# Patient Record
Sex: FEMALE | Race: BLACK OR AFRICAN AMERICAN | Employment: FULL TIME | URBAN - METROPOLITAN AREA
[De-identification: names, ages, dates, MRNs, and addresses within clinical notes are randomized per-mention and may not be internally consistent; named-entity substitution may affect disease eponyms.]

---

## 2019-02-06 ENCOUNTER — HOSPITAL ENCOUNTER (OUTPATIENT)
Dept: SLEEP CENTER | Age: 59
Discharge: HOME | End: 2019-02-06
Payer: COMMERCIAL

## 2019-02-06 VITALS
SYSTOLIC BLOOD PRESSURE: 130 MMHG | BODY MASS INDEX: 34.56 KG/M2 | DIASTOLIC BLOOD PRESSURE: 80 MMHG | HEART RATE: 68 BPM | WEIGHT: 228 LBS | HEIGHT: 68 IN | OXYGEN SATURATION: 95 %

## 2019-02-06 DIAGNOSIS — G47.9 RESTLESS SLEEPER: ICD-10-CM

## 2019-02-06 DIAGNOSIS — G47.19 EXCESSIVE DAYTIME SLEEPINESS: Primary | ICD-10-CM

## 2019-02-06 DIAGNOSIS — R40.0 EXCESSIVE SLEEPINESS WHILE DRIVING: ICD-10-CM

## 2019-02-06 DIAGNOSIS — E66.9 CLASS 1 OBESITY WITH BODY MASS INDEX (BMI) OF 34.0 TO 34.9 IN ADULT, UNSPECIFIED OBESITY TYPE, UNSPECIFIED WHETHER SERIOUS COMORBIDITY PRESENT: ICD-10-CM

## 2019-02-06 DIAGNOSIS — R06.83 SNORES: ICD-10-CM

## 2019-02-06 PROCEDURE — 95810 POLYSOM 6/> YRS 4/> PARAM: CPT

## 2019-02-06 ASSESSMENT — SLEEP AND FATIGUE QUESTIONNAIRES
HOW LIKELY ARE YOU TO NOD OFF OR FALL ASLEEP WHILE SITTING INACTIVE IN A PUBLIC PLACE: 2
HOW LIKELY ARE YOU TO NOD OFF OR FALL ASLEEP WHEN YOU ARE A PASSENGER IN A CAR FOR AN HOUR WITHOUT A BREAK: 3
HOW LIKELY ARE YOU TO NOD OFF OR FALL ASLEEP WHILE SITTING AND TALKING TO SOMEONE: 2
HOW LIKELY ARE YOU TO NOD OFF OR FALL ASLEEP WHILE SITTING AND READING: 3
ESS TOTAL SCORE: 19
HOW LIKELY ARE YOU TO NOD OFF OR FALL ASLEEP WHILE WATCHING TV: 3
HOW LIKELY ARE YOU TO NOD OFF OR FALL ASLEEP WHILE LYING DOWN TO REST IN THE AFTERNOON WHEN CIRCUMSTANCES PERMIT: 3
HOW LIKELY ARE YOU TO NOD OFF OR FALL ASLEEP WHILE SITTING QUIETLY AFTER LUNCH WITHOUT ALCOHOL: 2
HOW LIKELY ARE YOU TO NOD OFF OR FALL ASLEEP IN A CAR, WHILE STOPPED FOR A FEW MINUTES IN TRAFFIC: 1

## 2019-12-07 ENCOUNTER — HOSPITAL ENCOUNTER (EMERGENCY)
Age: 59
Discharge: HOME | End: 2019-12-07
Attending: EMERGENCY MEDICINE

## 2019-12-07 ENCOUNTER — APPOINTMENT (OUTPATIENT)
Dept: GENERAL RADIOLOGY | Age: 59
End: 2019-12-07

## 2019-12-07 VITALS
BODY MASS INDEX: 33.95 KG/M2 | OXYGEN SATURATION: 98 % | HEART RATE: 80 BPM | RESPIRATION RATE: 18 BRPM | SYSTOLIC BLOOD PRESSURE: 141 MMHG | TEMPERATURE: 98.2 F | WEIGHT: 224 LBS | HEIGHT: 68 IN | DIASTOLIC BLOOD PRESSURE: 93 MMHG

## 2019-12-07 DIAGNOSIS — M25.552 LEFT HIP PAIN: Primary | ICD-10-CM

## 2019-12-07 PROCEDURE — 72070 X-RAY EXAM THORAC SPINE 2VWS: CPT

## 2019-12-07 PROCEDURE — 72100 X-RAY EXAM L-S SPINE 2/3 VWS: CPT

## 2019-12-07 PROCEDURE — 6360000002 HC RX W HCPCS: Performed by: STUDENT IN AN ORGANIZED HEALTH CARE EDUCATION/TRAINING PROGRAM

## 2019-12-07 PROCEDURE — 96372 THER/PROPH/DIAG INJ SC/IM: CPT

## 2019-12-07 PROCEDURE — 99283 EMERGENCY DEPT VISIT LOW MDM: CPT

## 2019-12-07 PROCEDURE — 73502 X-RAY EXAM HIP UNI 2-3 VIEWS: CPT

## 2019-12-07 RX ORDER — NAPROXEN 250 MG/1
250 TABLET ORAL 2 TIMES DAILY WITH MEALS
Qty: 20 TABLET | Refills: 0 | Status: SHIPPED | OUTPATIENT
Start: 2019-12-07 | End: 2021-08-18

## 2019-12-07 RX ORDER — KETOROLAC TROMETHAMINE 30 MG/ML
30 INJECTION, SOLUTION INTRAMUSCULAR; INTRAVENOUS ONCE
Status: COMPLETED | OUTPATIENT
Start: 2019-12-07 | End: 2019-12-07

## 2019-12-07 RX ORDER — ORPHENADRINE CITRATE 100 MG/1
100 TABLET, EXTENDED RELEASE ORAL 2 TIMES DAILY
Qty: 20 TABLET | Refills: 0 | Status: SHIPPED | OUTPATIENT
Start: 2019-12-07 | End: 2019-12-17

## 2019-12-07 RX ORDER — ORPHENADRINE CITRATE 30 MG/ML
60 INJECTION INTRAMUSCULAR; INTRAVENOUS ONCE
Status: COMPLETED | OUTPATIENT
Start: 2019-12-07 | End: 2019-12-07

## 2019-12-07 RX ADMIN — ORPHENADRINE CITRATE 60 MG: 30 INJECTION INTRAMUSCULAR; INTRAVENOUS at 19:33

## 2019-12-07 RX ADMIN — KETOROLAC TROMETHAMINE 30 MG: 30 INJECTION, SOLUTION INTRAMUSCULAR; INTRAVENOUS at 19:33

## 2019-12-07 ASSESSMENT — ENCOUNTER SYMPTOMS
SORE THROAT: 0
VOMITING: 0
BACK PAIN: 0
SHORTNESS OF BREATH: 0
RHINORRHEA: 0
COUGH: 0
ABDOMINAL PAIN: 0
NAUSEA: 0
DIARRHEA: 0

## 2019-12-07 ASSESSMENT — PAIN SCALES - GENERAL: PAINLEVEL_OUTOF10: 9

## 2019-12-07 ASSESSMENT — PAIN DESCRIPTION - PROGRESSION: CLINICAL_PROGRESSION: GRADUALLY WORSENING

## 2019-12-07 ASSESSMENT — PAIN DESCRIPTION - DESCRIPTORS: DESCRIPTORS: NUMBNESS

## 2019-12-07 ASSESSMENT — PAIN DESCRIPTION - PAIN TYPE: TYPE: ACUTE PAIN

## 2019-12-07 ASSESSMENT — PAIN DESCRIPTION - ORIENTATION: ORIENTATION: LEFT

## 2019-12-07 ASSESSMENT — PAIN DESCRIPTION - LOCATION: LOCATION: HIP

## 2019-12-07 ASSESSMENT — PAIN DESCRIPTION - FREQUENCY: FREQUENCY: CONTINUOUS

## 2019-12-07 ASSESSMENT — PAIN DESCRIPTION - ONSET: ONSET: ON-GOING

## 2020-02-25 ENCOUNTER — HOSPITAL ENCOUNTER (OUTPATIENT)
Dept: GENERAL RADIOLOGY | Age: 60
Discharge: HOME | End: 2020-02-27
Payer: COMMERCIAL

## 2020-02-25 ENCOUNTER — HOSPITAL ENCOUNTER (OUTPATIENT)
Age: 60
Discharge: HOME | End: 2020-02-25
Payer: COMMERCIAL

## 2020-02-25 LAB
ALBUMIN SERPL-MCNC: 3.8 G/DL (ref 3.5–5.2)
ALP BLD-CCNC: 102 U/L (ref 35–104)
ALT SERPL-CCNC: 10 U/L (ref 0–32)
ANION GAP SERPL CALCULATED.3IONS-SCNC: 14 MMOL/L (ref 7–16)
AST SERPL-CCNC: 13 U/L (ref 0–31)
BASOPHILS ABSOLUTE: 0.02 E9/L (ref 0–0.2)
BASOPHILS RELATIVE PERCENT: 0.4 % (ref 0–2)
BILIRUB SERPL-MCNC: 0.4 MG/DL (ref 0–1.2)
BUN BLDV-MCNC: 12 MG/DL (ref 6–20)
CALCIUM SERPL-MCNC: 9.4 MG/DL (ref 8.6–10.2)
CHLORIDE BLD-SCNC: 105 MMOL/L (ref 98–107)
CHOLESTEROL, TOTAL: 119 MG/DL (ref 0–199)
CO2: 23 MMOL/L (ref 22–29)
CREAT SERPL-MCNC: 0.7 MG/DL (ref 0.5–1)
EOSINOPHILS ABSOLUTE: 0.35 E9/L (ref 0.05–0.5)
EOSINOPHILS RELATIVE PERCENT: 7 % (ref 0–6)
GFR AFRICAN AMERICAN: >60
GFR NON-AFRICAN AMERICAN: >60 ML/MIN/1.73
GLUCOSE BLD-MCNC: 92 MG/DL (ref 74–99)
HCT VFR BLD CALC: 36.2 % (ref 34–48)
HDLC SERPL-MCNC: 71 MG/DL
HEMOGLOBIN: 11.1 G/DL (ref 11.5–15.5)
IMMATURE GRANULOCYTES #: 0.01 E9/L
IMMATURE GRANULOCYTES %: 0.2 % (ref 0–5)
LDL CHOLESTEROL CALCULATED: 38 MG/DL (ref 0–99)
LYMPHOCYTES ABSOLUTE: 1.12 E9/L (ref 1.5–4)
LYMPHOCYTES RELATIVE PERCENT: 22.4 % (ref 20–42)
MCH RBC QN AUTO: 27.7 PG (ref 26–35)
MCHC RBC AUTO-ENTMCNC: 30.7 % (ref 32–34.5)
MCV RBC AUTO: 90.3 FL (ref 80–99.9)
MONOCYTES ABSOLUTE: 0.37 E9/L (ref 0.1–0.95)
MONOCYTES RELATIVE PERCENT: 7.4 % (ref 2–12)
NEUTROPHILS ABSOLUTE: 3.12 E9/L (ref 1.8–7.3)
NEUTROPHILS RELATIVE PERCENT: 62.6 % (ref 43–80)
PDW BLD-RTO: 14 FL (ref 11.5–15)
PLATELET # BLD: 191 E9/L (ref 130–450)
PMV BLD AUTO: 10.8 FL (ref 7–12)
POTASSIUM SERPL-SCNC: 3.8 MMOL/L (ref 3.5–5)
RBC # BLD: 4.01 E12/L (ref 3.5–5.5)
SODIUM BLD-SCNC: 142 MMOL/L (ref 132–146)
TOTAL PROTEIN: 7.9 G/DL (ref 6.4–8.3)
TRIGL SERPL-MCNC: 50 MG/DL (ref 0–149)
TSH SERPL DL<=0.05 MIU/L-ACNC: 1.09 UIU/ML (ref 0.27–4.2)
VITAMIN D 25-HYDROXY: 10 NG/ML (ref 30–100)
VLDLC SERPL CALC-MCNC: 10 MG/DL
WBC # BLD: 5 E9/L (ref 4.5–11.5)

## 2020-02-25 PROCEDURE — 84443 ASSAY THYROID STIM HORMONE: CPT

## 2020-02-25 PROCEDURE — 80061 LIPID PANEL: CPT

## 2020-02-25 PROCEDURE — 36415 COLL VENOUS BLD VENIPUNCTURE: CPT

## 2020-02-25 PROCEDURE — 77063 BREAST TOMOSYNTHESIS BI: CPT

## 2020-02-25 PROCEDURE — 85025 COMPLETE CBC W/AUTO DIFF WBC: CPT

## 2020-02-25 PROCEDURE — 80053 COMPREHEN METABOLIC PANEL: CPT

## 2020-02-25 PROCEDURE — 82306 VITAMIN D 25 HYDROXY: CPT

## 2020-09-15 ENCOUNTER — HOSPITAL ENCOUNTER (OUTPATIENT)
Age: 60
Discharge: HOME | End: 2020-09-15
Payer: COMMERCIAL

## 2020-09-15 LAB
EKG ATRIAL RATE: 61 BPM
EKG P AXIS: 3 DEGREES
EKG P-R INTERVAL: 214 MS
EKG Q-T INTERVAL: 456 MS
EKG QRS DURATION: 88 MS
EKG QTC CALCULATION (BAZETT): 459 MS
EKG R AXIS: -16 DEGREES
EKG T AXIS: 44 DEGREES
EKG VENTRICULAR RATE: 61 BPM

## 2020-09-15 PROCEDURE — 93010 ELECTROCARDIOGRAM REPORT: CPT | Performed by: INTERNAL MEDICINE

## 2020-09-15 PROCEDURE — 93005 ELECTROCARDIOGRAM TRACING: CPT | Performed by: FAMILY MEDICINE

## 2021-01-25 ENCOUNTER — HOSPITAL ENCOUNTER (EMERGENCY)
Age: 61
Discharge: HOME | End: 2021-01-25
Payer: COMMERCIAL

## 2021-01-25 VITALS
BODY MASS INDEX: 33.19 KG/M2 | OXYGEN SATURATION: 98 % | HEART RATE: 72 BPM | WEIGHT: 219 LBS | DIASTOLIC BLOOD PRESSURE: 68 MMHG | SYSTOLIC BLOOD PRESSURE: 114 MMHG | HEIGHT: 68 IN | TEMPERATURE: 98.1 F | RESPIRATION RATE: 16 BRPM

## 2021-01-25 DIAGNOSIS — U07.1 COVID-19: Primary | ICD-10-CM

## 2021-01-25 LAB — SARS-COV-2, NAAT: DETECTED

## 2021-01-25 PROCEDURE — U0002 COVID-19 LAB TEST NON-CDC: HCPCS

## 2021-01-25 PROCEDURE — 99283 EMERGENCY DEPT VISIT LOW MDM: CPT

## 2021-01-25 PROCEDURE — U0003 INFECTIOUS AGENT DETECTION BY NUCLEIC ACID (DNA OR RNA); SEVERE ACUTE RESPIRATORY SYNDROME CORONAVIRUS 2 (SARS-COV-2) (CORONAVIRUS DISEASE [COVID-19]), AMPLIFIED PROBE TECHNIQUE, MAKING USE OF HIGH THROUGHPUT TECHNOLOGIES AS DESCRIBED BY CMS-2020-01-R: HCPCS

## 2021-01-25 RX ORDER — FLUTICASONE PROPIONATE 50 MCG
1 SPRAY, SUSPENSION (ML) NASAL DAILY
Qty: 1 BOTTLE | Refills: 0 | Status: SHIPPED | OUTPATIENT
Start: 2021-01-25 | End: 2022-05-09

## 2021-01-25 RX ORDER — GUAIFENESIN/DEXTROMETHORPHAN 100-10MG/5
10 SYRUP ORAL 3 TIMES DAILY PRN
Qty: 120 ML | Refills: 0 | Status: SHIPPED | OUTPATIENT
Start: 2021-01-25 | End: 2021-02-04

## 2021-01-25 NOTE — ED NOTES
Lab called at [phone] to f/u on patient's rapid COVID swab d/t length of pending results. Virology states they will have supervisor check into it as it appears swab was placed in area for send out. Awaiting response/result.      Jihan Rashid LPN  33/26/67 [id number]

## 2021-01-25 NOTE — ED PROVIDER NOTES
[address]  Department of Emergency Medicine   ED  Encounter Note  Admit Date/RoomTime: 2021 10:24 AM  ED Room: [facility]    NAME: Annabel Jorgensen  : 1960  MRN: [id number]     Chief Complaint:  Concern For COVID-19 (son was visiting and states she wants checked for covid. aaox4. )    History of Present Illness       Annabel Jorgensen is a 62 y.o. old female who presents to the emergency department by private vehicle, for nasal congestion, slight cough which began several day(s) prior to arrival.  Since onset the symptoms have been stable and mild-moderate in severity. The symptoms are associated with son reportedly has covid-19. There has been NO fevers, chills, chest pain, shortness of breath, hemoptysis, leg edema. ROS   Pertinent positives and negatives are stated within HPI, all other systems reviewed and are negative. Past Medical History:  has a past medical history of Arthritis and Hypertension. Surgical History:  has a past surgical history that includes Dilation and curettage of uterus; Tonsillectomy; Breast enhancement surgery; and hernia repair. Social History:  reports that she has never smoked. She has never used smokeless tobacco. She reports current alcohol use. She reports that she does not use drugs. Family History: family history is not on file. Allergies: Penicillins and Penicillins    Physical Exam   Oxygen Saturation Interpretation: Normal.        ED Triage Vitals [21 0954]   BP Temp Temp Source Pulse Resp SpO2 Height Weight   (!) 153/70 98.2 °F (36.8 °C) Temporal 56 18 96 % 5' 8\" (1.727 m) 219 lb (99.3 kg)         · Constitutional:  Alert, development consistent with age. · Ears:  External Ears: Bilateral normal.               TM's & External Canals: normal appearance. · Nose:   There is no discharge, swelling or lesions noted. · Sinuses: no Bilateral maxillary sinus tenderness.                     no Bilateral frontal sinus tenderness. · Mouth:  normal tongue and buccal mucosa. · Throat: no erythema or exudates noted. Teeth and gums normal..  Airway Patent. · Neck:  Supple. There is no  preauricular, anterior cervical and posterior cervical node tenderness. · Respiratory:   Breath sounds: Bilateral normal.  Lung sounds: normal.   · CV:  Regular rate and rhythm, normal heart sounds, without pathological murmurs, ectopy, gallops, or rubs. · GI:  Abdomen Soft, nontender, good bowel sounds. No firm or pulsatile mass. · Integument:  Normal turgor. Warm, dry, without visible rash. · Neurological:  Oriented. Motor functions intact. Lab / Imaging Results   (All laboratory and radiology results have been personally reviewed by myself)  Labs:  Results for orders placed or performed during the hospital encounter of 01/25/21   Covid-19 Ambulatory   Result Value Ref Range    Source NP swab    COVID-19   Result Value Ref Range    SARS-CoV-2, NAAT DETECTED (A) Not Detected       Imaging: All Radiology results interpreted by Radiologist unless otherwise noted. No orders to display       ED Course / Medical Decision Making   Medications - No data to display     Medical Decision Making:    Based on low suspicion for pneumonia as per history/physical findings, imaging was not done.  Based on moderate suspicion for COVID-19, testing was obtained and confirms the above findings.  Upper respiratory infection is likely to  be viral in etiology. Antibiotics are not indicated at this time based on clinical presentation and physical findings. She is not hypoxic. Patient is well appearing, non toxic and appropriate for outpatient management. Plan of Care/Counseling:  Myself reviewed today's visit with the patient in addition to providing specific details for the plan of care and counseling regarding the diagnosis and prognosis. Questions are answered at this time and are agreeable with the plan. Assessment     1. COVID-19      Plan   Discharge home. Patient condition is good    New Medications     Discharge Medication List as of 1/25/2021  2:35 PM      START taking these medications    Details   guaiFENesin-dextromethorphan (ROBITUSSIN DM) 100-10 MG/5ML syrup Take 10 mLs by mouth 3 times daily as needed for Cough, Disp-120 mL, R-0Print      fluticasone (FLONASE) 50 MCG/ACT nasal spray 1 spray by Each Nostril route daily, Disp-1 Bottle, R-0Print           Electronically signed by MEHDI Jansen CNP   DD: 1/25/21  **This report was transcribed using voice recognition software. Every effort was made to ensure accuracy; however, inadvertent computerized transcription errors may be present.   END OF ED PROVIDER NOTE        MEHDI Salgado CNP  01/25/21 6156

## 2021-01-25 NOTE — ED NOTES
Lab returned my call at [phone] stating that the patient's COVID test was placed in the send out area because the tube had saline in it. Lab was informed that the original swab that was given was a dry swab. Lab states they didn't know what to tell me but the tube they had contains saline. Patient informed of the mix up and was given the option to wait on the results as a send out test or to be re-swabbed for a rapid test again. Patient chose to be re-swabbed at this time.      Bren Barbour LPN  67/82/62 1640

## 2021-01-26 ENCOUNTER — CARE COORDINATION (OUTPATIENT)
Dept: CARE COORDINATION | Age: 61
End: 2021-01-26

## 2021-01-26 LAB
SARS-COV-2: DETECTED
SOURCE: ABNORMAL

## 2021-01-26 NOTE — CARE COORDINATION
Date/Time:  1/26/2021 1:41 PM  Attempted to reach patient by telephone. Call within 2 business days of discharge: Yes Left HIPPA compliant message requesting a return call. Will attempt to reach patient again.

## 2021-01-27 ENCOUNTER — CARE COORDINATION (OUTPATIENT)
Dept: CARE COORDINATION | Age: 61
End: 2021-01-27

## 2021-03-15 ENCOUNTER — HOSPITAL ENCOUNTER (OUTPATIENT)
Dept: GENERAL RADIOLOGY | Age: 61
Discharge: HOME | End: 2021-03-17
Payer: COMMERCIAL

## 2021-03-15 DIAGNOSIS — Z12.31 ENCOUNTER FOR SCREENING MAMMOGRAM FOR MALIGNANT NEOPLASM OF BREAST: ICD-10-CM

## 2021-03-15 PROCEDURE — 77063 BREAST TOMOSYNTHESIS BI: CPT

## 2021-08-18 ENCOUNTER — HOSPITAL ENCOUNTER (EMERGENCY)
Age: 61
Discharge: HOME | End: 2021-08-18
Payer: COMMERCIAL

## 2021-08-18 VITALS
SYSTOLIC BLOOD PRESSURE: 183 MMHG | HEART RATE: 66 BPM | HEIGHT: 68 IN | BODY MASS INDEX: 33.19 KG/M2 | DIASTOLIC BLOOD PRESSURE: 120 MMHG | RESPIRATION RATE: 16 BRPM | OXYGEN SATURATION: 97 % | WEIGHT: 219 LBS | TEMPERATURE: 96.9 F

## 2021-08-18 DIAGNOSIS — K04.7 DENTAL ABSCESS: Primary | ICD-10-CM

## 2021-08-18 PROCEDURE — 99283 EMERGENCY DEPT VISIT LOW MDM: CPT

## 2021-08-18 PROCEDURE — 6370000000 HC RX 637 (ALT 250 FOR IP): Performed by: NURSE PRACTITIONER

## 2021-08-18 RX ORDER — NAPROXEN 500 MG/1
500 TABLET ORAL ONCE
Status: COMPLETED | OUTPATIENT
Start: 2021-08-18 | End: 2021-08-18

## 2021-08-18 RX ORDER — ACETAMINOPHEN 500 MG
1000 TABLET ORAL ONCE
Status: COMPLETED | OUTPATIENT
Start: 2021-08-18 | End: 2021-08-18

## 2021-08-18 RX ORDER — CHLORHEXIDINE GLUCONATE 0.12 MG/ML
15 RINSE ORAL 2 TIMES DAILY
Qty: 420 ML | Refills: 0 | Status: SHIPPED | OUTPATIENT
Start: 2021-08-18 | End: 2021-09-01

## 2021-08-18 RX ORDER — ACETAMINOPHEN 500 MG
1000 TABLET ORAL EVERY 6 HOURS PRN
Qty: 120 TABLET | Refills: 0 | Status: SHIPPED | OUTPATIENT
Start: 2021-08-18 | End: 2022-05-09

## 2021-08-18 RX ORDER — CLINDAMYCIN HYDROCHLORIDE 300 MG/1
300 CAPSULE ORAL 3 TIMES DAILY
Qty: 21 CAPSULE | Refills: 0 | Status: SHIPPED | OUTPATIENT
Start: 2021-08-18 | End: 2021-08-25

## 2021-08-18 RX ORDER — NAPROXEN 500 MG/1
500 TABLET ORAL 2 TIMES DAILY WITH MEALS
Qty: 20 TABLET | Refills: 0 | Status: SHIPPED | OUTPATIENT
Start: 2021-08-18 | End: 2022-05-09

## 2021-08-18 RX ORDER — CLINDAMYCIN HYDROCHLORIDE 150 MG/1
300 CAPSULE ORAL ONCE
Status: COMPLETED | OUTPATIENT
Start: 2021-08-18 | End: 2021-08-18

## 2021-08-18 RX ADMIN — ACETAMINOPHEN 1000 MG: 500 TABLET, FILM COATED ORAL at 14:59

## 2021-08-18 RX ADMIN — NAPROXEN 500 MG: 500 TABLET ORAL at 14:59

## 2021-08-18 RX ADMIN — CLINDAMYCIN HYDROCHLORIDE 300 MG: 150 CAPSULE ORAL at 14:59

## 2021-08-18 ASSESSMENT — PAIN SCALES - GENERAL
PAINLEVEL_OUTOF10: 5
PAINLEVEL_OUTOF10: 4

## 2021-08-18 ASSESSMENT — PAIN DESCRIPTION - PAIN TYPE: TYPE: ACUTE PAIN

## 2021-08-18 ASSESSMENT — PAIN DESCRIPTION - ORIENTATION: ORIENTATION: LEFT

## 2021-08-18 ASSESSMENT — PAIN DESCRIPTION - LOCATION: LOCATION: MOUTH

## 2021-08-18 NOTE — ED PROVIDER NOTES
[facility]  Department of Emergency Medicine   ED  Encounter Note  Admit Date/RoomTime: 2021  2:43 PM  ED Room: [place] [address]/    NAME: Margot Marie  : 1960  MRN: [id number]     Chief Complaint:  Facial Swelling (left side of face. Pt has bad tooth on top.)    History of Present Illness        Margot Marie is a 62 y.o. old female who presents to the emergency department by private vehicle, for non-traumatic left upper tooth pain, which occured a few day(s) prior to arrival.  Since onset the symptoms have been stable and mild-moderate in severity. Worsened by  hot liquids, cold liquids and chewing and improved by nothing. Associated Signs & Symptoms:  facial swelling left. She denies any fevers or chills or difficulty breathing or swallowing. ROS   Pertinent positives and negatives are stated within HPI, all other systems reviewed and are negative. Past Medical History:  has a past medical history of Arthritis and Hypertension. Surgical History:  has a past surgical history that includes Dilation and curettage of uterus; Tonsillectomy; Breast enhancement surgery; and hernia repair. Social History:  reports that she has never smoked. She has never used smokeless tobacco. She reports current alcohol use. She reports that she does not use drugs. Family History: family history is not on file. Allergies: Penicillins and Penicillins    Physical Exam   Oxygen Saturation Interpretation: Normal.        ED Triage Vitals   BP Temp Temp src Pulse Resp SpO2 Height Weight   21 1440 21 1432 -- 21 1440 21 1440 21 1440 21 1440 21 1440   (!) 183/120 96.9 °F (36.1 °C)  66 16 97 % 5' 8\" (1.727 m) 219 lb (99.3 kg)         · Constitutional:  Alert, development consistent with age. · HEENT:  NC/NT. Airway patent. · Neck:  Supple. Normal ROM.   · Lips:  upper and lower normal.  · Mouth:  normal tongue and buccal mucosa. · Dental: Tenderness to palpation to left upper posterior molar with surrounding induration and fluctuance tooth broken with caries. Trismus: No.         Drooling: No.           Airway stridor: No.  · Facial skin: left tenderness and swelling. · Respiratory:  Clear to auscultation and breath sounds equal.    · CV: Regular rate and rhythm, normal heart sounds, without pathological murmurs, ectopy, gallops, or rubs. · Skin:  No rashes, erythema or lesions present, unless noted elsewhere. .  · Lymphatics: No lymphangitis or adenopathy noted. · Neurological:  Oriented. Motor functions intact. Lab / Imaging Results   (All laboratory and radiology results have been personally reviewed by myself)  Labs:  No results found for this visit on 08/18/21. Imaging: All Radiology results interpreted by Radiologist unless otherwise noted. No orders to display     ED Course / Medical Decision Making     Medications   clindamycin (CLEOCIN) capsule 300 mg (300 mg Oral Given 8/18/21 1459)   naproxen (NAPROSYN) tablet 500 mg (500 mg Oral Given 8/18/21 1459)   acetaminophen (TYLENOL) tablet 1,000 mg (1,000 mg Oral Given 8/18/21 1459)        Consult(s):   Dental Resident was not consulted to see patient regarding complaint. Procedure(s):   None    MDM: Dental pain with abscess noted on exam no trismus drooling fever chills. Plan will be start antibiotics and patient to go to dental clinic tomorrow morning as a walk-in for evaluation and likely I&D. She was educated on signs and symptoms require emergent reevaluation. Plan of Care/Counseling:  MEHDI Carrero CNP reviewed today's visit with the patient in addition to providing specific details for the plan of care and counseling regarding the diagnosis and prognosis. Questions are answered at this time and are agreeable with the plan. Assessment      1. Dental abscess      Plan   Discharged home.   Patient condition is good    New Medications     Discharge Medication List as of 8/18/2021  2:55 PM      START taking these medications    Details   clindamycin (CLEOCIN) 300 MG capsule Take 1 capsule by mouth 3 times daily for 7 days, Disp-21 capsule, R-0Print      acetaminophen (TYLENOL) 500 MG tablet Take 2 tablets by mouth every 6 hours as needed for Pain Maximum dose- 8 tablets/24 hours. , Disp-120 tablet, R-0Print      chlorhexidine (PERIDEX) 0.12 % solution Take 15 mLs by mouth 2 times daily for 14 days Swish and spit, Disp-420 mL, R-0Print           Electronically signed by MEHDI Parmar CNP   DD: 8/18/21  **This report was transcribed using voice recognition software. Every effort was made to ensure accuracy; however, inadvertent computerized transcription errors may be present.   END OF ED PROVIDER NOTE      MEHDI Dobbs CNP  08/18/21 7644

## 2022-01-11 ENCOUNTER — HOSPITAL ENCOUNTER (EMERGENCY)
Age: 62
Discharge: HOME | End: 2022-01-11
Payer: COMMERCIAL

## 2022-01-11 VITALS
RESPIRATION RATE: 18 BRPM | TEMPERATURE: 98.1 F | HEART RATE: 71 BPM | DIASTOLIC BLOOD PRESSURE: 92 MMHG | OXYGEN SATURATION: 94 % | SYSTOLIC BLOOD PRESSURE: 148 MMHG

## 2022-01-11 DIAGNOSIS — K08.89 PAIN, DENTAL: Primary | ICD-10-CM

## 2022-01-11 DIAGNOSIS — K02.9 DENTAL CARIES: ICD-10-CM

## 2022-01-11 PROCEDURE — 99282 EMERGENCY DEPT VISIT SF MDM: CPT

## 2022-01-11 RX ORDER — IBUPROFEN 800 MG/1
800 TABLET ORAL EVERY 8 HOURS PRN
Qty: 21 TABLET | Refills: 0 | Status: SHIPPED | OUTPATIENT
Start: 2022-01-11 | End: 2022-05-09

## 2022-01-11 RX ORDER — CHLORHEXIDINE GLUCONATE 0.12 MG/ML
15 RINSE ORAL 2 TIMES DAILY
Qty: 420 ML | Refills: 0 | Status: SHIPPED | OUTPATIENT
Start: 2022-01-11 | End: 2022-01-25

## 2022-01-11 RX ORDER — CLINDAMYCIN HYDROCHLORIDE 300 MG/1
300 CAPSULE ORAL 3 TIMES DAILY
Qty: 30 CAPSULE | Refills: 0 | Status: SHIPPED | OUTPATIENT
Start: 2022-01-11 | End: 2022-01-21

## 2022-01-11 NOTE — Clinical Note
Ceci Loyola was seen and treated in our emergency department on 1/11/2022. She may return to work on 01/12/2022. If you have any questions or concerns, please don't hesitate to call.       ARIANNE Luis

## 2022-01-11 NOTE — ED PROVIDER NOTES
[facility]dependent [facility]    HPI: Mirela Monae 62 y.o. female with a past medical history of   Past Medical History:   Diagnosis Date    Arthritis     Hypertension     presents with a complaint of dental pain for the past several days patient no known injury. Patient reports that she has a previously scheduled appointment with her dentist in February to have several extractions on her topper of teeth but she states that she presented there today and they were unable to see her. Patient's dentist recommended that she be evaluated in the ED setting for any type of infection and get on antibiotic to prevent abscess. Patient states that she has had abscess in the past which she received antibiotic and Peridex rinse. Patient states that she currently was using Peridex rinse at home which she had from a prior visit for the past several days. Patient states she has utilized over-the-counter analgesic medication to alleviate her pain. Patient denies any red flag symptoms of fever, chills difficulty swallowing or opening her mouth, nausea, vomiting, diarrhea, chest pain or shortness of breath. Patient is currently not on any blood thinners or antibiotics. The patient states this pain has been gradual in onset, persistent, moderate in severity and worse today which is what prompted the visit. Pain has not been relieved with any OTC medications. Patient denies any unilateral facial swelling. Patient is able to handle their own secretions and drink fluids without difficulty. Patient denies any fever. The patient also denies any history of dental trauma. Denies difficulty breathing or swallowing.  The location of the pain and appears to be isolated over tooth 13.        Review of Systems:   Pertinent positives and negatives are stated within HPI, all other systems reviewed and are negative.         --------------------------------------------- PAST HISTORY ---------------------------------------------  Past Medical History: has a past medical history of Arthritis and Hypertension. Past Surgical History:  has a past surgical history that includes Dilation and curettage of uterus; Tonsillectomy; Breast enhancement surgery; and hernia repair. Social History:  reports that she has never smoked. She has never used smokeless tobacco. She reports current alcohol use. She reports that she does not use drugs. Family History: family history is not on file. The patients home medications have been reviewed. Allergies: Penicillins and Penicillins    ------------------------- NURSING NOTES AND VITALS REVIEWED ---------------------------   The nursing notes within the ED encounter and vital signs as below have been reviewed by myself. BP (!) 148/92   Pulse 71   Temp 98.1 °F (36.7 °C) (Oral)   Resp 18   SpO2 94%   Oxygen Saturation Interpretation: Normal    The patients available past medical records and past encounters were reviewed. Physical exam:  Constitutional: The patient is comfortable, alert and oriented x3, well appearing, non toxic in NAD. Head: Atraumatic and normocephalic. Eyes: No discharge from the eyes the sclerae are normal.  ENT: The oropharynx is normal. No pharyngeal erythema, uvular edema, tonsillar exudates, asymmetry or trismus. Mouth is normal to inspection  With the exception of a pain on percussion of the tooth noted above. There is no evidence of facial asymmetry or abscess formation. Floor of the mouth is soft. No tenderness in the submental or submandibular space. No tongue elevation. Neck: The neck demonstrates normal range of motion. No meningeals signs are present. No stridor. Respiratory/chest: The chest is nontender. Breath sounds are normal. no respiratory distress is noted  Cardiovascular: Heart shows a regular rate and rhythm no murmurs no rubs no gallops.   Skin: The skin exam shows no evidence of rashes  Neuro: GCS is 15  Lymphatic: No cervical lymphadenopathy       -------------------------------------------------- RESULTS -------------------------------------------------  I have personally reviewed all laboratory and imaging results for this patient. Results are listed below. LABS:  No results found for this visit on 01/11/22. RADIOLOGY:  Interpreted by Radiologist.  No orders to display               Medical Decision Making: Exam and history c/w  dental pain without evidence of gross infection. At this time we will  the patient on following up in with her established dentist for appointment in february and provide pain relief.       Impression:   1) Dental Pain  2) Dental Caries    Disposition: Discharge  Condition: Stable               [address]  01/11/22 2100

## 2022-04-21 ENCOUNTER — HOSPITAL ENCOUNTER (OUTPATIENT)
Age: 62
Discharge: HOME | End: 2022-04-23
Payer: COMMERCIAL

## 2022-04-21 ENCOUNTER — HOSPITAL ENCOUNTER (OUTPATIENT)
Dept: GENERAL RADIOLOGY | Age: 62
Discharge: HOME | End: 2022-04-23
Payer: COMMERCIAL

## 2022-04-21 DIAGNOSIS — R06.02 SHORTNESS OF BREATH: ICD-10-CM

## 2022-04-21 PROCEDURE — 71046 X-RAY EXAM CHEST 2 VIEWS: CPT

## 2022-05-09 ENCOUNTER — HOSPITAL ENCOUNTER (OUTPATIENT)
Age: 62
Setting detail: OBSERVATION
Discharge: HOME | End: 2022-05-13
Attending: EMERGENCY MEDICINE | Admitting: INTERNAL MEDICINE
Payer: COMMERCIAL

## 2022-05-09 ENCOUNTER — APPOINTMENT (OUTPATIENT)
Dept: GENERAL RADIOLOGY | Age: 62
End: 2022-05-09
Payer: COMMERCIAL

## 2022-05-09 ENCOUNTER — APPOINTMENT (OUTPATIENT)
Dept: CT IMAGING | Age: 62
End: 2022-05-09
Payer: COMMERCIAL

## 2022-05-09 DIAGNOSIS — R55 NEAR SYNCOPE: ICD-10-CM

## 2022-05-09 DIAGNOSIS — R06.02 SHORTNESS OF BREATH: ICD-10-CM

## 2022-05-09 DIAGNOSIS — I50.9 CONGESTIVE HEART FAILURE, UNSPECIFIED HF CHRONICITY, UNSPECIFIED HEART FAILURE TYPE (HCC): ICD-10-CM

## 2022-05-09 DIAGNOSIS — R10.9 ABDOMINAL PAIN, UNSPECIFIED ABDOMINAL LOCATION: ICD-10-CM

## 2022-05-09 DIAGNOSIS — I50.9 ACUTE CONGESTIVE HEART FAILURE, UNSPECIFIED HEART FAILURE TYPE (HCC): Primary | ICD-10-CM

## 2022-05-09 PROBLEM — R06.00 ACUTE DYSPNEA: Status: ACTIVE | Noted: 2022-05-09

## 2022-05-09 PROBLEM — E87.20 LACTIC ACIDOSIS: Status: ACTIVE | Noted: 2022-05-09

## 2022-05-09 PROBLEM — N30.00 ACUTE CYSTITIS WITHOUT HEMATURIA: Status: ACTIVE | Noted: 2022-05-09

## 2022-05-09 LAB
ACETAMINOPHEN LEVEL: <5 MCG/ML (ref 10–30)
ALBUMIN SERPL-MCNC: 4.1 G/DL (ref 3.5–5.2)
ALP BLD-CCNC: 113 U/L (ref 35–104)
ALT SERPL-CCNC: 8 U/L (ref 0–32)
ANION GAP SERPL CALCULATED.3IONS-SCNC: 15 MMOL/L (ref 7–16)
AST SERPL-CCNC: 15 U/L (ref 0–31)
BACTERIA: ABNORMAL /HPF
BASOPHILS ABSOLUTE: 0.01 E9/L (ref 0–0.2)
BASOPHILS RELATIVE PERCENT: 0.4 % (ref 0–2)
BILIRUB SERPL-MCNC: 0.4 MG/DL (ref 0–1.2)
BILIRUBIN URINE: NEGATIVE
BLOOD, URINE: ABNORMAL
BUN BLDV-MCNC: 13 MG/DL (ref 6–23)
CALCIUM SERPL-MCNC: 8.8 MG/DL (ref 8.6–10.2)
CHLORIDE BLD-SCNC: 106 MMOL/L (ref 98–107)
CHOLESTEROL, TOTAL: 126 MG/DL (ref 0–199)
CLARITY: CLEAR
CO2: 22 MMOL/L (ref 22–29)
COLOR: YELLOW
CREAT SERPL-MCNC: 0.9 MG/DL (ref 0.5–1)
D DIMER: 641 NG/ML DDU
EKG ATRIAL RATE: 68 BPM
EKG P AXIS: 37 DEGREES
EKG P-R INTERVAL: 220 MS
EKG Q-T INTERVAL: 446 MS
EKG QRS DURATION: 102 MS
EKG QTC CALCULATION (BAZETT): 474 MS
EKG R AXIS: -30 DEGREES
EKG T AXIS: 126 DEGREES
EKG VENTRICULAR RATE: 68 BPM
EOSINOPHILS ABSOLUTE: 0.14 E9/L (ref 0.05–0.5)
EOSINOPHILS RELATIVE PERCENT: 5.3 % (ref 0–6)
EPITHELIAL CELLS, UA: ABNORMAL /HPF
ETHANOL: 78 MG/DL (ref 0–0.08)
GFR AFRICAN AMERICAN: >60
GFR NON-AFRICAN AMERICAN: >60 ML/MIN/1.73
GLUCOSE BLD-MCNC: 177 MG/DL (ref 74–99)
GLUCOSE URINE: NEGATIVE MG/DL
HBA1C MFR BLD: 5.6 % (ref 4–5.6)
HCT VFR BLD CALC: 37.8 % (ref 34–48)
HDLC SERPL-MCNC: 68 MG/DL
HEMOGLOBIN: 12.4 G/DL (ref 11.5–15.5)
IMMATURE GRANULOCYTES #: 0.01 E9/L
IMMATURE GRANULOCYTES %: 0.4 % (ref 0–5)
KETONES, URINE: NEGATIVE MG/DL
LACTIC ACID: 2 MMOL/L (ref 0.5–2.2)
LACTIC ACID: 2.6 MMOL/L (ref 0.5–2.2)
LDL CHOLESTEROL CALCULATED: 43 MG/DL (ref 0–99)
LEUKOCYTE ESTERASE, URINE: ABNORMAL
LIPASE: 30 U/L (ref 13–60)
LYMPHOCYTES ABSOLUTE: 0.82 E9/L (ref 1.5–4)
LYMPHOCYTES RELATIVE PERCENT: 31.3 % (ref 20–42)
MCH RBC QN AUTO: 29.2 PG (ref 26–35)
MCHC RBC AUTO-ENTMCNC: 32.8 % (ref 32–34.5)
MCV RBC AUTO: 88.9 FL (ref 80–99.9)
MONOCYTES ABSOLUTE: 0.27 E9/L (ref 0.1–0.95)
MONOCYTES RELATIVE PERCENT: 10.3 % (ref 2–12)
NEUTROPHILS ABSOLUTE: 1.37 E9/L (ref 1.8–7.3)
NEUTROPHILS RELATIVE PERCENT: 52.3 % (ref 43–80)
NITRITE, URINE: NEGATIVE
PDW BLD-RTO: 12.7 FL (ref 11.5–15)
PH UA: 5.5 (ref 5–9)
PLATELET # BLD: 163 E9/L (ref 130–450)
PMV BLD AUTO: 11.8 FL (ref 7–12)
POTASSIUM SERPL-SCNC: 3.5 MMOL/L (ref 3.5–5)
PRO-BNP: 1421 PG/ML (ref 0–125)
PROTEIN UA: NEGATIVE MG/DL
RBC # BLD: 4.25 E12/L (ref 3.5–5.5)
RBC UA: ABNORMAL /HPF (ref 0–2)
SALICYLATE, SERUM: <0.3 MG/DL (ref 0–30)
SARS-COV-2, NAAT: NOT DETECTED
SODIUM BLD-SCNC: 143 MMOL/L (ref 132–146)
SPECIFIC GRAVITY UA: >=1.03 (ref 1–1.03)
TOTAL PROTEIN: 7.1 G/DL (ref 6.4–8.3)
TRICYCLIC ANTIDEPRESSANTS SCREEN SERUM: NEGATIVE NG/ML
TRIGL SERPL-MCNC: 75 MG/DL (ref 0–149)
TROPONIN, HIGH SENSITIVITY: 10 NG/L (ref 0–9)
TROPONIN, HIGH SENSITIVITY: 15 NG/L (ref 0–9)
TROPONIN, HIGH SENSITIVITY: 9 NG/L (ref 0–9)
TROPONIN, HIGH SENSITIVITY: 9 NG/L (ref 0–9)
TSH SERPL DL<=0.05 MIU/L-ACNC: 0.53 UIU/ML (ref 0.27–4.2)
UROBILINOGEN, URINE: 0.2 E.U./DL
VLDLC SERPL CALC-MCNC: 15 MG/DL
WBC # BLD: 2.6 E9/L (ref 4.5–11.5)
WBC UA: ABNORMAL /HPF (ref 0–5)

## 2022-05-09 PROCEDURE — 2580000003 HC RX 258: Performed by: NURSE PRACTITIONER

## 2022-05-09 PROCEDURE — 6370000000 HC RX 637 (ALT 250 FOR IP): Performed by: NURSE PRACTITIONER

## 2022-05-09 PROCEDURE — 80143 DRUG ASSAY ACETAMINOPHEN: CPT

## 2022-05-09 PROCEDURE — 84443 ASSAY THYROID STIM HORMONE: CPT

## 2022-05-09 PROCEDURE — 94664 DEMO&/EVAL PT USE INHALER: CPT

## 2022-05-09 PROCEDURE — 93005 ELECTROCARDIOGRAM TRACING: CPT | Performed by: EMERGENCY MEDICINE

## 2022-05-09 PROCEDURE — 80307 DRUG TEST PRSMV CHEM ANLYZR: CPT

## 2022-05-09 PROCEDURE — 2500000003 HC RX 250 WO HCPCS: Performed by: NURSE PRACTITIONER

## 2022-05-09 PROCEDURE — 87635 SARS-COV-2 COVID-19 AMP PRB: CPT

## 2022-05-09 PROCEDURE — 6360000002 HC RX W HCPCS: Performed by: INTERNAL MEDICINE

## 2022-05-09 PROCEDURE — 83036 HEMOGLOBIN GLYCOSYLATED A1C: CPT

## 2022-05-09 PROCEDURE — 6370000000 HC RX 637 (ALT 250 FOR IP): Performed by: INTERNAL MEDICINE

## 2022-05-09 PROCEDURE — G0378 HOSPITAL OBSERVATION PER HR: HCPCS

## 2022-05-09 PROCEDURE — 6360000004 HC RX CONTRAST MEDICATION: Performed by: RADIOLOGY

## 2022-05-09 PROCEDURE — 2060000000 HC ICU INTERMEDIATE R&B

## 2022-05-09 PROCEDURE — 83605 ASSAY OF LACTIC ACID: CPT

## 2022-05-09 PROCEDURE — 83880 ASSAY OF NATRIURETIC PEPTIDE: CPT

## 2022-05-09 PROCEDURE — 80179 DRUG ASSAY SALICYLATE: CPT

## 2022-05-09 PROCEDURE — 85025 COMPLETE CBC W/AUTO DIFF WBC: CPT

## 2022-05-09 PROCEDURE — 96372 THER/PROPH/DIAG INJ SC/IM: CPT

## 2022-05-09 PROCEDURE — 74177 CT ABD & PELVIS W/CONTRAST: CPT

## 2022-05-09 PROCEDURE — 80061 LIPID PANEL: CPT

## 2022-05-09 PROCEDURE — 71045 X-RAY EXAM CHEST 1 VIEW: CPT

## 2022-05-09 PROCEDURE — 80053 COMPREHEN METABOLIC PANEL: CPT

## 2022-05-09 PROCEDURE — 84484 ASSAY OF TROPONIN QUANT: CPT

## 2022-05-09 PROCEDURE — 82077 ASSAY SPEC XCP UR&BREATH IA: CPT

## 2022-05-09 PROCEDURE — 81001 URINALYSIS AUTO W/SCOPE: CPT

## 2022-05-09 PROCEDURE — 83690 ASSAY OF LIPASE: CPT

## 2022-05-09 PROCEDURE — 93010 ELECTROCARDIOGRAM REPORT: CPT | Performed by: INTERNAL MEDICINE

## 2022-05-09 PROCEDURE — 96374 THER/PROPH/DIAG INJ IV PUSH: CPT

## 2022-05-09 PROCEDURE — 94640 AIRWAY INHALATION TREATMENT: CPT

## 2022-05-09 PROCEDURE — 71275 CT ANGIOGRAPHY CHEST: CPT

## 2022-05-09 PROCEDURE — 85378 FIBRIN DEGRADE SEMIQUANT: CPT

## 2022-05-09 PROCEDURE — 36415 COLL VENOUS BLD VENIPUNCTURE: CPT

## 2022-05-09 PROCEDURE — 99285 EMERGENCY DEPT VISIT HI MDM: CPT

## 2022-05-09 RX ORDER — ONDANSETRON 4 MG/1
4 TABLET, ORALLY DISINTEGRATING ORAL EVERY 8 HOURS PRN
Status: DISCONTINUED | OUTPATIENT
Start: 2022-05-09 | End: 2022-05-13 | Stop reason: HOSPADM

## 2022-05-09 RX ORDER — FUROSEMIDE 20 MG/1
40 TABLET ORAL DAILY
Status: DISCONTINUED | OUTPATIENT
Start: 2022-05-09 | End: 2022-05-09

## 2022-05-09 RX ORDER — ACETAMINOPHEN 650 MG/1
650 SUPPOSITORY RECTAL EVERY 6 HOURS PRN
Status: DISCONTINUED | OUTPATIENT
Start: 2022-05-09 | End: 2022-05-13 | Stop reason: HOSPADM

## 2022-05-09 RX ORDER — DOCUSATE SODIUM 100 MG/1
100 CAPSULE, LIQUID FILLED ORAL NIGHTLY
Status: DISCONTINUED | OUTPATIENT
Start: 2022-05-09 | End: 2022-05-13 | Stop reason: HOSPADM

## 2022-05-09 RX ORDER — SODIUM CHLORIDE 0.9 % (FLUSH) 0.9 %
5-40 SYRINGE (ML) INJECTION PRN
Status: DISCONTINUED | OUTPATIENT
Start: 2022-05-09 | End: 2022-05-13 | Stop reason: HOSPADM

## 2022-05-09 RX ORDER — ALBUTEROL SULFATE 0.63 MG/3ML
0.63 SOLUTION RESPIRATORY (INHALATION) EVERY 6 HOURS PRN
Status: DISCONTINUED | OUTPATIENT
Start: 2022-05-09 | End: 2022-05-13 | Stop reason: HOSPADM

## 2022-05-09 RX ORDER — LOSARTAN POTASSIUM 25 MG/1
25 TABLET ORAL DAILY
Status: DISCONTINUED | OUTPATIENT
Start: 2022-05-09 | End: 2022-05-09

## 2022-05-09 RX ORDER — METOPROLOL SUCCINATE 100 MG/1
100 TABLET, EXTENDED RELEASE ORAL DAILY
Status: ON HOLD | COMMUNITY
End: 2022-05-12 | Stop reason: HOSPADM

## 2022-05-09 RX ORDER — SODIUM CHLORIDE 9 MG/ML
INJECTION, SOLUTION INTRAVENOUS PRN
Status: DISCONTINUED | OUTPATIENT
Start: 2022-05-09 | End: 2022-05-13 | Stop reason: HOSPADM

## 2022-05-09 RX ORDER — SODIUM CHLORIDE 0.9 % (FLUSH) 0.9 %
5-40 SYRINGE (ML) INJECTION EVERY 12 HOURS SCHEDULED
Status: DISCONTINUED | OUTPATIENT
Start: 2022-05-09 | End: 2022-05-13 | Stop reason: HOSPADM

## 2022-05-09 RX ORDER — ACETAMINOPHEN 325 MG/1
650 TABLET ORAL EVERY 6 HOURS PRN
Status: DISCONTINUED | OUTPATIENT
Start: 2022-05-09 | End: 2022-05-13 | Stop reason: HOSPADM

## 2022-05-09 RX ORDER — ACETAMINOPHEN 325 MG/1
650 TABLET ORAL EVERY 4 HOURS PRN
Status: DISCONTINUED | OUTPATIENT
Start: 2022-05-09 | End: 2022-05-09 | Stop reason: SDUPTHER

## 2022-05-09 RX ORDER — POTASSIUM CHLORIDE 20 MEQ/1
40 TABLET, EXTENDED RELEASE ORAL PRN
Status: DISCONTINUED | OUTPATIENT
Start: 2022-05-09 | End: 2022-05-13 | Stop reason: HOSPADM

## 2022-05-09 RX ORDER — BUMETANIDE 0.25 MG/ML
0.5 INJECTION, SOLUTION INTRAMUSCULAR; INTRAVENOUS DAILY
Status: DISCONTINUED | OUTPATIENT
Start: 2022-05-09 | End: 2022-05-11

## 2022-05-09 RX ORDER — ONDANSETRON 2 MG/ML
4 INJECTION INTRAMUSCULAR; INTRAVENOUS EVERY 6 HOURS PRN
Status: DISCONTINUED | OUTPATIENT
Start: 2022-05-09 | End: 2022-05-13 | Stop reason: HOSPADM

## 2022-05-09 RX ORDER — MELOXICAM 15 MG/1
15 TABLET ORAL DAILY
COMMUNITY

## 2022-05-09 RX ORDER — FLUTICASONE PROPIONATE 50 MCG
1 SPRAY, SUSPENSION (ML) NASAL DAILY PRN
COMMUNITY

## 2022-05-09 RX ORDER — ENOXAPARIN SODIUM 100 MG/ML
40 INJECTION SUBCUTANEOUS DAILY
Status: DISCONTINUED | OUTPATIENT
Start: 2022-05-09 | End: 2022-05-09 | Stop reason: SDUPTHER

## 2022-05-09 RX ORDER — POTASSIUM CHLORIDE 7.45 MG/ML
10 INJECTION INTRAVENOUS PRN
Status: DISCONTINUED | OUTPATIENT
Start: 2022-05-09 | End: 2022-05-13 | Stop reason: HOSPADM

## 2022-05-09 RX ORDER — PANTOPRAZOLE SODIUM 40 MG/1
40 TABLET, DELAYED RELEASE ORAL
Status: DISCONTINUED | OUTPATIENT
Start: 2022-05-09 | End: 2022-05-13 | Stop reason: HOSPADM

## 2022-05-09 RX ORDER — CIPROFLOXACIN 500 MG/1
500 TABLET, FILM COATED ORAL DAILY
Status: COMPLETED | OUTPATIENT
Start: 2022-05-09 | End: 2022-05-11

## 2022-05-09 RX ORDER — IPRATROPIUM BROMIDE AND ALBUTEROL SULFATE 2.5; .5 MG/3ML; MG/3ML
1 SOLUTION RESPIRATORY (INHALATION)
Status: DISCONTINUED | OUTPATIENT
Start: 2022-05-09 | End: 2022-05-09

## 2022-05-09 RX ORDER — SENNA PLUS 8.6 MG/1
1 TABLET ORAL DAILY PRN
Status: DISCONTINUED | OUTPATIENT
Start: 2022-05-09 | End: 2022-05-13 | Stop reason: HOSPADM

## 2022-05-09 RX ORDER — ALBUTEROL SULFATE 2.5 MG/3ML
2.5 SOLUTION RESPIRATORY (INHALATION) 4 TIMES DAILY
Status: DISCONTINUED | OUTPATIENT
Start: 2022-05-09 | End: 2022-05-13 | Stop reason: HOSPADM

## 2022-05-09 RX ORDER — METOPROLOL SUCCINATE 100 MG/1
100 TABLET, EXTENDED RELEASE ORAL DAILY
Status: DISCONTINUED | OUTPATIENT
Start: 2022-05-09 | End: 2022-05-10

## 2022-05-09 RX ORDER — ENOXAPARIN SODIUM 100 MG/ML
40 INJECTION SUBCUTANEOUS DAILY
Status: DISCONTINUED | OUTPATIENT
Start: 2022-05-09 | End: 2022-05-13 | Stop reason: HOSPADM

## 2022-05-09 RX ORDER — ONDANSETRON 2 MG/ML
4 INJECTION INTRAMUSCULAR; INTRAVENOUS EVERY 6 HOURS PRN
Status: DISCONTINUED | OUTPATIENT
Start: 2022-05-09 | End: 2022-05-09 | Stop reason: SDUPTHER

## 2022-05-09 RX ADMIN — SODIUM CHLORIDE, PRESERVATIVE FREE 10 ML: 5 INJECTION INTRAVENOUS at 20:41

## 2022-05-09 RX ADMIN — BUMETANIDE 0.5 MG: 0.25 INJECTION INTRAMUSCULAR; INTRAVENOUS at 13:23

## 2022-05-09 RX ADMIN — ENOXAPARIN SODIUM 40 MG: 100 INJECTION SUBCUTANEOUS at 09:08

## 2022-05-09 RX ADMIN — ALBUTEROL SULFATE 2.5 MG: 2.5 SOLUTION RESPIRATORY (INHALATION) at 20:06

## 2022-05-09 RX ADMIN — DOCUSATE SODIUM 100 MG: 100 CAPSULE, LIQUID FILLED ORAL at 20:35

## 2022-05-09 RX ADMIN — PANTOPRAZOLE SODIUM 40 MG: 40 TABLET, DELAYED RELEASE ORAL at 09:04

## 2022-05-09 RX ADMIN — FUROSEMIDE 40 MG: 20 TABLET ORAL at 09:08

## 2022-05-09 RX ADMIN — ACETAMINOPHEN 650 MG: 325 TABLET ORAL at 20:35

## 2022-05-09 RX ADMIN — LOSARTAN POTASSIUM 25 MG: 25 TABLET, FILM COATED ORAL at 09:08

## 2022-05-09 RX ADMIN — METOPROLOL SUCCINATE 100 MG: 100 TABLET, FILM COATED, EXTENDED RELEASE ORAL at 20:35

## 2022-05-09 RX ADMIN — IOPAMIDOL 75 ML: 755 INJECTION, SOLUTION INTRAVENOUS at 04:33

## 2022-05-09 RX ADMIN — IPRATROPIUM BROMIDE AND ALBUTEROL SULFATE 1 AMPULE: 2.5; .5 SOLUTION RESPIRATORY (INHALATION) at 11:55

## 2022-05-09 RX ADMIN — CIPROFLOXACIN 500 MG: 500 TABLET, FILM COATED ORAL at 13:23

## 2022-05-09 ASSESSMENT — PAIN SCALES - GENERAL
PAINLEVEL_OUTOF10: 8
PAINLEVEL_OUTOF10: 5
PAINLEVEL_OUTOF10: 8

## 2022-05-09 ASSESSMENT — PAIN DESCRIPTION - LOCATION
LOCATION: ABDOMEN

## 2022-05-09 ASSESSMENT — ENCOUNTER SYMPTOMS
SHORTNESS OF BREATH: 1
COUGH: 0
CHOKING: 0
ABDOMINAL PAIN: 1
CHEST TIGHTNESS: 0
WHEEZING: 0
SINUS PAIN: 0
NAUSEA: 0
SORE THROAT: 0
VOMITING: 0
DIARRHEA: 0
EYE PAIN: 0

## 2022-05-09 ASSESSMENT — PAIN DESCRIPTION - PAIN TYPE: TYPE: ACUTE PAIN

## 2022-05-09 ASSESSMENT — PAIN DESCRIPTION - ORIENTATION
ORIENTATION: LOWER
ORIENTATION: LOWER;RIGHT;LEFT
ORIENTATION: RIGHT;LEFT;LOWER

## 2022-05-09 ASSESSMENT — PAIN - FUNCTIONAL ASSESSMENT: PAIN_FUNCTIONAL_ASSESSMENT: 0-10

## 2022-05-09 ASSESSMENT — PAIN DESCRIPTION - DESCRIPTORS: DESCRIPTORS: ACHING

## 2022-05-09 NOTE — H&P
Hospital Medicine History & Physical      PCP: Ray Cornelius MD    Date of Admission: 2022    Date of Service: . MAY 9, 2022    Chief Complaint:  *SOB AND CHEST PAIN      History Of Present Illness:     62 y.o. female presented with  SOB AND CHEST PAIN, , TIGHTNESS IN CHARACTER, LOCATED ACW, NON RADIATING, CONTINUOUS WITH NAUSEA, DIAPHORESIS AND PALPITATIONS . SHE HAS A KNOWN HISTORY OF RA, HTN, AND RECENTLY TREATED FOR UTI.  WAS ON A WEIGHT LOSS DRUG YEARS AGO THAT WAS TIED TO  HEART MURMURS. Past Medical History:          Diagnosis Date    Arthritis     Hypertension        Past Surgical History:          Procedure Laterality Date    BREAST ENHANCEMENT SURGERY      DILATION AND CURETTAGE OF UTERUS      HERNIA REPAIR      TONSILLECTOMY         Medications Prior to Admission:      Prior to Admission medications    Medication Sig Start Date End Date Taking? Authorizing Provider   metoprolol succinate (TOPROL XL) 100 MG extended release tablet Take 100 mg by mouth daily   Yes Historical Provider, MD   meloxicam (MOBIC) 15 MG tablet Take 15 mg by mouth daily   Yes Historical Provider, MD   fluticasone (FLONASE) 50 MCG/ACT nasal spray 1 spray by Nasal route daily as needed for Rhinitis   Yes Historical Provider, MD       Allergies:  Penicillins and Penicillins    Social History:      The patient currently lives * ALONE    TOBACCO:   reports that she has never smoked. She has never used smokeless tobacco.  ETOH:   reports current alcohol use. Family History:      *MOM  OF CHF, DADS HISTORY IS UNKNOWN :      REVIEW OF SYSTEMS:   Pertinent positives as noted in the HPI. All other systems reviewed and negative.     PHYSICAL EXAM:    BP (!) 167/108   Pulse 58   Temp 98 °F (36.7 °C)   Resp 20   Ht 5' 8\" (1.727 m)   Wt 210 lb (95.3 kg)   SpO2 99%   BMI 31.93 kg/m²     General appearance:  No apparent distress, appears stated age and cooperative. HEENT:  Normal cephalic, atraumatic without obvious deformity. Pupils equal, round, and reactive to light. Extra ocular muscles intact. Conjunctivae/corneas clear. Neck: Supple, with full range of motion. No jugular venous distention. Trachea midline. Respiratory:  Normal respiratory effort. Clear to auscultation, bilaterally without Rales/Wheezes/Rhonchi. Cardiovascular:  Regular rate and rhythm   Abdomen: Soft, non-tender, non-distended with normal bowel sounds. Musculoskeletal:  No clubbing, cyanosis or edema bilaterally. Full range of motion without deformity. Skin: Skin color, texture, turgor normal.  No rashes or lesions. Neurologic:  Neurovascularly intact without any focal sensory/motor deficits. Cranial nerves: II-XII intact, grossly non-focal.  Psychiatric:  Alert and oriented, thought content appropriate, normal insight        Labs:     Recent Labs     05/09/22 0238   WBC 2.6*   HGB 12.4   HCT 37.8        Recent Labs     05/09/22 0238      K 3.5      CO2 22   BUN 13   CREATININE 0.9   CALCIUM 8.8     Recent Labs     05/09/22 0238   AST 15   ALT 8   BILITOT 0.4   ALKPHOS 113*     No results for input(s): INR in the last 72 hours. No results for input(s): Eva Breeze in the last 72 hours. Urinalysis:      Lab Results   Component Value Date    NITRU Negative 05/09/2022    WBCUA 5-10 05/09/2022    BACTERIA FEW 05/09/2022    RBCUA 1-3 05/09/2022    BLOODU TRACE-INTACT 05/09/2022    SPECGRAV >=1.030 05/09/2022    GLUCOSEU Negative 05/09/2022       Radiology:     CXR: I have reviewed the CXR with the following interpretation: **    CTA PULMONARY W CONTRAST   Final Result   No evidence of pulmonary embolism or acute pulmonary abnormality. Cardiomegaly. CT ABDOMEN PELVIS W IV CONTRAST Additional Contrast? None   Final Result   Diverticulosis of the colon.       A fat containing supraumbilical hernia. XR CHEST PORTABLE   Final Result   No acute findings. ASSESSMENT:  ACUTE CHF, ECHO PENDING   HTN   RHEUMATOID ARTHRITIS   HEART MURMUR (TIED TO WEIGHT LOSS DRUG YEARS AGO)  RECENT UTI        PLAN:  CARD CONSULT  CIPRO  BUMEX  ECHO      DVT Prophylaxis: *LOVENOX  Diet: ADULT DIET; Regular; Low Sodium (2 gm)  Code Status: Full Code    PT/OT Eval Status: LOVENOX    Dispo - HOME    Electronically signed by Hao Liu DO on 5/9/2022 at 3:50 PM Sera Roca       Thank you Ray Cornelius MD for the opportunity to be involved in this patient's care.  If you have any questions or concerns please feel free to contact me at [phone]

## 2022-05-09 NOTE — ED NOTES
N2N to [facility] CECILIA Mcmahan. Patient placed in transport queue.        Marian Fountain RN  05/09/22 [id number]

## 2022-05-09 NOTE — CARE COORDINATION
JUNIOR transition of care. Pt presented to [facility] ER secondary to abdominal pain and shortness of breath. Pt admitted with heart failure. Met with pt at bedside to discuss d/c planning. Pt and her daughter live in a 2 story home with pt bed and bath on second floor with full flight of steps. Pt reports she is independent with ADLs and drives. Pt has no hx of DME/HHC/SNF/ARU. Pt PCP is  [address] [place] [address] and she uses [facility] on [address] for Rx. Pt plans to d/c to home when medically stable. No needs identified. Pt will be able to call for a ride on the day of d/c.  CM/SW will follow. Chema Bernal RN CM.
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
## 2022-05-10 LAB
ANION GAP SERPL CALCULATED.3IONS-SCNC: 15 MMOL/L (ref 7–16)
BASOPHILS ABSOLUTE: 0.01 E9/L (ref 0–0.2)
BASOPHILS RELATIVE PERCENT: 0.4 % (ref 0–2)
BUN BLDV-MCNC: 15 MG/DL (ref 6–23)
CALCIUM SERPL-MCNC: 9.1 MG/DL (ref 8.6–10.2)
CHLORIDE BLD-SCNC: 106 MMOL/L (ref 98–107)
CO2: 24 MMOL/L (ref 22–29)
CREAT SERPL-MCNC: 1.1 MG/DL (ref 0.5–1)
EOSINOPHILS ABSOLUTE: 0.26 E9/L (ref 0.05–0.5)
EOSINOPHILS RELATIVE PERCENT: 10.9 % (ref 0–6)
GFR AFRICAN AMERICAN: >60
GFR NON-AFRICAN AMERICAN: >60 ML/MIN/1.73
GLUCOSE BLD-MCNC: 176 MG/DL (ref 74–99)
HCT VFR BLD CALC: 40.9 % (ref 34–48)
HEMOGLOBIN: 13 G/DL (ref 11.5–15.5)
IMMATURE GRANULOCYTES #: 0 E9/L
IMMATURE GRANULOCYTES %: 0 % (ref 0–5)
LYMPHOCYTES ABSOLUTE: 0.86 E9/L (ref 1.5–4)
LYMPHOCYTES RELATIVE PERCENT: 36 % (ref 20–42)
MAGNESIUM: 2 MG/DL (ref 1.6–2.6)
MCH RBC QN AUTO: 29 PG (ref 26–35)
MCHC RBC AUTO-ENTMCNC: 31.8 % (ref 32–34.5)
MCV RBC AUTO: 91.1 FL (ref 80–99.9)
MONOCYTES ABSOLUTE: 0.25 E9/L (ref 0.1–0.95)
MONOCYTES RELATIVE PERCENT: 10.5 % (ref 2–12)
NEUTROPHILS ABSOLUTE: 1.01 E9/L (ref 1.8–7.3)
NEUTROPHILS RELATIVE PERCENT: 42.2 % (ref 43–80)
PDW BLD-RTO: 12.9 FL (ref 11.5–15)
PLATELET # BLD: 164 E9/L (ref 130–450)
PMV BLD AUTO: 11.8 FL (ref 7–12)
POTASSIUM SERPL-SCNC: 4.4 MMOL/L (ref 3.5–5)
RBC # BLD: 4.49 E12/L (ref 3.5–5.5)
SODIUM BLD-SCNC: 145 MMOL/L (ref 132–146)
WBC # BLD: 2.4 E9/L (ref 4.5–11.5)

## 2022-05-10 PROCEDURE — 80048 BASIC METABOLIC PNL TOTAL CA: CPT

## 2022-05-10 PROCEDURE — G0378 HOSPITAL OBSERVATION PER HR: HCPCS

## 2022-05-10 PROCEDURE — 2580000003 HC RX 258: Performed by: NURSE PRACTITIONER

## 2022-05-10 PROCEDURE — 97165 OT EVAL LOW COMPLEX 30 MIN: CPT

## 2022-05-10 PROCEDURE — 99222 1ST HOSP IP/OBS MODERATE 55: CPT | Performed by: INTERNAL MEDICINE

## 2022-05-10 PROCEDURE — 97161 PT EVAL LOW COMPLEX 20 MIN: CPT

## 2022-05-10 PROCEDURE — APPSS60 APP SPLIT SHARED TIME 46-60 MINUTES: Performed by: PHYSICIAN ASSISTANT

## 2022-05-10 PROCEDURE — 2060000000 HC ICU INTERMEDIATE R&B

## 2022-05-10 PROCEDURE — 36415 COLL VENOUS BLD VENIPUNCTURE: CPT

## 2022-05-10 PROCEDURE — 97530 THERAPEUTIC ACTIVITIES: CPT

## 2022-05-10 PROCEDURE — 6360000002 HC RX W HCPCS: Performed by: INTERNAL MEDICINE

## 2022-05-10 PROCEDURE — 96372 THER/PROPH/DIAG INJ SC/IM: CPT

## 2022-05-10 PROCEDURE — 96376 TX/PRO/DX INJ SAME DRUG ADON: CPT

## 2022-05-10 PROCEDURE — 6370000000 HC RX 637 (ALT 250 FOR IP): Performed by: INTERNAL MEDICINE

## 2022-05-10 PROCEDURE — 6370000000 HC RX 637 (ALT 250 FOR IP): Performed by: PHYSICIAN ASSISTANT

## 2022-05-10 PROCEDURE — 93005 ELECTROCARDIOGRAM TRACING: CPT | Performed by: INTERNAL MEDICINE

## 2022-05-10 PROCEDURE — 2500000003 HC RX 250 WO HCPCS: Performed by: NURSE PRACTITIONER

## 2022-05-10 PROCEDURE — 85025 COMPLETE CBC W/AUTO DIFF WBC: CPT

## 2022-05-10 PROCEDURE — 94640 AIRWAY INHALATION TREATMENT: CPT

## 2022-05-10 PROCEDURE — 6370000000 HC RX 637 (ALT 250 FOR IP): Performed by: NURSE PRACTITIONER

## 2022-05-10 PROCEDURE — 83735 ASSAY OF MAGNESIUM: CPT

## 2022-05-10 RX ORDER — METOPROLOL SUCCINATE 50 MG/1
50 TABLET, EXTENDED RELEASE ORAL DAILY
Status: DISCONTINUED | OUTPATIENT
Start: 2022-05-11 | End: 2022-05-13 | Stop reason: HOSPADM

## 2022-05-10 RX ORDER — AMLODIPINE BESYLATE 5 MG/1
5 TABLET ORAL DAILY
Status: DISCONTINUED | OUTPATIENT
Start: 2022-05-10 | End: 2022-05-11

## 2022-05-10 RX ADMIN — BUMETANIDE 0.5 MG: 0.25 INJECTION INTRAMUSCULAR; INTRAVENOUS at 07:42

## 2022-05-10 RX ADMIN — ALBUTEROL SULFATE 2.5 MG: 2.5 SOLUTION RESPIRATORY (INHALATION) at 09:45

## 2022-05-10 RX ADMIN — AMLODIPINE BESYLATE 5 MG: 5 TABLET ORAL at 12:21

## 2022-05-10 RX ADMIN — ALBUTEROL SULFATE 2.5 MG: 2.5 SOLUTION RESPIRATORY (INHALATION) at 15:54

## 2022-05-10 RX ADMIN — PANTOPRAZOLE SODIUM 40 MG: 40 TABLET, DELAYED RELEASE ORAL at 05:26

## 2022-05-10 RX ADMIN — ACETAMINOPHEN 650 MG: 325 TABLET ORAL at 21:44

## 2022-05-10 RX ADMIN — DOCUSATE SODIUM 100 MG: 100 CAPSULE, LIQUID FILLED ORAL at 21:44

## 2022-05-10 RX ADMIN — ALBUTEROL SULFATE 2.5 MG: 2.5 SOLUTION RESPIRATORY (INHALATION) at 13:32

## 2022-05-10 RX ADMIN — ACETAMINOPHEN 650 MG: 325 TABLET ORAL at 07:42

## 2022-05-10 RX ADMIN — SENNOSIDES 8.6 MG: 8.6 TABLET, COATED ORAL at 07:45

## 2022-05-10 RX ADMIN — SODIUM CHLORIDE, PRESERVATIVE FREE 10 ML: 5 INJECTION INTRAVENOUS at 07:42

## 2022-05-10 RX ADMIN — ALBUTEROL SULFATE 2.5 MG: 2.5 SOLUTION RESPIRATORY (INHALATION) at 19:19

## 2022-05-10 RX ADMIN — CIPROFLOXACIN 500 MG: 500 TABLET, FILM COATED ORAL at 07:42

## 2022-05-10 RX ADMIN — ENOXAPARIN SODIUM 40 MG: 100 INJECTION SUBCUTANEOUS at 07:42

## 2022-05-10 RX ADMIN — SODIUM CHLORIDE, PRESERVATIVE FREE 10 ML: 5 INJECTION INTRAVENOUS at 21:44

## 2022-05-10 RX ADMIN — METOPROLOL SUCCINATE 100 MG: 100 TABLET, FILM COATED, EXTENDED RELEASE ORAL at 07:42

## 2022-05-10 ASSESSMENT — PAIN DESCRIPTION - LOCATION: LOCATION: ABDOMEN

## 2022-05-10 ASSESSMENT — PAIN SCALES - GENERAL
PAINLEVEL_OUTOF10: 8
PAINLEVEL_OUTOF10: 8

## 2022-05-10 ASSESSMENT — PAIN DESCRIPTION - ORIENTATION: ORIENTATION: LOWER;RIGHT;LEFT

## 2022-05-10 NOTE — PROGRESS NOTES
Hospitalist Progress Note      PCP: Marietta Morales MD    Date of Admission: 5/9/2022        Hospital Course:  *62 y.o. female presented with  SOB AND CHEST PAIN, , TIGHTNESS IN CHARACTER, LOCATED ACW, NON RADIATING, CONTINUOUS WITH NAUSEA, DIAPHORESIS AND PALPITATIONS . SHE HAS A KNOWN HISTORY OF RA, HTN, AND RECENTLY TREATED FOR UTI.  WAS ON A WEIGHT LOSS DRUG YEARS AGO THAT WAS TIED TO  HEART MURMURS**        Subjective: ** FEELING  BETTER          Medications:  Reviewed    Infusion Medications    sodium chloride       Scheduled Medications    [START ON 5/11/2022] metoprolol succinate  50 mg Oral Daily    amLODIPine  5 mg Oral Daily    sodium chloride flush  5-40 mL IntraVENous 2 times per day    bumetanide  0.5 mg IntraVENous Daily    ciprofloxacin  500 mg Oral Daily    docusate sodium  100 mg Oral Nightly    albuterol  2.5 mg Nebulization 4x daily    pantoprazole  40 mg Oral QAM AC    enoxaparin  40 mg SubCUTAneous Daily     PRN Meds: [START ON 5/11/2022] regadenoson, sodium chloride flush, sodium chloride, ondansetron **OR** ondansetron, acetaminophen **OR** acetaminophen, perflutren lipid microspheres, potassium chloride **OR** potassium alternative oral replacement **OR** potassium chloride, senna, albuterol      Intake/Output Summary (Last 24 hours) at 5/10/2022 1823  Last data filed at 5/10/2022 0956  Gross per 24 hour   Intake 360 ml   Output --   Net 360 ml       Exam:    BP (!) 143/77   Pulse 61   Temp 96.9 °F (36.1 °C) (Temporal)   Resp 18   Ht 5' 8\" (1.727 m)   Wt 210 lb 8 oz (95.5 kg)   SpO2 98%   BMI 32.01 kg/m²       General appearance:  No apparent distress,   HEENT:  Normal cephalic, atraumatic without obvious deformity. Neck: Supple, with full range of motion. No jugular venous distention. Trachea midline. Respiratory:  Normal respiratory effort. Clear to auscultation, bilaterally without Rales/Wheezes/Rhonchi.   Cardiovascular:  Regular rate and rhythm   Abdomen: Soft, non-tender, non-distended with normal bowel sounds. Musculoskeletal:  No clubbing, cyanosis or edema bilaterally. Skin: Skin color, texture, turgor normal.  No rashes or lesions. Neurologic:  Neurovascularly intact without any focal sensory/motor deficits. Cranial nerves: II-XII intact, grossly non-focal.  Psychiatric:  Alert and oriented, thought content appropriate, normal insight             Labs:   Recent Labs     05/09/22 0238 05/10/22  0819   WBC 2.6* 2.4*   HGB 12.4 13.0   HCT 37.8 40.9    164     Recent Labs     05/09/22 0238 05/10/22  0819    145   K 3.5 4.4    106   CO2 22 24   BUN 13 15   CREATININE 0.9 1.1*   CALCIUM 8.8 9.1     Recent Labs     05/09/22 0238   AST 15   ALT 8   BILITOT 0.4   ALKPHOS 113*     No results for input(s): INR in the last 72 hours. No results for input(s): Cassia Beat in the last 72 hours. Recent Labs     05/09/22 0238   AST 15   ALT 8   BILITOT 0.4   ALKPHOS 113*     Recent Labs     05/09/22 0238 05/09/22  0514   LACTA 2.6* 2.0     No results found for: Gretchen Gottron  No results found for: AMMONIA    Assessment:  ACUTE CHF, ECHO PENDING   HTN   RHEUMATOID ARTHRITIS   HEART MURMUR (TIED TO WEIGHT LOSS DRUG YEARS AGO)  RECENT UTI           PLAN:  CARD CONSULT  CIPRO  BUMEX  ECHO   STRESS IN AM     DVT Prophylaxis: *LOVENOX  Diet: ADULT DIET; Regular;  Low Sodium (2 gm)  Code Status: Full Code     PT/OT Eval Status: LOVENOX     Dispo - HOME       Electronically signed by Feroz Bell DO on 5/10/2022 at 6:23 PM [facility]

## 2022-05-10 NOTE — PLAN OF CARE
Problem: Discharge Planning  Goal: Discharge to home or other facility with appropriate resources  Outcome: Progressing     Problem: Pain  Goal: Verbalizes/displays adequate comfort level or baseline comfort level  Outcome: Progressing     Problem: Safety - Adult  Goal: Free from fall injury  Outcome: Progressing     Problem: Cardiovascular - Adult  Goal: Maintains optimal cardiac output and hemodynamic stability  Outcome: Progressing     Problem: Metabolic/Fluid and Electrolytes - Adult  Goal: Hemodynamic stability and optimal renal function maintained  Outcome: Progressing     Problem: Chronic Conditions and Co-morbidities  Goal: Patient's chronic conditions and co-morbidity symptoms are monitored and maintained or improved  Outcome: Progressing

## 2022-05-10 NOTE — PLAN OF CARE
Problem: Discharge Planning  Goal: Discharge to home or other facility with appropriate resources  5/9/2022 2150 by Bradley Houston RN  Outcome: Progressing  5/9/2022 1855 by Tuan Roca RN  Outcome: Progressing     Problem: Pain  Goal: Verbalizes/displays adequate comfort level or baseline comfort level  5/9/2022 2150 by Bradley Houston RN  Outcome: Progressing  5/9/2022 1855 by Tuan Roca RN  Outcome: Progressing     Problem: Safety - Adult  Goal: Free from fall injury  5/9/2022 2150 by Bradley Houston RN  Outcome: Progressing  5/9/2022 1855 by Tuan Roca RN  Outcome: Progressing

## 2022-05-10 NOTE — CARE COORDINATION
Admission Note      Reason for admission/[facility] Symptom: Patient admitted to [facility] due to im feeling down lately. I have been to therapy twice. She thinks I have a eating disorder. Pt says she eats and then throws up.pt says she weighs herself evry hour. Pt says her main stressor is her weight. My job is really hard and I hate it. I work nights, so im not able to sleep. Pt says she is suicidal with thoughts of overdose. Pt denies being on any medications. I live with my aunt , my mother kicked me out. she picks men over her children. im really insignificant in this world. Pt denies past psych history. Pt denies avh, hi. Pt says she Is using xanax and adderall that isnt prescribed to her  Diagnoses: Depression NOS  UDS: Benzodiazepine, Cannabinoid, Cocaine  BAL:  Neg    SW met with treatment team to discuss patient's treatment including care planning, discharge planning, and follow-up needs. Pt has been admitted to [facility]. Treatment team has identified patient's discharge needs as medication management and outpatient therapy/counseling. Pt confirmed  the need for ongoing treatment post inpatient stay. Pt was also provided a handout of contact information for drug and alcohol treatment centers and other community support service such as [facility], AA, and [facility]. Confirmed with Pt that Discharge Plan is to return home when medically stable. Pt declined [facility] stating that she can take care of herself. Pt will call family/ friend for a ride at discharge. VIRGILIO/JUNIOR to follow for discharge needs.    Brent Hyde, L.S.W.  [phone]

## 2022-05-10 NOTE — PROGRESS NOTES
[address]        Date:5/10/2022                                                  Patient Name: Maria Guadalupe Morin    MRN: [id number]    : 1960    [facility]          Evaluating OT: Andree Ortiz OTDANDRE/AURORA; [id number]       Referring Provider: Heraclio Bar DO    Specific Provider Orders/Date: OT Eval and Treat 22       Diagnosis: Acute congestive heart failure; Acute dyspnea     Surgery: None this admission     Pertinent Medical History:  has a past medical history of Arthritis and Hypertension.      Recommended Adaptive Equipment: TBD     Precautions:  Fall Risk     Assessment of current deficits    [x] Functional mobility  [x]ADLs  [x] Strength               []Cognition    [x] Functional transfers   [x] IADLs         [x] Safety Awareness   [x]Endurance    [] Fine Coordination              [x] Balance      [] Vision/perception   []Sensation     []Gross Motor Coordination  [] ROM  [] Delirium                   [] Motor Control     OT PLAN OF CARE   OT POC based on physician orders, patient diagnosis and results of clinical assessment    Frequency/Duration 1-3 days/wk for 2 weeks PRN   Specific OT Treatment Interventions to include:   * Instruction/training on adapted ADL techniques and AE recommendations to increase functional independence within precautions       * Training on energy conservation strategies, correct breathing pattern and techniques to improve independence/tolerance for self-care routine  * Functional transfer/mobility training/DME recommendations for increased independence, safety, and fall prevention  * Patient/Family education to increase follow through with safety techniques and functional independence  * Recommendation of environmental modifications for increased safety with functional transfers/mobility and ADLs  * Therapeutic exercise to improve motor endurance, ROM, and functional strength for ADLs/functional transfers  * Therapeutic activities to facilitate/challenge dynamic balance, stand tolerance for increased safety and independence with ADLs    Home Living: Pt lives with fifaviola in 2 story home with 4 steps & 1 handrail to enter. Bed & bath located on 2nd floor with full flight & 1 handrail. 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 Bathroom setup: Walk-in shower in basement & Tub/shower 2nd floor   Equipment owned: None    Prior Level of Function: IND with ADLs , IND with IADLs; ambulated without AD  Driving: Yes  Occupation: providing childcare services    Pain Level: Pt c/o 8/10 abdominal pain; therapist provided repositioning techniques  Cognition: A&O: 4/4; Follows multi step directions   Memory:  Good   Sequencing:  Fair+   Problem solving:  Fair+   Judgement/safety:  Fair+     Functional Assessment:  AM-PAC Daily Activity Raw Score: 19/24   Initial Eval Status  Date: 5/10/22 Treatment Status  Date: STGs = LTGs  Time frame: 10-14 days   Feeding Independent      Grooming Supervision   Independent    UB Dressing Supervision   Independent    LB Dressing Stand by Assist   Independent    Bathing Stand by Assist  Independent    Toileting Stand by Assist   Independent    Bed Mobility  Supine to sit: Supervision  Sit to supine: Supervision   Supine to sit: Independent   Sit to supine: Independent    Functional Transfers Sit to stand:SBA   Stand to sit:SBA  Stand pivot: SBA  Commode: SBA  Sit to stand:Independent   Stand to sit: Independent  Stand pivot: Independent  Commode: Independent    Functional Mobility SBA  No use of AD further than household distance  Independent    Balance Sitting:     Static - Modified [place]     Dynamic - Supervision  Standing: SBA  Sitting:     Static: Independent     Dynamic: Independent  Standing: Independent   Activity Tolerance Fair+  Good   Visual/  Perceptual Glasses: Yes  Appears WFL      Safety Fair+  Good  during ADL completion     Hand Dominance Right   AROM (PROM) Strength Additional Info:  Goal:   RUE  WFL 4/5 grossly tested good  and wfl FMC/dexterity noted during ADL tasks   Improve overall RUE strength WFL for participation in functional tasks       LUE WFL 4/5 grossly tested Good  and wfl FMC/dexterity noted during ADL tasks   Improve overall LUE strength WFL for participation in functional tasks       [facility]   Sensation:  No c/o numbness or tingling BUE  Tone: WFL BUE  Edema: Unremarkable    Comment: Cleared by RN to see pt. Upon arrival patient supine in bed and agreeable to OT session with fiance in room. At end of session, patient supine in bed with call light and phone within reach, all lines and tubes intact. Overall patient demonstrated decreased independence and safety during completion of ADL/functional transfer/mobility tasks. Therapist facilitated ADL tasks, functional transfers, functional mobility, bed mobility to address Independnet & safety awareness throughout functional tasks. Pt sat EOB to don/doff socks. Pt stood at sink ~5 minutes to wash face & hands. Pt simulated toileting task on commode in bathroom. Pt completed functional mobility to<>from bathroom without use of AD. Pt with no c/o dizziness or lightheadedness throughout duration of session. Pt demo'd min guarding of abdomed d/t pain throughout ADL tasks. Pt & family educated on safety awareness & activity modifications/adaptations to promote independence throughout functional tasks. Pt on RA upon arrival - SpO2 95% & above throughout duration of session. Pt would benefit from continued skilled OT to increase safety and independence with completion of ADL/IADL tasks for functional independence and quality of life.     Rehab Potential: Good for established goals     LTG: maximize independence with ADLs to return to PLOF    Patient and/or family were instructed on functional diagnosis, prognosis/goals and OT plan of care. Demonstrated fair understanding. Eval Complexity: Low  · History: Expanded chart review of medical records and additional review of physical, cognitive, or psychosocial history related to current functional performance  · Exam: 3+ performance deficits  · Assistance/Modification: Min/mod assistance or modifications required to perform tasks. May have comorbidities that affect occupational performance. Evaluation time includes thorough review of current medical information, gathering information on past medical & social history & PLOF, completion of standardized testing, informal observation of tasks, consultation with other medical professions/disciplines, assessment of data & development of POC/goals. Time In: 1:45p  Time Out: 2:00p  Total Treatment Time: 0 minutes    Min Units   OT Eval Low 68680  x     OT Eval Medium 21224      OT Eval High 07182      OT Re-Eval [id number]       Therapeutic Ex 56503       Therapeutic Activities 30687       ADL/Self Care 43162       Orthotic Management 29106       Manual 57461     Neuro Re-Ed 61425       Non-Billable Time          Evaluation Time additionally includes thorough review of current medical information, gathering information on past medical history/social history and prior level of function, interpretation of standardized testing/informal observation of tasks, assessment of data and development of plan of care and goals.           Edward Nielson OTR/L; [id number]

## 2022-05-10 NOTE — PLAN OF CARE
Patient's chart updated to reflect:      . - HF care plan, HF education points and HF discharge instructions.  -Orders: 2 gram sodium diet, daily weights, I/O.  -PCP and cardiology follow up appointments to be scheduled within 7 days of hospital discharge.   -CHF education session will be provided to the patient prior to hospital discharge.  -Awaiting echo and cardiology consult completion  Bhupinder Crawford RN RN, BSN  Heart Failure Navigator

## 2022-05-10 NOTE — PROGRESS NOTES
Physical Therapy  Physical Therapy Initial Assessment     Name: Genet Kaplan  : 1960  MRN: [id number]      Date of Service: 5/10/2022    Evaluating PT:  Cate Sanders PT, DPT [id number]    Room #:  1713/8872-D  Diagnosis:  Shortness of breath [R06.02]  Heart failure ([address] [I50.9]  Near syncope [R55]  Abdominal pain, unspecified abdominal location [R10.9]  Acute congestive heart failure, unspecified heart failure type ([address] [I50.9]  Congestive heart failure, unspecified HF chronicity, unspecified heart failure type ([address] [I50.9]  PMHx/PSHx:  HTN  Procedure/Surgery:  None  Precautions:  Falls  Equipment Needs:  TBD  Equipment Owned:  None    SUBJECTIVE:    Pt lives with significant other in a 2 story home + basement with 4 stair(s) to enter and 1 rail(s). Bed is on 2nd floor and bath is on 2nd floor. There is a full flight of stairs with 1 rail to 2nd floor; full flight of stairs with 1 rail to basement. Pt ambulated with no AD PTA. OBJECTIVE:   Initial Evaluation  Date: 5/10/2022 Treatment Short Term/ Long Term   Goals   AM-PAC 6 Clicks 67/49     Was pt agreeable to Eval/treatment? Yes     Does pt have pain? Mild abdomen     Bed Mobility  Rolling: NT  Supine to sit: SBA (HOB elevated)  Sit to supine: SBA (HOB elevated)  Scooting: NT  Rolling: Independent  Supine to sit: Independent  Sit to supine: Independent  Scooting: Independent   Transfers Sit to stand: SBA  Stand to sit: SBA  Stand pivot: NT  Sit to stand: Independent  Stand to sit:  Independent  Stand pivot: Independent with no AD   Ambulation    15', 15', 130' with no AD SBA  150 feet with no AD Independent   Stair negotiation: ascended and descended  NT  12 step(s) with 1 rail(s) Emma   ROM BUE:  See OT note  BLE:  WFL     Strength BUE:  See OT note  BLE:  Grossly 4/5     Balance Sitting EOB:  Independent  Dynamic Standing:  SBA with no AD  Sitting EOB:  Independent  Dynamic Standing:  Independent with no AD     Pt is A & O x 4  Sensation:  No reports of numbness/tingling to extremities  Edema:  Unremarkable    Vitals:   HR 59, SpO2 95% at rest.  HR 64, SpO2 97% following activity. Patient education  Pt educated on role of PT, safety during functional mobility, use of call light for assistance. Patient response to education:   Pt verbalized understanding Pt demonstrated skill Pt requires further education in this area   Yes Yes Yes     ASSESSMENT:    Conditions Requiring Skilled Therapeutic Intervention:    [x]Decreased strength     []Decreased ROM  [x]Decreased functional mobility  [x]Decreased balance   [x]Decreased endurance   [x]Decreased posture  []Decreased sensation  []Decreased coordination   []Decreased vision  [x]Decreased safety awareness   [x]Increased pain       Comments:  Session cleared by nursing. Patient in semi-Demarco's position upon arrival; agreeable to PT evaluation with OT collaboration. Required increased time to perform bed mobility and functional transfers. Ambulated to/from bathroom, where care was transferred to OT; following rest break, performed ambulation bout of greater distance. Ambulated with decreased speed and steadiness. Denied dizziness, shortness of breath. Vitals monitored and noted. Patient left in semi-Demarco's position with call light in reach. Patient would benefit from continued skilled PT services to address functional deficits and prevent deconditioning. Treatment:  Patient practiced and was instructed in the following treatment:     Bed mobility - performed supine <> sit with HOB elevated; physical assistance provided as needed during activity   Functional transfers - performed multiple sit <> stand transfers; physical assistance provided as needed during activity   Ambulation - performed multiple ambulation bouts; physical assistance provided as needed during activity    Pt's/ family goals   1. Return home    Prognosis is good for reaching above PT goals.     Patient and or family understand(s) diagnosis, prognosis, and plan of care. Yes    PHYSICAL THERAPY PLAN OF CARE:    PT POC is established based on physician order and patient diagnosis     Referring provider/PT Order:    05/09/22 1200  PT evaluation and treat  Start:  05/09/22 1200,   End:  05/09/22 1200,   ONE TIME,   Standing Count:  1 Occurrences,   R         Kristy Kyle, APRN - CNP       Diagnosis:  Shortness of breath [R06.02]  Heart failure ([address] [I50.9]  Near syncope [R55]  Abdominal pain, unspecified abdominal location [R10.9]  Acute congestive heart failure, unspecified heart failure type ([address] [I50.9]  Congestive heart failure, unspecified HF chronicity, unspecified heart failure type ([address] [I50.9]  Specific instructions for next treatment:  Continue to facilitate safe performance of functional mobility; progress as appropriate. Current Treatment Recommendations:     [x] Strengthening to improve independence with functional mobility   [] ROM to improve independence with functional mobility   [x] Balance Training to improve static/dynamic balance and to reduce fall risk  [x] Endurance Training to improve activity tolerance during functional mobility   [x] Transfer Training to improve safety and independence with all functional transfers   [x] Gait Training to improve gait mechanics, endurance and assess need for appropriate assistive device  [x] Stair Training in preparation for safe discharge home and/or into the community   [x] Positioning to prevent skin breakdown and contractures  [x] Safety and Education Training   [x] Patient/Caregiver Education   [] HEP  [] Other     PT long term treatment goals are located in above grid    Frequency of treatments: 2-5x/week x 1-2 weeks.     Time in  1345  Time out  1405    Total Treatment Time  10 minutes     Evaluation Time includes thorough review of current medical information, gathering information on past medical history/social history and prior level of function, completion of standardized testing/informal observation of tasks, assessment of data and education on plan of care and goals.     CPT codes:  [x] Low Complexity PT evaluation 76847  [] Moderate Complexity PT evaluation 26580  [] High Complexity PT evaluation 36570  [] PT Re-evaluation 35059  [] Gait training 61710 0 minutes  [] Manual therapy 29420 0 minutes  [x] Therapeutic activities 86444 10 minutes  [] Therapeutic exercises 55316 0 minutes  [] Neuromuscular reeducation 25515 0 minutes     Jenifer Guerin, PT, DPT  [id number]

## 2022-05-10 NOTE — PROGRESS NOTES
Addendum:  Juliet Mcmahan MD    Reason for consult: CHF    Patient previously not known to [id number] [facility] Cardiology     History of Present illness:  62year old with history of HTN, Non-morbid obesity presented to [facility] ED 5/9/2022 for abdominal pain and shortness of breath. Cardiology was consulted for chest pain, shortness of breath, CHF. She is having SOBOE for few days, No PND or orthopnea. No palpitations. Intermittent chest pain, mid sternal, worse with walking especially up steps and made better with rest.  pain is like tightness, no radiation, associated with some nausea. Relieve on its own. No fever or chills. Compliant with her medications. BP runs high at home. She is also complaining of lower abdominal pain and was found to have a UTI which she is being treated for with an antibiotic. Labs showed Potassium 3.5, BUN 13, creatinine 0.9, lactic acid 2.6> 2.0, glucose 177, proBNP 1421,HScTNT 9> 10> 9> 18, alk phos 113, ethanol 78, WBC 2.6, H&H 12.4/37.8, platelet 603, D-dimer 641, COVID-19 not detected. CXR: No acute process. CTA pulmonary: Already on medically. No evidence of PE. CT abdomen with contrast: Diverticulosis. Fat-containing supra umbilical hernia. Upon assessment today 5/10/2022 patient is asleep lying supine in bed. Patient was awoken and is alert and oriented, speaking full sentences, and appears in no distress.      Review of systems:  Review of 10 systems negative except as mentioned in the HPI    PAST MEDICAL HISTORY:    Hypertension   Rheumatoid Arthritis  Non-morbid obesity      Past Surgical History:  Breast enhancement surgery  Dilation encourage of uterus  Hernia repair, tonsillectomy     HOME MEDICATIONS:  Home Medications           Prior to Admission medications    Medication Sig Start Date End Date Taking?  Authorizing Provider   metoprolol succinate (TOPROL XL) 100 MG extended release tablet Take 100 mg by mouth daily     Yes Historical Provider, MD   meloxicam (MOBIC) 15 MG tablet Take 15 mg by mouth daily     Yes Historical Provider, MD   fluticasone (FLONASE) 50 MCG/ACT nasal spray 1 spray by Nasal route daily as needed for Rhinitis     Yes Historical Provider, MD             ALLERGIES:  Penicillins and Penicillins     SOCIAL HISTORY:    Housing:  Tobacco:  EtOH: Drinks wine daily  Drugs:  Oxygen:  Ambulation:     FAMILY HISTORY:   Mother: CHF    Scheduled Meds:   [START ON 5/11/2022] metoprolol succinate  50 mg Oral Daily    amLODIPine  5 mg Oral Daily    sodium chloride flush  5-40 mL IntraVENous 2 times per day    bumetanide  0.5 mg IntraVENous Daily    ciprofloxacin  500 mg Oral Daily    docusate sodium  100 mg Oral Nightly    albuterol  2.5 mg Nebulization 4x daily    pantoprazole  40 mg Oral QAM AC    enoxaparin  40 mg SubCUTAneous Daily     Continuous Infusions:   sodium chloride       PRN Meds:.[START ON 5/11/2022] regadenoson, sodium chloride flush, sodium chloride, ondansetron **OR** ondansetron, acetaminophen **OR** acetaminophen, perflutren lipid microspheres, potassium chloride **OR** potassium alternative oral replacement **OR** potassium chloride, senna, albuterol      Labs/imaging studies: Reviewed. Our INDER exam, assessment reviewed and reflect my work. I personally saw, examined, and evaluated the patient today. I spent more than 50% of total consult time today. I personally reviewed the medications, rhythm strips, pertinent labs and test reports. I directly participated in the medical-decision making, ordering pertinent tests and medication adjustment. Physical exam:     Vitals:    05/10/22 1220   BP: 132/74   Pulse: 58   Resp: 18   Temp: 96.4 °F (35.8 °C)   SpO2: 95%       In general, this is a well developed, well nourished who appears stated age. awake, alert, cooperative, no apparent distress    HEENT: eyes -conjunctivae pink,   Neck-  no stridor, no carotid bruit.  no jugular venous distention   RESPIRATORY: Chest symmetrical and non-tender to palpation. No accessory muscles use. Lung auscultation - few rhonchi  CARDIOVASCULAR:     Heart Palpation - no palpable thrills  Heart Ausculation - Regular rate and rhythm, 7-4/2 systolic murmur, No s3 or rub. No lower extremity edema, Distal pulses palpable, no cyanosis   ABDOMEN: Soft, nontender,  Bowel sounds present. MS: n/a. : Deferred  Rectal Exam: Deferred  SKIN: warm and dry  NEURO / PSYCH: oriented to person, place        Impression/Recommendations:    Acute Heart Failure - Likely diastolic, pending her Echo. Continue diuretics-Monitor daily Wts, I/Os, BP, renal Fn    CP - No recurrence, Troponins flat; EKG reviewed-ST/T chagnes ischemia vs. LVH; Had stress test 10-15 years ago. Stress test tomorrow    Sinus bradycardia - Asymptomatic, decrease Metoprolol, Monitor HR, TSH OK    Essential HTN - Add Amlodipine and monitor BP    Alcohol use - Counseled to quit drinking     Hx of Rheumatoid Arthritis - Per Dr Az Lemus    Non-morbid obesity - Lost 20 lbs in few months, Diet, exercise as tolerates and continued weight loss discussed. Tentative discahrge tomorrow, if stress test OK    Above d/w her and her , and all questions answered. Thank you for the consult.         Kal Harris MD  5/10/2022  12:27 PM  [facility]) Cardiology

## 2022-05-10 NOTE — CONSULTS
Nutrition Education    · Educated on Heart Failure Low Sodium Nutrition Therapy  · Learners: Patient  · Readiness: Acceptance  · Method: Explanation and Handout  · Response: Verbalizes Understanding  · Contact name and number provided.     Susan Schroeder MS, RD, LD  Contact Number: [phone]

## 2022-05-10 NOTE — CONSULTS
Inpatient [facility] Cardiology Consultation      Reason for Consult: SOB and CP    Consulting Physician: Dr. Anuradha Fournier    Requesting Physician: Dr. Barb Ramirez DO    Date of Consultation: 5/10/2022    HISTORY OF PRESENT ILLNESS:   Velasquez Pemberton is a 27-year-old female who is not previously known to [facility] Cardiology. She is being seen in consultation this hospital admission by Dr. Anuradha Fournier for evaluation and recommendations regarding SOB and CP. PMHx: HTN, rheumatoid arthritis, obesity    HPI:  · The patient presented to [facility] ED 5/9/2022 for abdominal pain and shortness of breath. Cardiology was consulted for chest pain, shortness of breath, CHF. · Labs: Potassium 3.5, BUN 13, creatinine 0.9, lactic acid 2.6> 2.0, glucose 177, proBNP 1421,HScTNT 9> 10> 9> 18, alk phos 113, ethanol 78, WBC 2.6, H&H 12.4/37.8, platelet 191, D-dimer 641, COVID-19 not detected. · CXR: No acute process. CTA pulmonary: Already on medically. No evidence of PE.        CT abdomen with contrast: Diverticulosis. Fat-containing supra umbilical hernia. · Upon assessment today 5/10/2022 patient is asleep lying supine in bed. Patient was awoken and is alert and oriented, speaking full sentences, and appears in no distress. Patient reports that she has had exertional chest pain/SOB x 3 days which is made worse with walking especially up steps and made better with rest.  She is also complaining of lower abdominal pain and was found to have a UTI which she is being treated for with an antibiotic. · Vital signs today respirations 18, pulse 52, 157/81, 98% on room air      Please note: past medical records were reviewed per electronic medical record (EMR) - see detailed reports under Past Medical/ Surgical History. PAST MEDICAL HISTORY:    · Hypertension, currently on Toprol-XL  · Rheumatoid arthritis on Enbrel  · Breast enhancement surgery, D&C of uterus, hernia repair, tonsillectomy.       HOME MEDICATIONS:  Prior to Admission medications    Medication Sig Start Date End Date Taking?  Authorizing Provider   metoprolol succinate (TOPROL XL) 100 MG extended release tablet Take 100 mg by mouth daily   Yes Historical Provider, MD   meloxicam (MOBIC) 15 MG tablet Take 15 mg by mouth daily   Yes Historical Provider, MD   fluticasone (FLONASE) 50 MCG/ACT nasal spray 1 spray by Nasal route daily as needed for Rhinitis   Yes Historical Provider, MD       CURRENT MEDICATIONS:      Current Facility-Administered Medications:     sodium chloride flush 0.9 % injection 5-40 mL, 5-40 mL, IntraVENous, 2 times per day, Leopold Mendez, APRN - CNP, 10 mL at 05/10/22 0742    sodium chloride flush 0.9 % injection 5-40 mL, 5-40 mL, IntraVENous, PRN, Leslie Jeffersonless, APRN - CNP    0.9 % sodium chloride infusion, , IntraVENous, PRN, Leopold Mendez, APRN - CNP    ondansetron (ZOFRAN-ODT) disintegrating tablet 4 mg, 4 mg, Oral, Q8H PRN **OR** ondansetron (ZOFRAN) injection 4 mg, 4 mg, IntraVENous, Q6H PRN, Leopold Mendez, APRN - CNP    acetaminophen (TYLENOL) tablet 650 mg, 650 mg, Oral, Q6H PRN, 650 mg at 05/10/22 0742 **OR** acetaminophen (TYLENOL) suppository 650 mg, 650 mg, Rectal, Q6H PRN, Leopold Mendez, APRN - CNP    perflutren lipid microspheres (DEFINITY) injection 1.65 mg, 1.5 mL, IntraVENous, ONCE PRN, Leopold Mendez, APRN - CNP    potassium chloride (KLOR-CON M) extended release tablet 40 mEq, 40 mEq, Oral, PRN **OR** potassium bicarb-citric acid (EFFER-K) effervescent tablet 40 mEq, 40 mEq, Oral, PRN **OR** potassium chloride 10 mEq/100 mL IVPB (Peripheral Line), 10 mEq, IntraVENous, PRN, Leopold Mendez, APRN - CNP    senna (SENOKOT) tablet 8.6 mg, 1 tablet, Oral, Daily PRN, Leopold Mendez, APRN - CNP, 8.6 mg at 05/10/22 0745    bumetanide (BUMEX) injection 0.5 mg, 0.5 mg, IntraVENous, Daily, Leopold Rei, APRN - CNP, 0.5 mg at 05/10/22 0742    albuterol (ACCUNEB) nebulizer solution 0.63 mg, 0.63 mg, Nebulization, Q6H PRN, black/bloody, and tarry stools. · Genitourinary: Admits to dysuria, denies hematuria. · Hematologic: Denies excessive bruising or bleeding. · Endocrine: Denies excessive thirst. Denies intolerance to hot and cold  · Psychiatric: Denies anxiety and depression. PHYSICAL EXAM:   BP (!) 157/81   Pulse 52   Temp 96.8 °F (36 °C) (Temporal)   Resp 18   Ht 5' 8\" (1.727 m)   Wt 210 lb 8 oz (95.5 kg)   SpO2 98%   BMI 32.01 kg/m²   CONST:  Well developed, well nourished 61-year-old -American female who appears stated age. Awake, alert, cooperative, no apparent distress. HEENT:   Head- Normocephalic, atraumatic. Eyes- Conjunctivae pink, anicteric. Neck-  No stridor, trachea midline, no apparent jugular venous distention. CHEST: Chest symmetrical and non-tender to palpation. No accessory muscle use or intercostal retractions. RESPIRATORY: Lung sounds - clear throughout fields. No wheezing, rales or rhonchi. CARDIOVASCULAR:     No noted carotid bruit. Heart Ausculation- Regular rate and rhythm, no apparent murmur. PV: No lower extremity edema. No varicosities. Pedal pulses palpable, no clubbing or cyanosis. ABDOMEN: Soft, tender to light palpation. Bowel sounds present. MS: Good muscle strength and tone. No atrophy or abnormal movements. SKIN: Warm and dry. No statis dermatitis or ulcers. NEURO / PSYCH: Oriented to person, place and time. Speech clear and appropriate. Follows all commands. Pleasant affect. DATA:    EKG: Sinus rhythm with first degree AV block, vent rate 68, LA interval 220, QRS duration 102  Telemetry: Sinus bradycardia with occasional PACs (53)    Diagnostic:  All diagnostic testing and lab work thus far this admission reviewed in detail.     Labs:   CBC:   Recent Labs     05/09/22  0238   WBC 2.6*   HGB 12.4   HCT 37.8        BMP:   Recent Labs     05/09/22  0238      K 3.5   CO2 22   BUN 13   CREATININE 0.9   LABGLOM >60   CALCIUM 8.8     TSH:   Recent Labs     05/09/22  0905   TSH 0.532     HgA1c:   Lab Results   Component Value Date    LABA1C 5.6 05/09/2022     FASTING LIPID PANEL:  Lab Results   Component Value Date    CHOL 126 05/09/2022    HDL 68 05/09/2022    LDLCALC 43 05/09/2022    TRIG 75 05/09/2022     LIVER PROFILE:  Recent Labs     05/09/22  0238   AST 15   ALT 8   LABALBU 4.1   Results for Anushka Astudillo (MRN [id number]) as of 5/10/2022 15:08   Ref. Range 5/9/2022 05:14 5/9/2022 05:57 5/9/2022 09:05 5/9/2022 17:40 5/10/2022 08:19   Troponin, High Sensitivity Latest Ref Range: 0 - 9 ng/L 10 (H)  9 15 (H)        CXR 5/9/2022  Impression   No acute findings. Chest CTA 5/9/2022  Impression   No evidence of pulmonary embolism or acute pulmonary abnormality.       Cardiomegaly. Abdomen/Pelvis CT 5/9/2022  Impression   Diverticulosis of the colon.       A fat containing supraumbilical hernia. Assessment and plan to follow as per Dr. Yayo Spears. NOTE: This report was transcribed using voice recognition software. Every effort was made to ensure accuracy; however, inadvertent computerized transcription errors may be present. Ashwin Ponce, [address] [place] [address] Cardiology    Electronically signed by Chadd Pompa PA-C on 5/10/2022 at 7:48 AM       Addendum:    Fernando Corbett MD     Reason for consult: CHF     Patient previously not known to [address] [place] [address] Cardiology      History of Present illness:  62year old with history of HTN, Non-morbid obesity presented to [facility] ED 5/9/2022 for abdominal pain and shortness of breath.  Cardiology was consulted for chest pain, shortness of breath, CHF. She is having SOBOE for few days, No PND or orthopnea. No palpitations. Intermittent chest pain, mid sternal, worse with walking especially up steps and made better with rest.  pain is like tightness, no radiation, associated with some nausea. Relieve on its own. No fever or chills. Compliant with her medications. BP runs high at home.  She is also complaining of lower abdominal pain and was found to have a UTI which she is being treated for with an antibiotic.     Labs showed Potassium 3.5, BUN 13, creatinine 0.9, lactic acid 2.6> 2.0, glucose 177, proBNP 1421,HScTNT 9> 10> 9> 18, alk phos 113, ethanol 78, WBC 2.6, H&H 12.4/37.8, platelet 973, D-dimer 641, COVID-19 not detected. CXR: No acute process.  CTA pulmonary: Already on medically.  No evidence of PE. CT abdomen with contrast: Diverticulosis.  Fat-containing supra umbilical hernia. Upon assessment today 5/10/2022 patient is asleep lying supine in bed.  Patient was awoken and is alert and oriented, speaking full sentences, and appears in no distress.       Review of systems:  Review of 10 systems negative except as mentioned in the HPI     PAST MEDICAL HISTORY:    Hypertension   Rheumatoid Arthritis  Non-morbid obesity      Past Surgical History:  Breast enhancement surgery  Dilation encourage of uterus  Hernia repair, tonsillectomy     HOME MEDICATIONS:  Home Medications                 Prior to Admission medications    Medication Sig Start Date End Date Taking?  Authorizing Provider   metoprolol succinate (TOPROL XL) 100 MG extended release tablet Take 100 mg by mouth daily     Yes Historical Provider, MD   meloxicam (MOBIC) 15 MG tablet Take 15 mg by mouth daily     Yes Historical Provider, MD   fluticasone (FLONASE) 50 MCG/ACT nasal spray 1 spray by Nasal route daily as needed for Rhinitis     Yes Historical Provider, MD             ALLERGIES:  Penicillins and Penicillins     SOCIAL HISTORY:    Housing:  Tobacco:  EtOH: Drinks wine daily  Drugs:  Oxygen:  Ambulation:     FAMILY HISTORY:   Mother: CHF     Scheduled Meds:  Scheduled Medications    [START ON 5/11/2022] metoprolol succinate  50 mg Oral Daily    amLODIPine  5 mg Oral Daily    sodium chloride flush  5-40 mL IntraVENous 2 times per day    bumetanide  0.5 mg IntraVENous Daily    ciprofloxacin  500 mg Oral Daily    docusate sodium  100 mg Oral Nightly    albuterol  2.5 mg Nebulization 4x daily    pantoprazole  40 mg Oral QAM AC    enoxaparin  40 mg SubCUTAneous Daily         Continuous Infusions:  Infusions Meds    sodium chloride           PRN Meds:. PRN Medications   [START ON 5/11/2022] regadenoson, sodium chloride flush, sodium chloride, ondansetron **OR** ondansetron, acetaminophen **OR** acetaminophen, perflutren lipid microspheres, potassium chloride **OR** potassium alternative oral replacement **OR** potassium chloride, senna, albuterol           Labs/imaging studies: Reviewed.     Our INDER exam, assessment reviewed and reflect my work. I personally saw, examined, and evaluated the patient today. I spent more than 50% of total consult time today for her evaluation, exam, and Diagnosis/recommendations. I personally reviewed the medications, rhythm strips, pertinent labs and test reports. I directly participated in the medical-decision making, ordering pertinent tests and medication adjustment.     Physical exam:          Vitals:     05/10/22 1220   BP: 132/74   Pulse: 58   Resp: 18   Temp: 96.4 °F (35.8 °C)   SpO2: 95%         In general, this is a well developed, well nourished who appears stated age. awake, alert, cooperative, no apparent distress     HEENT: eyes -conjunctivae pink,   Neck-  no stridor, no carotid bruit. no jugular venous distention   RESPIRATORY: Chest symmetrical and non-tender to palpation. No accessory muscles use. Lung auscultation - few rhonchi  CARDIOVASCULAR:     Heart Palpation - no palpable thrills  Heart Ausculation - Regular rate and rhythm, 8-9/4 systolic murmur, No s3 or rub. No lower extremity edema, Distal pulses palpable, no cyanosis   ABDOMEN: Soft, nontender,  Bowel sounds present. MS: n/a. : Deferred  Rectal Exam: Deferred  SKIN: warm and dry  NEURO / PSYCH: oriented to person, place           Impression/Recommendations:     Acute Heart Failure - Likely diastolic, pending her Echo. Continue diuretics-Monitor daily Wts, I/Os, BP, renal Fn     CP - No recurrence, Troponins flat; EKG reviewed-ST/T chagnes ischemia vs. LVH; Had stress test 10-15 years ago.  Stress test tomorrow     Sinus bradycardia - Asymptomatic, decrease Metoprolol, Monitor HR, TSH OK     Essential HTN - Add Amlodipine and monitor BP     Alcohol use - Counseled to quit drinking      Hx of Rheumatoid Arthritis - Per Dr Jovi Johnston     Non-morbid obesity - Lost 20 lbs in few months, Diet, exercise as tolerates and continued weight loss discussed.                    Tentative discahrge tomorrow, if stress test OK     Above d/w her and her , and all questions answered.        Thank you for the consult.           Ela Ramírez MD  5/10/2022  12:27 PM  [facility]) Cardiology

## 2022-05-11 ENCOUNTER — APPOINTMENT (OUTPATIENT)
Dept: NON INVASIVE DIAGNOSTICS | Age: 62
End: 2022-05-11
Payer: COMMERCIAL

## 2022-05-11 ENCOUNTER — APPOINTMENT (OUTPATIENT)
Dept: NUCLEAR MEDICINE | Age: 62
End: 2022-05-11
Payer: COMMERCIAL

## 2022-05-11 LAB
ANION GAP SERPL CALCULATED.3IONS-SCNC: 9 MMOL/L (ref 7–16)
BUN BLDV-MCNC: 18 MG/DL (ref 6–23)
CALCIUM SERPL-MCNC: 9.4 MG/DL (ref 8.6–10.2)
CHLORIDE BLD-SCNC: 102 MMOL/L (ref 98–107)
CO2: 29 MMOL/L (ref 22–29)
CREAT SERPL-MCNC: 0.9 MG/DL (ref 0.5–1)
EKG ATRIAL RATE: 57 BPM
EKG P AXIS: 22 DEGREES
EKG P-R INTERVAL: 230 MS
EKG Q-T INTERVAL: 446 MS
EKG QRS DURATION: 90 MS
EKG QTC CALCULATION (BAZETT): 434 MS
EKG R AXIS: -26 DEGREES
EKG T AXIS: 113 DEGREES
EKG VENTRICULAR RATE: 57 BPM
GFR AFRICAN AMERICAN: >60
GFR NON-AFRICAN AMERICAN: >60 ML/MIN/1.73
GLUCOSE BLD-MCNC: 88 MG/DL (ref 74–99)
LV EF: 30 %
LV EF: 30 %
LVEF MODALITY: NORMAL
LVEF MODALITY: NORMAL
MAGNESIUM: 2.2 MG/DL (ref 1.6–2.6)
POTASSIUM SERPL-SCNC: 4 MMOL/L (ref 3.5–5)
SODIUM BLD-SCNC: 140 MMOL/L (ref 132–146)

## 2022-05-11 PROCEDURE — 6360000002 HC RX W HCPCS: Performed by: INTERNAL MEDICINE

## 2022-05-11 PROCEDURE — 6370000000 HC RX 637 (ALT 250 FOR IP): Performed by: INTERNAL MEDICINE

## 2022-05-11 PROCEDURE — 96372 THER/PROPH/DIAG INJ SC/IM: CPT

## 2022-05-11 PROCEDURE — 93308 TTE F-UP OR LMTD: CPT

## 2022-05-11 PROCEDURE — 6360000002 HC RX W HCPCS: Performed by: PHYSICIAN ASSISTANT

## 2022-05-11 PROCEDURE — 78452 HT MUSCLE IMAGE SPECT MULT: CPT | Performed by: INTERNAL MEDICINE

## 2022-05-11 PROCEDURE — 93017 CV STRESS TEST TRACING ONLY: CPT

## 2022-05-11 PROCEDURE — 83735 ASSAY OF MAGNESIUM: CPT

## 2022-05-11 PROCEDURE — 36415 COLL VENOUS BLD VENIPUNCTURE: CPT

## 2022-05-11 PROCEDURE — 2060000000 HC ICU INTERMEDIATE R&B

## 2022-05-11 PROCEDURE — 2580000003 HC RX 258: Performed by: NURSE PRACTITIONER

## 2022-05-11 PROCEDURE — A9500 TC99M SESTAMIBI: HCPCS | Performed by: RADIOLOGY

## 2022-05-11 PROCEDURE — 99233 SBSQ HOSP IP/OBS HIGH 50: CPT | Performed by: INTERNAL MEDICINE

## 2022-05-11 PROCEDURE — 93016 CV STRESS TEST SUPVJ ONLY: CPT | Performed by: INTERNAL MEDICINE

## 2022-05-11 PROCEDURE — 94640 AIRWAY INHALATION TREATMENT: CPT

## 2022-05-11 PROCEDURE — 3430000000 HC RX DIAGNOSTIC RADIOPHARMACEUTICAL: Performed by: RADIOLOGY

## 2022-05-11 PROCEDURE — 93010 ELECTROCARDIOGRAM REPORT: CPT | Performed by: INTERNAL MEDICINE

## 2022-05-11 PROCEDURE — 6370000000 HC RX 637 (ALT 250 FOR IP): Performed by: PHYSICIAN ASSISTANT

## 2022-05-11 PROCEDURE — 96376 TX/PRO/DX INJ SAME DRUG ADON: CPT

## 2022-05-11 PROCEDURE — 78452 HT MUSCLE IMAGE SPECT MULT: CPT

## 2022-05-11 PROCEDURE — 2500000003 HC RX 250 WO HCPCS: Performed by: NURSE PRACTITIONER

## 2022-05-11 PROCEDURE — 93018 CV STRESS TEST I&R ONLY: CPT | Performed by: INTERNAL MEDICINE

## 2022-05-11 PROCEDURE — G0378 HOSPITAL OBSERVATION PER HR: HCPCS

## 2022-05-11 PROCEDURE — 80048 BASIC METABOLIC PNL TOTAL CA: CPT

## 2022-05-11 PROCEDURE — 6370000000 HC RX 637 (ALT 250 FOR IP): Performed by: NURSE PRACTITIONER

## 2022-05-11 RX ORDER — FUROSEMIDE 40 MG/1
40 TABLET ORAL DAILY
Status: DISCONTINUED | OUTPATIENT
Start: 2022-05-12 | End: 2022-05-13 | Stop reason: HOSPADM

## 2022-05-11 RX ADMIN — SODIUM CHLORIDE, PRESERVATIVE FREE 10 ML: 5 INJECTION INTRAVENOUS at 12:32

## 2022-05-11 RX ADMIN — Medication 12 MILLICURIE: at 09:32

## 2022-05-11 RX ADMIN — SACUBITRIL AND VALSARTAN 1 TABLET: 24; 26 TABLET, FILM COATED ORAL at 22:06

## 2022-05-11 RX ADMIN — CIPROFLOXACIN 500 MG: 500 TABLET, FILM COATED ORAL at 12:31

## 2022-05-11 RX ADMIN — REGADENOSON 0.4 MG: 0.08 INJECTION, SOLUTION INTRAVENOUS at 10:45

## 2022-05-11 RX ADMIN — SODIUM CHLORIDE, PRESERVATIVE FREE 10 ML: 5 INJECTION INTRAVENOUS at 22:05

## 2022-05-11 RX ADMIN — ALBUTEROL SULFATE 2.5 MG: 2.5 SOLUTION RESPIRATORY (INHALATION) at 08:49

## 2022-05-11 RX ADMIN — AMLODIPINE BESYLATE 5 MG: 5 TABLET ORAL at 09:13

## 2022-05-11 RX ADMIN — ALBUTEROL SULFATE 2.5 MG: 2.5 SOLUTION RESPIRATORY (INHALATION) at 13:07

## 2022-05-11 RX ADMIN — PANTOPRAZOLE SODIUM 40 MG: 40 TABLET, DELAYED RELEASE ORAL at 06:03

## 2022-05-11 RX ADMIN — BUMETANIDE 0.5 MG: 0.25 INJECTION INTRAMUSCULAR; INTRAVENOUS at 12:32

## 2022-05-11 RX ADMIN — SACUBITRIL AND VALSARTAN 1 TABLET: 24; 26 TABLET, FILM COATED ORAL at 16:17

## 2022-05-11 RX ADMIN — ENOXAPARIN SODIUM 40 MG: 100 INJECTION SUBCUTANEOUS at 12:31

## 2022-05-11 RX ADMIN — ALBUTEROL SULFATE 2.5 MG: 2.5 SOLUTION RESPIRATORY (INHALATION) at 19:48

## 2022-05-11 RX ADMIN — ALBUTEROL SULFATE 2.5 MG: 2.5 SOLUTION RESPIRATORY (INHALATION) at 16:13

## 2022-05-11 RX ADMIN — METOPROLOL SUCCINATE 50 MG: 50 TABLET, EXTENDED RELEASE ORAL at 09:13

## 2022-05-11 ASSESSMENT — PAIN SCALES - GENERAL
PAINLEVEL_OUTOF10: 0

## 2022-05-11 NOTE — PLAN OF CARE
Problem: Discharge Planning  Goal: Discharge to home or other facility with appropriate resources  Recent Flowsheet Documentation  Taken 5/11/2022 0909 by Priya Espinosa RN  Discharge to home or other facility with appropriate resources: Identify barriers to discharge with patient and caregiver  5/10/2022 2323 by Luke Mathews RN  Outcome: Progressing     Problem: Pain  Goal: Verbalizes/displays adequate comfort level or baseline comfort level  5/11/2022 1118 by Priya Espinosa RN  Outcome: Progressing  Flowsheets (Taken 5/11/2022 0909)  Verbalizes/displays adequate comfort level or baseline comfort level: Assess pain using appropriate pain scale  5/10/2022 2323 by Luke Mathews RN  Outcome: Progressing     Problem: Safety - Adult  Goal: Free from fall injury  5/11/2022 1118 by Priya Espinosa RN  Outcome: Progressing  5/10/2022 2323 by Luke Mathews RN  Outcome: Progressing     Problem: Cardiovascular - Adult  Goal: Maintains optimal cardiac output and hemodynamic stability  Recent Flowsheet Documentation  Taken 5/11/2022 0909 by Priya Espinosa RN  Maintains optimal cardiac output and hemodynamic stability: Monitor blood pressure and heart rate  5/10/2022 2323 by Luke Mathews RN  Outcome: Progressing     Problem: Metabolic/Fluid and Electrolytes - Adult  Goal: Hemodynamic stability and optimal renal function maintained  Recent Flowsheet Documentation  Taken 5/11/2022 0909 by Priya Espinosa RN  Hemodynamic stability and optimal renal function maintained: Monitor labs and assess for signs and symptoms of volume excess or deficit  5/10/2022 2323 by Luke Mathews RN  Outcome: Progressing     Problem: Chronic Conditions and Co-morbidities  Goal: Patient's chronic conditions and co-morbidity symptoms are monitored and maintained or improved  Recent Flowsheet Documentation  Taken 5/11/2022 0909 by Wilton Dickens 34 - Patient's Chronic Conditions and Co-Morbidity Symptoms are Monitored and Maintained or Improved: Monitor and assess patient's chronic conditions and comorbid symptoms for stability, deterioration, or improvement  5/10/2022 2323 by Chalo Demarco RN  Outcome: Progressing

## 2022-05-11 NOTE — CONSULTS
Patient currently admitted with diagnosis of new onset heart failure. Patient was alert and oriented during the consultation. She was engaged and asked appropriate questions throughout the education session. She is agreeable to self monitoring, following a low sodium diet, and calling to schedule a same day appointment with the CHF clinic as needed. Scheduling with the CHF clinic, Cardiology APRN, and PCP (5/16/22 @ 11:20) Yes. Patient states she is going on vacation 6/2-6/6, appointments scheduled to accommodate dates. No future appointments. We reviewed the introduction to Heart Failure, the HF zones, signs and symptoms to report on day 1 of onset, medications, medication compliance, the importance of obtaining daily weights, following a low sodium diet, reading food labels for the sodium content, keeping physician appointments, and smoking cessation. We discussed writing / tracking daily weights on a calendar / log because a 5 pound gain in 1 week can sneak up if you are not tracking it. Contributing risk factors for Heart Failure are identified as new chronic disease management routine. I advised patient they can reduce the risk for Heart Failure exacerbations by modifying / controlling the risk factors. We discussed self-managed care which includes the following:  to take medications as prescribed, report any intolerable side effects of medications to the cardiologist / doctor, do not just stop taking the medication; follow a cardiac heart healthy / low sodium diet; weigh yourself daily, exercise regularly- per doctor recommendation and not to smoke or use an excess amount of alcohol. We discussed calling the cardiologist / doctor with a weight gain of 3 pounds in one day or a total of 5 pounds or more in one week. Also, if you should have a significant weight loss of 3# or more in one day to call the doctor, they may need to decrease or hold the diuretic dose.  On days you feels nauseated and not eating / drinking, having emesis or diarrhea,  informed to call the cardiologist  / doctor, they may need to decrease or hold diuretic to avoid dehydration. I stressed the importance of informing their cardiologist the first day of onset of any of the signs and symptoms in the \"Yellow Zone\" of the HF Zones. Patient verbalizes understanding. Greater than 30 minutes was spent educating patient. The Heart Failure Booklet given to the patient with additional patient education addressing:  · What is Heart Failure? · Things You Can Do to Live Well with HF  · How to Take Your Medications  · How to Eat Less Salt  · Shirley its Salt? · Exercising Well with Heart Failure  · Signs and symptoms of HF to report  · Weight Yourself Each Day  · Home Self Management- activity, weight tracking, taking medications as prescribed, meals /diet planning (sodium and fluid restriction), how to read food labels, keeping physician follow ups, smoking cessation, follow the Heart Failure Zones  · The Heart Failure zones  · Every Dose Every Day      Instructed  to call 911 if you have any of the following symptoms:    ·    Struggling to breathe unrelieved with rest,  ·    Having chest pain  ·    Have confusion or cant think clearly      The patient is ordered:  Diet: ADULT DIET; Regular;  Low Sodium (2 gm)   Sodium controlled diet Yes  Fluid restriction daily ordered (fluid restriction recommended if patient is hyponatremic and/or diuretic is initiated or increased) No  FR:   Daily Weights: Patient Vitals for the past 96 hrs (Last 3 readings):   Weight   05/11/22 0600 210 lb 6.4 oz (95.4 kg)   05/10/22 0525 210 lb 8 oz (95.5 kg)   05/09/22 0225 210 lb (95.3 kg)     I/O:     Intake/Output Summary (Last 24 hours) at 5/11/2022 1403  Last data filed at 5/10/2022 2319  Gross per 24 hour   Intake 120 ml   Output --   Net 120 ml            Roger Luna RN BSN, RN  Heart Failure [address] [place] [address],[address] (CHF) AHA GUIDELINES  (Must be completed for Primary Diagnosis CHF or History of CHF)    Discharge Plan:  I placed the Heart Failure Home Instructions in patient's discharge instructions. Per Heart Failure [facility], the patient should have a follow-up appointment made within 7 days of discharge. New Diagnosis Yes    ECHO Results most recent:  No results found for: LVEF, LVEFMODE                                     Social History     Tobacco Use   Smoking Status Never Smoker   Smokeless Tobacco Never Used        There is no immunization history for the selected administration types on file for this patient. Angiotensin-Converting-Enzyme (ACE) inhibitor ordered:  [] Yes  [x] No (specify contraindication): Allergy    [] Contraindicated  [] Hypotensive patient who was at immediate risk of cardiogenic shock  [] Hospitalized patient who experienced marked azotemia  [] Other Contraindications  [] Not Eligible  [] Not Tolerant  [] Patient Reason  [] System Reason  [] Other Reason    Angiotensin II receptor blockers (ARB) ordered:  [] Yes  [x] No (specify contraindication):  Allergy    [] Contraindicated  [] Hypotensive patient who was at immediate risk of cardiogenic shock  [] Hospitalized patient who experienced marked azotemia  [] Other Contraindications    ARNI - Angiotensin Receptor Neprilysin Inhibitor ordered:  [] Yes  [x] No (specify contraindication):    [] ACE inhibitor use within the prior 36 hours  [] Allergy  [] Hyperkalemia  [] Hypotension  [] Renal dysfunction defined as creatinine > 2.5 mg/dL in men or > 2.0 mg/dL in women  [] Other Contraindications  [] Not Eligible  [] Not Tolerant  [] Patient Reason  []System Reason  []Other Reason      Beta Blocker (Carvedilol, Metoprolol Succinate, or Bisoprolol) ordered:    [x]  Yes Toprol XL  [] No (specify contraindication):    [] Contraindicated  [] Asthma  [] Fluid Overload  [] Low Blood Pressure  [] Patient recently treated with an intravenous positive inotropic agent  [] Other Contraindications  [] Not Eligible  [] Not Tolerant  [] Patient Reason  [] System Reason    SGLT2 Inhibitor ordered:  [] Yes  [x] No (specify contraindication):    [] Contraindicated  [] Patient currently on dialysis  [] Ketoacidosis  [] Known hypersensitivity to the medication  [] Type I diabetes (not approved for use in patients with Type I diabetes due to increased risk of ketoacidosis)  [] Other Contraindications  [] Not Eligible  [] Not Tolerant  [] Patient Reason  [x] System Reason  [] Other Reason    Aldosterone Antagonist ordered:  [] Yes  [x] No (specify contraindication):    [] Contraindicated  [] Allergy due to aldosterone receptor antagonist  [] Hyperkalemia  [] Renal dysfunction defined as creatinine >2.5 mg/dL in men or >2.0 mg/dL in women.   [] Other contraindications  [x] Not Eligible  [] Not Tolerant  [] Patient Reason  [] System Reason  [] Other Reason

## 2022-05-11 NOTE — PROGRESS NOTES
Hospitalist Progress Note      PCP: Shannan Piper MD    Date of Admission: 5/9/2022        Hospital Course:  **61 y.o. female presented with  SOB AND CHEST PAIN, , TIGHTNESS IN CHARACTER, LOCATED ACW, NON RADIATING, CONTINUOUS WITH NAUSEA, DIAPHORESIS AND PALPITATIONS . SHE HAS A KNOWN HISTORY OF RA, HTN, AND RECENTLY TREATED FOR UTI.  WAS ON A WEIGHT LOSS DRUG YEARS AGO THAT WAS TIED TO  HEART MURMURS** HAD A STRESS, AWAITING ECHO, FOUND TO HAVE A CARDIOMYOPATHY. STARTED ON ENTRESTO, NORVASC STOPPED.    *        Subjective: * ABD PAIN          Medications:  Reviewed    Infusion Medications    sodium chloride       Scheduled Medications    sacubitril-valsartan  1 tablet Oral BID    [START ON 5/12/2022] furosemide  40 mg Oral Daily    metoprolol succinate  50 mg Oral Daily    sodium chloride flush  5-40 mL IntraVENous 2 times per day    docusate sodium  100 mg Oral Nightly    albuterol  2.5 mg Nebulization 4x daily    pantoprazole  40 mg Oral QAM AC    enoxaparin  40 mg SubCUTAneous Daily     PRN Meds: technetium sestamibi, sodium chloride flush, sodium chloride, ondansetron **OR** ondansetron, acetaminophen **OR** acetaminophen, perflutren lipid microspheres, potassium chloride **OR** potassium alternative oral replacement **OR** potassium chloride, senna, albuterol      Intake/Output Summary (Last 24 hours) at 5/11/2022 1538  Last data filed at 5/10/2022 2319  Gross per 24 hour   Intake 120 ml   Output --   Net 120 ml       Exam:    /77   Pulse 60   Temp 96.7 °F (35.9 °C) (Oral)   Resp 16   Ht 5' 8\" (1.727 m)   Wt 210 lb 6.4 oz (95.4 kg)   SpO2 97%   BMI 31.99 kg/m²       General appearance:  No apparent distress,   HEENT:  Normal cephalic, atraumatic without obvious deformity. Neck: Supple, with full range of motion. n. Trachea midline. Respiratory:  Normal respiratory effort. Clear to auscultation, bilaterally without Rales/Wheezes/Rhonchi.   Cardiovascular:  Regular rate and rhythm Abdomen: Soft, non-tender, non-distended with normal bowel sounds. Musculoskeletal:  No clubbing, cyanosis or edema bilaterally.    Skin: Skin color, texture, turgor normal.  No rashes or lesions. Neurologic:  Neurovascularly intact without any focal sensory/motor deficits. Cranial nerves: II-XII intact, grossly non-focal.  Psychiatric:  Alert and oriented, thought content appropriate, normal insight          Labs:   Recent Labs     05/09/22  0238 05/10/22  0819   WBC 2.6* 2.4*   HGB 12.4 13.0   HCT 37.8 40.9    164     Recent Labs     05/09/22  0238 05/10/22  0819 05/11/22  0732    145 140   K 3.5 4.4 4.0    106 102   CO2 22 24 29   BUN 13 15 18   CREATININE 0.9 1.1* 0.9   CALCIUM 8.8 9.1 9.4     Recent Labs     05/09/22  0238   AST 15   ALT 8   BILITOT 0.4   ALKPHOS 113*     No results for input(s): INR in the last 72 hours. No results for input(s): Winford Faulkner in the last 72 hours. Recent Labs     05/09/22  0238   AST 15   ALT 8   BILITOT 0.4   ALKPHOS 113*     Recent Labs     05/09/22  0238 05/09/22  0514   LACTA 2.6* 2.0     No results found for: Genet Jubilee  No results found for: AMMONIA    Assessment:  ACUTE on chronic diastolic heart failure    HTN   RHEUMATOID ARTHRITIS   HEART MURMUR (TIED TO WEIGHT LOSS DRUG YEARS AGO)  RECENT UTI  ICM      Plan:  *ECHO PENDING  ENTRESTO  STOP NORVASC   ? LIFE VEST    DVT Prophylaxis: *LOVENOX  Diet: ADULT DIET; Regular;  Low Sodium (2 gm)  Code Status: Full Code     PT/OT Eval Status: LOVENOX     Dispo -[facility]       Electronically signed by Anali Hahn DO on 5/11/2022 at 3:38 PM [facility]

## 2022-05-11 NOTE — PROGRESS NOTES
Lexiscan Stress Test:    Reason for study: Chest pain    Resting EKG showed Sinus rhythm, lateral  Nonspecific ST/T chagnes  Exam:  Heart - regular, Lungs- clear    Patient received 0.4mg Lexiscan per protocol    Symptoms: No chest pain, mild short of breath-resolved in recovery    EKG:  No new ischemia; Oc PVCs and PACs uring Lexiscan infusion. Maximal heart rate 93         Peak /92  mmHg       Post test complications: None      SPECT image report pending.     Electronically signed by Jason Fink MD on 5/11/2022 at 10:53 AM

## 2022-05-11 NOTE — PROGRESS NOTES
[facility]        CARDIOLOGY                 INPATIENT PROGRESS NOTE          PATIENT SEEN IN FOLLOW UP FOR: CHF, CP    Hospital Day: 3     Velasquez Pemberton is a 62year old patient not known to [facility] cardiology. SUBJECTIVE: Denies any CP, Breathing better, No orthopnea. No palpitations or dizzy. Seen in stress lab area    ROS: Review of rest of 10 systems negative except as mentioned above    OBJECTIVE: No acute distress. See Assessment     Diagnostics:       Telemetry: Reviewed        Intake/Output Summary (Last 24 hours) at 5/11/2022 1053  Last data filed at 5/10/2022 2319  Gross per 24 hour   Intake 120 ml   Output --   Net 120 ml       Labs:   CBC:   Recent Labs     05/09/22  0238 05/10/22  0819   WBC 2.6* 2.4*   HGB 12.4 13.0   HCT 37.8 40.9    164     BMP:   Recent Labs     05/10/22  0819 05/11/22  0732    140   K 4.4 4.0   CO2 24 29   BUN 15 18   CREATININE 1.1* 0.9   LABGLOM >60 >60   CALCIUM 9.1 9.4     Mag:   Recent Labs     05/10/22  0819 05/11/22  0732   MG 2.0 2.2     Phos: No results for input(s): PHOS in the last 72 hours. TSH:   Recent Labs     05/09/22  0905   TSH 0.532     HgA1c:     BNP: No results for input(s): BNP in the last 72 hours. PT/INR: No results for input(s): PROTIME, INR in the last 72 hours. APTT:No results for input(s): APTT in the last 72 hours. CARDIAC ENZYMES:No results for input(s): CKTOTAL, CKMB, CKMBINDEX, TROPONINI in the last 72 hours.   FASTING LIPID PANEL:  Lab Results   Component Value Date    CHOL 126 05/09/2022    HDL 68 05/09/2022    LDLCALC 43 05/09/2022    TRIG 75 05/09/2022     LIVER PROFILE:  Recent Labs     05/09/22  0238   AST 15   ALT 8   LABALBU 4.1       Current Inpatient Medications:   metoprolol succinate  50 mg Oral Daily    amLODIPine  5 mg Oral Daily    sodium chloride flush  5-40 mL IntraVENous 2 times per day    bumetanide  0.5 mg IntraVENous Daily    ciprofloxacin  500 mg Oral Daily    docusate sodium  100 mg Oral Nightly    albuterol  2.5 mg Nebulization 4x daily    pantoprazole  40 mg Oral QAM AC    enoxaparin  40 mg SubCUTAneous Daily       IV Infusions (if any):   sodium chloride           PHYSICAL EXAM:     CONSTITUTIONAL:   /71   Pulse 66   Temp 96.9 °F (36.1 °C) (Temporal)   Resp 16   Ht 5' 8\" (1.727 m)   Wt 210 lb 6.4 oz (95.4 kg)   SpO2 98%   BMI 31.99 kg/m²   Pulse  Av.2  Min: 62  Max: 66  Systolic (02GUP), LOS:764 , Min:132 , KOW:630    Diastolic (25QXK), WNV:45, Min:71, Max:85      In general, this is a well developed, well nourished who appears stated age. awake, alert, no apparent distress     HEENT: eyes -conjunctivae pink,   Neck-  no stridor, no carotid bruit. no jugular venous distention   RESPIRATORY: Chest symmetrical and non-tender to palpation.  No accessory muscles use.   Lung auscultation - few rhonchi  CARDIOVASCULAR:     Heart Palpation - no palpable thrills  Heart Ausculation - Regular rate and TUSXVG, 6-7/4 systolic murmur, No s3 or rub. No lower extremity edema, Distal pulses palpable, no cyanosis   ABDOMEN: Soft, nontender,  Bowel sounds present. MS: n/a.   : Deferred  Rectal Exam: Deferred  SKIN: warm and dry  NEURO / PSYCH: oriented to person, place           Impression/Recommendations:     Acute Heart Failure - Likely diastolic, pending her Echo.  Change to PO diuretics-Monitor daily Wts, I/Os, BP, renal Fn - Cr 0.9     CP - No recurrence, Troponins flat; EKG reviewed-ST/T chagnes ischemia vs. LVH; Had stress test 10-15 years ago. Stress test today     Sinus bradycardia - Asymptomatic, Metoprolol decreased; Monitor HR, TSH OK     Essential HTN - Amlodipine added, BP controlled.      Acute cystitis without hematuria    Alcohol use - Counseled to quit drinking      Hx of Rheumatoid Arthritis - Per Dr Alicia Carty     Non-morbid obesity - Lost 20 lbs in few months, Diet, exercise as tolerates and continued weight loss discussed.                   If stress test normal home later today. Above recommendations discussed with her.         Electronically signed by Maury Harris MD on 5/11/2022 at 10:53 [facility]        Addendum:    Stress test showed dilated LV with EF 30%    Diagnosis - Dilated Cardiomyopathy    Electronically signed by Maury Harris MD on 5/11/2022 at 1:42 PM

## 2022-05-11 NOTE — PROGRESS NOTES
Stress test results discussed her. Management plan (Meds, up-titration, low salt diet, Wts monitoring) for her new Cardiomyopathy discussed  Start Entresto, discontinue Norvasc. Monitor renal Fn  Benefits of Life Vest due to risk of her SCD discussed; agreeable for Life Vest upon discahrge pending her Echo findings.     CHF already education given    Above d/w Dr Sidney Pope MD  5/11/2022  2:42 PM

## 2022-05-11 NOTE — CARE COORDINATION
Care Coordination  The patient did well in therapy her [facility] was  And she walked a total of 160 feet with no ad. Her plan is to return home once she is medically stable. The patient has declined home care. Her ride home is a family/friend and she will call them once she is medically stable. Stress test done shows no new ischemia. The patient is on room air.

## 2022-05-11 NOTE — PLAN OF CARE
Problem: Discharge Planning  Goal: Discharge to home or other facility with appropriate resources  5/10/2022 2323 by Dewayne Hernandez RN  Outcome: Progressing  5/10/2022 1257 by Trang Galloway RN  Outcome: Progressing     Problem: Pain  Goal: Verbalizes/displays adequate comfort level or baseline comfort level  5/10/2022 2323 by Dewayne Hernandez RN  Outcome: Progressing  5/10/2022 1257 by Trang Galloway RN  Outcome: Progressing     Problem: Cardiovascular - Adult  Goal: Maintains optimal cardiac output and hemodynamic stability  5/10/2022 2323 by Dewayne Hernandez RN  Outcome: Progressing  5/10/2022 1257 by Trang Galloway RN  Outcome: Progressing     Problem: Metabolic/Fluid and Electrolytes - Adult  Goal: Hemodynamic stability and optimal renal function maintained  5/10/2022 2323 by Dewayne Hernandez RN  Outcome: Progressing  5/10/2022 1257 by Trang Galloway RN  Outcome: Progressing     Problem: Chronic Conditions and Co-morbidities  Goal: Patient's chronic conditions and co-morbidity symptoms are monitored and maintained or improved  5/10/2022 2323 by Dewayne Hernandez RN  Outcome: Progressing  5/10/2022 1257 by Trang Galloway RN  Outcome: Progressing

## 2022-05-12 PROBLEM — I50.9 HEART FAILURE (HCC): Status: ACTIVE | Noted: 2022-05-12

## 2022-05-12 LAB
ANION GAP SERPL CALCULATED.3IONS-SCNC: 14 MMOL/L (ref 7–16)
BUN BLDV-MCNC: 17 MG/DL (ref 6–23)
CALCIUM SERPL-MCNC: 9.6 MG/DL (ref 8.6–10.2)
CHLORIDE BLD-SCNC: 101 MMOL/L (ref 98–107)
CO2: 24 MMOL/L (ref 22–29)
CREAT SERPL-MCNC: 0.9 MG/DL (ref 0.5–1)
GFR AFRICAN AMERICAN: >60
GFR NON-AFRICAN AMERICAN: >60 ML/MIN/1.73
GLUCOSE BLD-MCNC: 157 MG/DL (ref 74–99)
MAGNESIUM: 2.1 MG/DL (ref 1.6–2.6)
POTASSIUM SERPL-SCNC: 4 MMOL/L (ref 3.5–5)
PRO-BNP: 683 PG/ML (ref 0–125)
SODIUM BLD-SCNC: 139 MMOL/L (ref 132–146)

## 2022-05-12 PROCEDURE — 2580000003 HC RX 258: Performed by: NURSE PRACTITIONER

## 2022-05-12 PROCEDURE — 83880 ASSAY OF NATRIURETIC PEPTIDE: CPT

## 2022-05-12 PROCEDURE — 6370000000 HC RX 637 (ALT 250 FOR IP): Performed by: PHYSICIAN ASSISTANT

## 2022-05-12 PROCEDURE — 94640 AIRWAY INHALATION TREATMENT: CPT

## 2022-05-12 PROCEDURE — 99214 OFFICE O/P EST MOD 30 MIN: CPT | Performed by: INTERNAL MEDICINE

## 2022-05-12 PROCEDURE — G0378 HOSPITAL OBSERVATION PER HR: HCPCS

## 2022-05-12 PROCEDURE — 6360000002 HC RX W HCPCS: Performed by: INTERNAL MEDICINE

## 2022-05-12 PROCEDURE — 83735 ASSAY OF MAGNESIUM: CPT

## 2022-05-12 PROCEDURE — 80048 BASIC METABOLIC PNL TOTAL CA: CPT

## 2022-05-12 PROCEDURE — 36415 COLL VENOUS BLD VENIPUNCTURE: CPT

## 2022-05-12 PROCEDURE — 6370000000 HC RX 637 (ALT 250 FOR IP): Performed by: INTERNAL MEDICINE

## 2022-05-12 PROCEDURE — 96372 THER/PROPH/DIAG INJ SC/IM: CPT

## 2022-05-12 RX ORDER — FUROSEMIDE 40 MG/1
40 TABLET ORAL DAILY
Qty: 30 TABLET | Refills: 1 | Status: SHIPPED | OUTPATIENT
Start: 2022-05-13 | End: 2022-06-10 | Stop reason: SDUPTHER

## 2022-05-12 RX ORDER — FUROSEMIDE 40 MG/1
40 TABLET ORAL DAILY
Qty: 30 TABLET | Refills: 1 | Status: SHIPPED | OUTPATIENT
Start: 2022-05-13 | End: 2022-05-12

## 2022-05-12 RX ORDER — METOPROLOL SUCCINATE 50 MG/1
50 TABLET, EXTENDED RELEASE ORAL DAILY
Qty: 30 TABLET | Refills: 3 | Status: SHIPPED | OUTPATIENT
Start: 2022-05-13 | End: 2022-10-07 | Stop reason: DRUGHIGH

## 2022-05-12 RX ORDER — METOPROLOL SUCCINATE 50 MG/1
50 TABLET, EXTENDED RELEASE ORAL DAILY
Qty: 30 TABLET | Refills: 3 | Status: SHIPPED | OUTPATIENT
Start: 2022-05-13 | End: 2022-05-12

## 2022-05-12 RX ORDER — POTASSIUM CHLORIDE 20 MEQ/1
20 TABLET, EXTENDED RELEASE ORAL DAILY
Qty: 30 TABLET | Refills: 0 | Status: SHIPPED | OUTPATIENT
Start: 2022-05-12 | End: 2022-06-02 | Stop reason: ALTCHOICE

## 2022-05-12 RX ORDER — POTASSIUM CHLORIDE 20 MEQ/1
20 TABLET, EXTENDED RELEASE ORAL DAILY
Qty: 30 TABLET | Refills: 0 | Status: SHIPPED | OUTPATIENT
Start: 2022-05-12 | End: 2022-05-12 | Stop reason: SDUPTHER

## 2022-05-12 RX ADMIN — METOPROLOL SUCCINATE 50 MG: 50 TABLET, EXTENDED RELEASE ORAL at 08:18

## 2022-05-12 RX ADMIN — SACUBITRIL AND VALSARTAN 1 TABLET: 24; 26 TABLET, FILM COATED ORAL at 21:12

## 2022-05-12 RX ADMIN — ALBUTEROL SULFATE 2.5 MG: 2.5 SOLUTION RESPIRATORY (INHALATION) at 09:07

## 2022-05-12 RX ADMIN — DOCUSATE SODIUM 100 MG: 100 CAPSULE, LIQUID FILLED ORAL at 21:12

## 2022-05-12 RX ADMIN — FUROSEMIDE 40 MG: 40 TABLET ORAL at 08:18

## 2022-05-12 RX ADMIN — PANTOPRAZOLE SODIUM 40 MG: 40 TABLET, DELAYED RELEASE ORAL at 06:33

## 2022-05-12 RX ADMIN — SACUBITRIL AND VALSARTAN 1 TABLET: 24; 26 TABLET, FILM COATED ORAL at 08:18

## 2022-05-12 RX ADMIN — ALBUTEROL SULFATE 2.5 MG: 2.5 SOLUTION RESPIRATORY (INHALATION) at 16:33

## 2022-05-12 RX ADMIN — ALBUTEROL SULFATE 2.5 MG: 2.5 SOLUTION RESPIRATORY (INHALATION) at 20:29

## 2022-05-12 RX ADMIN — SODIUM CHLORIDE, PRESERVATIVE FREE 10 ML: 5 INJECTION INTRAVENOUS at 08:18

## 2022-05-12 RX ADMIN — ENOXAPARIN SODIUM 40 MG: 100 INJECTION SUBCUTANEOUS at 08:18

## 2022-05-12 RX ADMIN — ALBUTEROL SULFATE 2.5 MG: 2.5 SOLUTION RESPIRATORY (INHALATION) at 13:07

## 2022-05-12 ASSESSMENT — PAIN SCALES - GENERAL
PAINLEVEL_OUTOF10: 0

## 2022-05-12 NOTE — PROGRESS NOTES
Hospitalist Progress Note      PCP: Hilda Smith MD    Date of Admission: 5/9/2022        Hospital Course:  *62 y.o. female presented with  SOB AND CHEST PAIN, , TIGHTNESS IN CHARACTER, LOCATED ACW, NON RADIATING, CONTINUOUS WITH NAUSEA, DIAPHORESIS AND PALPITATIONS . SHE HAS A KNOWN HISTORY OF RA, HTN, AND RECENTLY TREATED FOR UTI.  WAS ON A WEIGHT LOSS DRUG YEARS AGO THAT WAS TIED TO  HEART MURMURS** HAD A STRESS, AWAITING ECHO, FOUND TO HAVE A CARDIOMYOPATHY. STARTED ON ENTRESTO, NORVASC STOPPED.    * awaiting life vest**        Subjective: * no complaints          Medications:  Reviewed    Infusion Medications    sodium chloride       Scheduled Medications    sacubitril-valsartan  1 tablet Oral BID    furosemide  40 mg Oral Daily    metoprolol succinate  50 mg Oral Daily    sodium chloride flush  5-40 mL IntraVENous 2 times per day    docusate sodium  100 mg Oral Nightly    albuterol  2.5 mg Nebulization 4x daily    pantoprazole  40 mg Oral QAM AC    enoxaparin  40 mg SubCUTAneous Daily     PRN Meds: technetium sestamibi, sodium chloride flush, sodium chloride, ondansetron **OR** ondansetron, acetaminophen **OR** acetaminophen, perflutren lipid microspheres, potassium chloride **OR** potassium alternative oral replacement **OR** potassium chloride, senna, albuterol      Intake/Output Summary (Last 24 hours) at 5/12/2022 1647  Last data filed at 5/12/2022 1110  Gross per 24 hour   Intake 480 ml   Output --   Net 480 ml       Exam:    /75   Pulse 71   Temp 98.2 °F (36.8 °C) (Axillary)   Resp 16   Ht 5' 8\" (1.727 m)   Wt 203 lb (92.1 kg)   SpO2 98%   BMI 30.87 kg/m²       General appearance:  No apparent distress,   HEENT:  Normal cephalic, atraumatic without obvious deformity. Neck: Supple, with full range of motion. n. Trachea midline. Respiratory:  Normal respiratory effort.  Clear to auscultation,   Cardiovascular:  Regular rate and rhythm   Abdomen: Soft, non-tender, non-distended with normal bowel sounds. Musculoskeletal:  No clubbing, cyanosis or edema bilaterally.    Skin: Skin color, texture, turgor normal.  No rashes or lesions. Neurologic:  Neurovascularly intact without any focal sensory/motor deficits. Cranial nerves: II-XII intact, grossly non-focal.  Psychiatric:  Alert and oriented, thought content appropriate, normal insight               Labs:   Recent Labs     05/10/22  0819   WBC 2.4*   HGB 13.0   HCT 40.9        Recent Labs     05/10/22  0819 05/11/22  0732 05/12/22  0647    140 139   K 4.4 4.0 4.0    102 101   CO2 24 29 24   BUN 15 18 17   CREATININE 1.1* 0.9 0.9   CALCIUM 9.1 9.4 9.6     No results for input(s): AST, ALT, BILIDIR, BILITOT, ALKPHOS in the last 72 hours. No results for input(s): INR in the last 72 hours. No results for input(s): Eva Breeze in the last 72 hours. No results for input(s): AST, ALT, ALB, BILIDIR, BILITOT, ALKPHOS in the last 72 hours. No results for input(s): LACTA in the last 72 hours. No results found for: Cheri Cadet  No results found for: AMMONIA    Assessment:  ACUTE on chronic diastolic heart failure    HTN   RHEUMATOID ARTHRITIS   HEART MURMUR (TIED TO WEIGHT LOSS DRUG YEARS AGO)  RECENT UTI  ICM        Plan:  *ECHO PENDING  ENTRESTO  STOP NORVASC    LIFE VEST     DVT Prophylaxis: *LOVENOX  Diet: ADULT DIET; Regular;  Low Sodium (2 gm)  Code Status: Full Code     PT/OT Eval Status: LOVENOX     Dispo - [facility] signed by Charli Greene DO on 5/12/2022 at 4:47 PM [facility]

## 2022-05-12 NOTE — CARE COORDINATION
Order for [employer] noted, referral made to Rob Morrison at discharge pending insurance authorization. Discharge plan, return home, declined [facility], will call for her own ride.

## 2022-05-12 NOTE — PROGRESS NOTES
[facility]        CARDIOLOGY                 INPATIENT PROGRESS NOTE          PATIENT SEEN IN FOLLOW UP FOR: CHF, CP    Hospital Day: [address] is a 62year old patient not known to [facility] cardiology. SUBJECTIVE: Denies any CP, Breathing better, No orthopnea. No palpitations or dizzy. \"Tired\"    ROS: Review of rest of 10 systems negative except as mentioned above    OBJECTIVE: No acute distress. See Assessment     Diagnostics:       Telemetry: Reviewed  Echo 5/11/22 - EF 30%. Intake/Output Summary (Last 24 hours) at 5/12/2022 1312  Last data filed at 5/12/2022 1110  Gross per 24 hour   Intake 480 ml   Output --   Net 480 ml       Labs:   CBC:   Recent Labs     05/10/22  0819   WBC 2.4*   HGB 13.0   HCT 40.9        BMP:   Recent Labs     05/11/22  0732 05/12/22  0647    139   K 4.0 4.0   CO2 29 24   BUN 18 17   CREATININE 0.9 0.9   LABGLOM >60 >60   CALCIUM 9.4 9.6     Mag:   Recent Labs     05/11/22  0732 05/12/22  0647   MG 2.2 2.1     Phos: No results for input(s): PHOS in the last 72 hours. TSH:   No results for input(s): TSH in the last 72 hours. HgA1c:     BNP: No results for input(s): BNP in the last 72 hours. PT/INR: No results for input(s): PROTIME, INR in the last 72 hours. APTT:No results for input(s): APTT in the last 72 hours. CARDIAC ENZYMES:No results for input(s): CKTOTAL, CKMB, CKMBINDEX, TROPONINI in the last 72 hours. FASTING LIPID PANEL:  Lab Results   Component Value Date    CHOL 126 05/09/2022    HDL 68 05/09/2022    LDLCALC 43 05/09/2022    TRIG 75 05/09/2022     LIVER PROFILE:  No results for input(s): AST, ALT, LABALBU in the last 72 hours.     Current Inpatient Medications:   sacubitril-valsartan  1 tablet Oral BID    furosemide  40 mg Oral Daily    metoprolol succinate  50 mg Oral Daily    sodium chloride flush  5-40 mL IntraVENous 2 times per day    docusate sodium  100 mg Oral Nightly    albuterol  2.5 mg Nebulization 4x daily    pantoprazole  40 mg Oral QAM AC    enoxaparin  40 mg SubCUTAneous Daily       IV Infusions (if any):   sodium chloride           PHYSICAL EXAM:     CONSTITUTIONAL:   BP (!) 129/111   Pulse 61   Temp 97.4 °F (36.3 °C) (Temporal)   Resp 16   Ht 5' 8\" (1.727 m)   Wt 203 lb (92.1 kg)   SpO2 98%   BMI 30.87 kg/m²   Pulse  Av.8  Min: 54  Max: 67  Systolic (41TIQ), EPY:213 , Min:127 , TYV:037    Diastolic (68YNU), JOZ:47, Min:77, Max:111      In general, this is a well developed, well nourished who appears stated age. awake, alert, no apparent distress     HEENT: eyes -conjunctivae pink,   Neck-  no stridor, no carotid bruit. no jugular venous distention   RESPIRATORY: Chest symmetrical and non-tender to palpation.  No accessory muscles use.   Lung auscultation - few rhonchi  CARDIOVASCULAR:     Heart Palpation - no palpable thrills  Heart Ausculation - Regular rate and FKRIOV, 4-8/5 systolic murmur, No s3 or rub. No lower extremity edema, Distal pulses palpable, no cyanosis   ABDOMEN: Soft, nontender,  Bowel sounds present. MS: n/a. : Deferred  Rectal Exam: Deferred  SKIN: warm and dry  NEURO / PSYCH: oriented to person, place           Impression/Recommendations:     Acute HFrEF -  EF 30%, Change to PO diuretics-Monitor daily Wts, I/Os, BP, renal Fn; Low salt diet, CHF clinic f/u discussed     CP - No recurrence, Troponins flat; EKG reviewed-ST/T chagnes ischemia vs. LVH; Had stress test 10-15 years ago. Nonischemic Stress test 22     New LV dysfunction,  Dilated CMP  - EF 30%, Continue BB, Entresto. Add [facility] as Out-pt - Life Vest upon discharge.     Sinus bradycardia - Asymptomatic, Metoprolol decreased; Monitor HR, TSH OK     Essential HTN - Amlodipine added, BP controlled.      Acute cystitis without hematuria    Alcohol use - Counseled to quit drinking      Hx of Rheumatoid Arthritis - Per Dr Stuart Mejia     Non-morbid obesity - Lost 20 lbs in few months, Diet, exercise as tolerates and continued weight loss discussed.               Home today    F/u [facility] 1-2 weeks. Echo results and above recommendations discussed with her and her , all questions answered.         Electronically signed by Ronel Cullen MD on 5/12/2022 at 1:12 PM  [facility]) Cardiology

## 2022-05-13 VITALS
BODY MASS INDEX: 30.62 KG/M2 | HEART RATE: 69 BPM | RESPIRATION RATE: 17 BRPM | SYSTOLIC BLOOD PRESSURE: 98 MMHG | TEMPERATURE: 96.8 F | WEIGHT: 202 LBS | HEIGHT: 68 IN | OXYGEN SATURATION: 100 % | DIASTOLIC BLOOD PRESSURE: 73 MMHG

## 2022-05-13 LAB
ANION GAP SERPL CALCULATED.3IONS-SCNC: 11 MMOL/L (ref 7–16)
BUN BLDV-MCNC: 25 MG/DL (ref 6–23)
CALCIUM SERPL-MCNC: 9.8 MG/DL (ref 8.6–10.2)
CHLORIDE BLD-SCNC: 99 MMOL/L (ref 98–107)
CO2: 29 MMOL/L (ref 22–29)
CREAT SERPL-MCNC: 1.1 MG/DL (ref 0.5–1)
GFR AFRICAN AMERICAN: >60
GFR NON-AFRICAN AMERICAN: >60 ML/MIN/1.73
GLUCOSE BLD-MCNC: 147 MG/DL (ref 74–99)
MAGNESIUM: 2.3 MG/DL (ref 1.6–2.6)
POTASSIUM SERPL-SCNC: 4.3 MMOL/L (ref 3.5–5)
SODIUM BLD-SCNC: 139 MMOL/L (ref 132–146)

## 2022-05-13 PROCEDURE — 99214 OFFICE O/P EST MOD 30 MIN: CPT | Performed by: INTERNAL MEDICINE

## 2022-05-13 PROCEDURE — 83735 ASSAY OF MAGNESIUM: CPT

## 2022-05-13 PROCEDURE — 36415 COLL VENOUS BLD VENIPUNCTURE: CPT

## 2022-05-13 PROCEDURE — 2580000003 HC RX 258: Performed by: NURSE PRACTITIONER

## 2022-05-13 PROCEDURE — 6370000000 HC RX 637 (ALT 250 FOR IP): Performed by: INTERNAL MEDICINE

## 2022-05-13 PROCEDURE — G0378 HOSPITAL OBSERVATION PER HR: HCPCS

## 2022-05-13 PROCEDURE — 96372 THER/PROPH/DIAG INJ SC/IM: CPT

## 2022-05-13 PROCEDURE — 6360000002 HC RX W HCPCS: Performed by: INTERNAL MEDICINE

## 2022-05-13 PROCEDURE — 6370000000 HC RX 637 (ALT 250 FOR IP): Performed by: PHYSICIAN ASSISTANT

## 2022-05-13 PROCEDURE — 80048 BASIC METABOLIC PNL TOTAL CA: CPT

## 2022-05-13 PROCEDURE — 94640 AIRWAY INHALATION TREATMENT: CPT

## 2022-05-13 RX ADMIN — ALBUTEROL SULFATE 2.5 MG: 2.5 SOLUTION RESPIRATORY (INHALATION) at 08:40

## 2022-05-13 RX ADMIN — FUROSEMIDE 40 MG: 40 TABLET ORAL at 09:09

## 2022-05-13 RX ADMIN — PANTOPRAZOLE SODIUM 40 MG: 40 TABLET, DELAYED RELEASE ORAL at 09:11

## 2022-05-13 RX ADMIN — SODIUM CHLORIDE, PRESERVATIVE FREE 10 ML: 5 INJECTION INTRAVENOUS at 09:10

## 2022-05-13 RX ADMIN — METOPROLOL SUCCINATE 50 MG: 50 TABLET, EXTENDED RELEASE ORAL at 09:10

## 2022-05-13 RX ADMIN — ENOXAPARIN SODIUM 40 MG: 100 INJECTION SUBCUTANEOUS at 09:10

## 2022-05-13 RX ADMIN — SACUBITRIL AND VALSARTAN 1 TABLET: 24; 26 TABLET, FILM COATED ORAL at 09:10

## 2022-05-13 ASSESSMENT — PAIN SCALES - GENERAL: PAINLEVEL_OUTOF10: 0

## 2022-05-13 NOTE — PROGRESS NOTES
[facility]        CARDIOLOGY                 INPATIENT PROGRESS NOTE          PATIENT SEEN IN FOLLOW UP FOR: CHF, [address] [facility] Day: Naga Salas is a 62year old patient not known to [id number] [facility] cardiology. SUBJECTIVE: Denies any CP, Breathing better, No orthopnea. No palpitations or dizzy. \"Tired\" - Awaiting Live vest approval from insurance    ROS: Review of rest of 10 systems negative except as mentioned above    OBJECTIVE: No acute distress. See Assessment     Diagnostics:       Telemetry: Reviewed  Echo 5/11/22 - EF 30%. No intake or output data in the 24 hours ending 05/13/22 1229    Labs:   CBC:   No results for input(s): WBC, HGB, HCT, PLT in the last 72 hours. BMP:   Recent Labs     05/12/22  0647 05/13/22  0815    139   K 4.0 4.3   CO2 24 29   BUN 17 25*   CREATININE 0.9 1.1*   LABGLOM >60 >60   CALCIUM 9.6 9.8     Mag:   Recent Labs     05/12/22  0647 05/13/22  0815   MG 2.1 2.3     Phos: No results for input(s): PHOS in the last 72 hours. TSH:   No results for input(s): TSH in the last 72 hours. HgA1c:     BNP: No results for input(s): BNP in the last 72 hours. PT/INR: No results for input(s): PROTIME, INR in the last 72 hours. APTT:No results for input(s): APTT in the last 72 hours. CARDIAC ENZYMES:No results for input(s): CKTOTAL, CKMB, CKMBINDEX, TROPONINI in the last 72 hours. FASTING LIPID PANEL:  Lab Results   Component Value Date    CHOL 126 05/09/2022    HDL 68 05/09/2022    LDLCALC 43 05/09/2022    TRIG 75 05/09/2022     LIVER PROFILE:  No results for input(s): AST, ALT, LABALBU in the last 72 hours.     Current Inpatient Medications:   sacubitril-valsartan  1 tablet Oral BID    furosemide  40 mg Oral Daily    metoprolol succinate  50 mg Oral Daily    sodium chloride flush  5-40 mL IntraVENous 2 times per day    docusate sodium  100 mg Oral Nightly    albuterol  2.5 mg Nebulization 4x daily    pantoprazole  40 mg Oral QAM AC    enoxaparin  40 mg SubCUTAneous Daily       IV Infusions (if any):   sodium chloride           PHYSICAL EXAM:     CONSTITUTIONAL:   BP 98/73   Pulse 69   Temp 96.8 °F (36 °C) (Temporal)   Resp 17   Ht 5' 8\" (1.727 m)   Wt 202 lb (91.6 kg)   SpO2 100%   BMI 30.71 kg/m²   Pulse  Av.8  Min: 79  Max: 71  Systolic (98MOB), BDC:059 , Min:98 , WKY:264    Diastolic (94FTV), FNU:25, Min:73, Max:80      In general, this is a well developed, well nourished who appears stated age. awake, alert, no apparent distress     HEENT: eyes -conjunctivae pink,   Neck-  no stridor, no carotid bruit. no jugular venous distention   RESPIRATORY: Chest symmetrical. No accessory muscles use.   Lung auscultation - few rhonchi  CARDIOVASCULAR:     Heart Ausculation - Regular rate and XXSKSO, 0-4/0 systolic murmur, No s3 or rub. No lower extremity edema, Distal pulses palpable, no cyanosis   ABDOMEN: Soft,  Bowel sounds present. MS: n/a. : Deferred  Rectal Exam: Deferred  SKIN: warm and dry  NEURO / PSYCH: oriented to person, place           Impression/Recommendations:     Acute HFrEF -  EF 30%, Change to PO diuretics-Monitor daily Wts, I/Os, BP, renal Fn; Low salt diet, CHF clinic f/u discussed     CP - No recurrence, Troponins flat; EKG reviewed-ST/T chagnes ischemia vs. LVH; Nonischemic Stress test 22     New LV dysfunction,  Dilated CMP  - EF 30%, Continue BB, Entresto. Add [facility] as Out-pt - Life Vest upon discharge. Sinus bradycardia - Asymptomatic, resolved after her Metoprolol decreased; Monitor HR, TSH OK     Essential HTN - Amlodipine added, BP controlled.      Alcohol use - Drinks wine, counseled to quit drinking      Hx of Rheumatoid Arthritis - Per Dr Aneesh Carrillo     Non-morbid obesity - Lost 20 lbs in few months, Diet, exercise as tolerates and continued weight loss discussed.               Home today    F/u [facility] 1-2 weeks.     Echo results and above recommendations discussed with her, all questions answered.         Electronically signed by Fernando Corbett MD on 5/13/2022 at 12:29 PM  [facility] ([facility]) Cardiology

## 2022-05-13 NOTE — PLAN OF CARE
Problem: Discharge Planning  Goal: Discharge to home or other facility with appropriate resources  Outcome: Progressing  Flowsheets  Taken 5/13/2022 1522  Discharge to home or other facility with appropriate resources: Identify barriers to discharge with patient and caregiver  Taken 5/13/2022 0800  Discharge to home or other facility with appropriate resources:   Identify barriers to discharge with patient and caregiver   Arrange for needed discharge resources and transportation as appropriate   Identify discharge learning needs (meds, wound care, etc)     Problem: Pain  Goal: Verbalizes/displays adequate comfort level or baseline comfort level  Outcome: Progressing     Problem: Discharge Planning  Goal: Discharge to home or other facility with appropriate resources  5/13/2022 1536 by Adelina Salinas RN  Outcome: Completed  5/13/2022 1536 by Adelina Salinas RN  Outcome: Progressing  Flowsheets  Taken 5/13/2022 1522  Discharge to home or other facility with appropriate resources: Identify barriers to discharge with patient and caregiver  Taken 5/13/2022 0800  Discharge to home or other facility with appropriate resources:   Identify barriers to discharge with patient and caregiver   Arrange for needed discharge resources and transportation as appropriate   Identify discharge learning needs (meds, wound care, etc)     Problem: Pain  Goal: Verbalizes/displays adequate comfort level or baseline comfort level  5/13/2022 1536 by Adelina Salinas RN  Outcome: Completed  5/13/2022 1536 by Adelina Salinas RN  Outcome: Progressing     Problem: Safety - Adult  Goal: Free from fall injury  5/13/2022 1536 by Adelina Salinas RN  Outcome: Completed  5/13/2022 1536 by Adelina Salinas RN  Outcome: Progressing  Flowsheets (Taken 5/13/2022 1521)  Free From Fall Injury: Instruct family/caregiver on patient safety     Problem: Cardiovascular - Adult  Goal: Maintains optimal cardiac output and hemodynamic stability  5/13/2022 1536 by Adelina Salinas RN  Outcome: Completed  5/13/2022 1536 by Adelina Salinas RN  Outcome: Progressing  Flowsheets (Taken 5/13/2022 0800)  Maintains optimal cardiac output and hemodynamic stability:   Monitor blood pressure and heart rate   Assess for signs of decreased cardiac output     Problem: Metabolic/Fluid and Electrolytes - Adult  Goal: Hemodynamic stability and optimal renal function maintained  5/13/2022 1536 by Adelina Salinas RN  Outcome: Completed  5/13/2022 1536 by Adelina Salinas RN  Outcome: Progressing  Flowsheets (Taken 5/13/2022 0800)  Hemodynamic stability and optimal renal function maintained:   Monitor labs and assess for signs and symptoms of volume excess or deficit   Monitor intake, output and patient weight     Problem: Chronic Conditions and Co-morbidities  Goal: Patient's chronic conditions and co-morbidity symptoms are monitored and maintained or improved  5/13/2022 1536 by Adelina Salinas RN  Outcome: Completed  5/13/2022 1536 by Adelina Salinas RN  Outcome: Progressing  Flowsheets (Taken 5/13/2022 0800)  Care Plan - Patient's Chronic Conditions and Co-Morbidity Symptoms are Monitored and Maintained or Improved: Monitor and assess patient's chronic conditions and comorbid symptoms for stability, deterioration, or improvement

## 2022-05-13 NOTE — CARE COORDINATION
Received text message from [address] that they should receive authorization today for pt's lifevest today; discharge plan is home, pt to call for her own ride (per pt).

## 2022-05-13 NOTE — PROGRESS NOTES
Hospitalist Progress Note      PCP: Lucia Hooks MD    Date of Admission: 5/9/2022        Hospital Course:  *62 y.o. female presented with  SOB AND CHEST PAIN, , TIGHTNESS IN CHARACTER, LOCATED ACW, NON RADIATING, CONTINUOUS WITH NAUSEA, DIAPHORESIS AND PALPITATIONS . SHE HAS A KNOWN HISTORY OF RA, HTN, AND RECENTLY TREATED FOR UTI.  WAS ON A WEIGHT LOSS DRUG YEARS AGO THAT WAS TIED TO  HEART MURMURS** HAD A STRESS, AWAITING ECHO, FOUND TO HAVE A CARDIOMYOPATHY. STARTED ON ENTRESTO, NORVASC STOPPED.    *has life vest        Subjective: no comlaiants           Medications:  Reviewed    Infusion Medications    sodium chloride       Scheduled Medications    sacubitril-valsartan  1 tablet Oral BID    furosemide  40 mg Oral Daily    metoprolol succinate  50 mg Oral Daily    sodium chloride flush  5-40 mL IntraVENous 2 times per day    docusate sodium  100 mg Oral Nightly    albuterol  2.5 mg Nebulization 4x daily    pantoprazole  40 mg Oral QAM AC    enoxaparin  40 mg SubCUTAneous Daily     PRN Meds: technetium sestamibi, sodium chloride flush, sodium chloride, ondansetron **OR** ondansetron, acetaminophen **OR** acetaminophen, perflutren lipid microspheres, potassium chloride **OR** potassium alternative oral replacement **OR** potassium chloride, senna, albuterol    No intake or output data in the 24 hours ending 05/13/22 1727    Exam:    BP 98/73   Pulse 69   Temp 96.8 °F (36 °C) (Temporal)   Resp 17   Ht 5' 8\" (1.727 m)   Wt 202 lb (91.6 kg)   SpO2 100%   BMI 30.71 kg/m²     General appearance:  No apparent distress,   HEENT:  Normal cephalic, atraumatic without obvious deformity. Neck: Supple, with full range of motion. n. Trachea midline. Respiratory:  Normal respiratory effort. Clear to auscultation,   Cardiovascular:  Regular rate and rhythm   Abdomen: Soft, non-tender, non-distended with normal bowel sounds.   Musculoskeletal:  No clubbing, cyanosis or edema bilaterally.    Skin: Skin color, texture, turgor normal.  No rashes or lesions. Neurologic:  Neurovascularly intact without any focal sensory/motor deficits. Cranial nerves: II-XII intact, grossly non-focal.  Psychiatric:  Alert and oriented, thought content appropriate, normal insight                              Labs:   No results for input(s): WBC, HGB, HCT, PLT in the last 72 hours. Recent Labs     05/11/22  0732 05/12/22  0647 05/13/22  0815    139 139   K 4.0 4.0 4.3    101 99   CO2 29 24 29   BUN 18 17 25*   CREATININE 0.9 0.9 1.1*   CALCIUM 9.4 9.6 9.8     No results for input(s): AST, ALT, BILIDIR, BILITOT, ALKPHOS in the last 72 hours. No results for input(s): INR in the last 72 hours. No results for input(s): Sherif Elgin in the last 72 hours. No results for input(s): AST, ALT, ALB, BILIDIR, BILITOT, ALKPHOS in the last 72 hours. No results for input(s): LACTA in the last 72 hours.   No results found for: Ekaterina Kast  No results found for: AMMONIA    Assessment:  ACUTE on chronic diastolic heart failure    HTN   RHEUMATOID ARTHRITIS   HEART MURMUR (TIED TO WEIGHT LOSS DRUG YEARS AGO)  RECENT UTI  ICM        Plan:  Dc home      Electronically signed by Olga Lidia Cortez DO on 5/13/2022 at 5:27 PM [facility]

## 2022-05-16 NOTE — DISCHARGE SUMMARY
Hospitalist Discharge Summary    Patient ID: Annelise Fields   Patient : 1960  Patient's PCP: Laure Sanchez MD    Admit Date: 2022   Admitting Physician: Urvashi Corbett DO    Discharge Date:  2022   Discharge Physician: Urvashi Corbett DO   Discharge Condition: Stable  Discharge Disposition: Home      Discharge Diagnoses:     ACUTE on chronic diastolic heart failure    HTN   RHEUMATOID ARTHRITIS   HEART MURMUR (TIED TO WEIGHT LOSS DRUG YEARS AGO)  RECENT UTI  [facility]              Hospital course in brief:  62 y.o. female presented with  SOB AND CHEST PAIN, , TIGHTNESS IN CHARACTER, LOCATED ACW, NON RADIATING, CONTINUOUS WITH NAUSEA, DIAPHORESIS AND PALPITATIONS . SHE HAS A KNOWN HISTORY OF RA, HTN, AND RECENTLY TREATED FOR UTI.  WAS ON A WEIGHT LOSS DRUG YEARS AGO THAT WAS TIED TO  HEART MURMURS** HAD A STRESS, AWAITING ECHO, FOUND TO HAVE A CARDIOMYOPATHY. STARTED ON ENTRESTO, NORVASC STOPPED.    *has life vest         PHYSICAL EXAM:    BP 98/73   Pulse 69   Temp 96.8 °F (36 °C) (Temporal)   Resp 17   Ht 5' 8\" (1.727 m)   Wt 202 lb (91.6 kg)   SpO2 100%   BMI 30.71 kg/m²     General appearance:  No apparent distress,   HEENT:  Normal cephalic, atraumatic without obvious deformity. Neck: Supple, with full range of motion. n. Trachea midline. Respiratory:  Normal respiratory effort. Clear to auscultation,   Cardiovascular:  Regular rate and rhythm   Abdomen: Soft, non-tender, non-distended with normal bowel sounds. Musculoskeletal:  No clubbing, cyanosis or edema bilaterally.    Skin: Skin color, texture, turgor normal.  No rashes or lesions. Neurologic:  Neurovascularly intact without any focal sensory/motor deficits. Cranial nerves: II-XII intact, grossly non-focal.  Psychiatric:  Alert and oriented, thought content appropriate, normal insight          Prior to Admission medications    Medication Sig Start Date End Date Taking?  Authorizing Provider   metoprolol succinate (TOPROL XL) 50 MG extended release tablet Take 1 tablet by mouth daily 5/13/22  Yes Zo Leon DO   furosemide (LASIX) 40 MG tablet Take 1 tablet by mouth daily 5/13/22  Yes He Herndon, DO   potassium chloride (KLOR-CON M) 20 MEQ extended release tablet Take 1 tablet by mouth daily 5/12/22  Yes Zo Leon DO   sacubitril-valsartan (ENTRESTO) 24-26 MG per tablet Take 1 tablet by mouth 2 times daily 5/12/22  Yes Zo Leon DO   meloxicam (MOBIC) 15 MG tablet Take 15 mg by mouth daily   Yes Historical Provider, MD   fluticasone (FLONASE) 50 MCG/ACT nasal spray 1 spray by Nasal route daily as needed for Rhinitis   Yes Historical Provider, MD       Consults:   IP CONSULT TO HOSPITALIST  IP CONSULT TO HEART FAILURE NURSE/COORDINATOR  IP CONSULT TO DIETITIAN  IP CONSULT TO SOCIAL WORK  IP CONSULT TO CARDIOLOGY            Discharge Instructions / Follow up:    Future Appointments   Date Time Provider Shaun Tillman   6/2/2022  8:30 AM [facility] ROOM 1 [facility]   6/10/2022  9:00 AM [facility]       Continued appropriate risk factor modification of blood pressure, diabetes and serum lipids will remain essential to reducing risk of future atherosclerotic development    Activity: activity as tolerated    Significant labs:  CBC:   No results for input(s): WBC, RBC, HGB, HCT, MCV, RDW, PLT in the last 72 hours. BMP:   Recent Labs     05/13/22  0815      K 4.3   CL 99   CO2 29   BUN 25*   CREATININE 1.1*   MG 2.3     LFT:  No results for input(s): PROT, ALB, ALKPHOS, ALT, AST, BILITOT, AMYLASE, LIPASE in the last 72 hours. PT/INR: No results for input(s): INR, APTT in the last 72 hours. BNP: No results for input(s): BNP in the last 72 hours.   Hgb A1C:   Lab Results   Component Value Date    LABA1C 5.6 05/09/2022     Folate and B12: No results found for: CIIBKRPR50, No results found for: FOLATE  Thyroid Studies:   Lab Results   Component Value Date    TSH 0.532 05/09/2022       Urinalysis:    Lab Results   Component Value Date    NITRU Negative 05/09/2022    WBCUA 5-10 05/09/2022    BACTERIA FEW 05/09/2022    RBCUA 1-3 05/09/2022    BLOODU TRACE-INTACT 05/09/2022    SPECGRAV >=1.030 05/09/2022    GLUCOSEU Negative 05/09/2022       Imaging:  Echo Complete    Result Date: 5/11/2022  Transthoracic Echocardiography Report (TTE)  Demographics   Patient Name       Mack Meléndez Gender               Female   Medical Record     [id number]      Room Number          3570  Number   Account #          [de-identified]     Procedure Date       05/11/2022   Corporate ID                     Ordering Physician   Shae Mcpherson MD   Accession Number   [id number]    Referring Physician   Date of Birth      1960    Anthony Brigid Paddy Julia   Age                62 year(s)    Interpreting         Shae Mcpherson MD                                   Physician                                    Any Other  Procedure Type of Study   TTE procedure  Procedure Date Date: 05/11/2022 Start: 02:29 PM Study Location: Portable Technical Quality: Adequate visualization Indications:Shortness of breath. Patient Status: Routine Height: 68 inches Weight: 210 pounds BSA: 2.09 m^2 BMI: 31.93 kg/m^2 BP: 137/71 mmHg  Findings   Left Ventricle  Left ventricular chamber mildly dilated, 6cm. Severe global hypokinesis, LV EF 30%. Stage I diastolic dysfunction. Mitral Valve  Normal mitral valve structure. Trace mitral regurgitation. No mitral valve prolapse. Pericardial Effusion  No evidence of pericardial effusion. Conclusions   Summary  Limited Study for LV EF. Left ventricular chamber mildly dilated, 6cm. Severe global hypokinesis, LV EF 30%. Stage I diastolic dysfunction. Trace mitral regurgitation. No mitral valve prolapse. No evidence of pericardial effusion. No intra cardiac mass or thrombus. No comparison study available.    Signature ----------------------------------------------------------------  Electronically signed by Sandeep Villarreal MD(Interpreting  physician) on 05/11/2022 03:19 PM  ----------------------------------------------------------------  M-Mode/2D Measurements & Calculations   LV Diastolic     LV Systolic Dimension: 5 cm        LA Dimension: 4.2 cm  Dimension: 6 cm  LV Volume Diastolic: 595 ml  LV OK:82.2 %     LV Volume Systolic: 788.8 ml  LV PW Diastolic: LV EDV/LV EDV Index: 178 ml/85  1.7 cm           ml/m^2LV ESV/LV ESV Index: 692.2   RV Diastolic  LV PW Systolic:  DY/58TE/ m^2                       Dimension: 2.3 cm  1.8 cm           EF Calculated: 32.3 %  Septum           LV Mass Index: 224 l/min*m^2  Diastolic: 1.5   LV Length: 7.7 cm                  LA volume/Index: 73.9  cm                                                  ml /97.63LV/O^1  Septum Systolic: LVOT: 2 cm  1.7 cm                                                      IVC Expiration: 2.1 cm  LV Mass: 468.76  g  Doppler Measurements & Calculations   MV Peak E-Wave:     AV Peak Velocity: 1.35 LVOT Peak Velocity: 0.81 m/s  0.56 m/s            m/s                    LVOT Mean Velocity: 0.52 m/s  MV Peak A-Wave: 0.9 AV Peak Gradient: 7.25 LVOT Peak Gradient: 2.6  m/s                 mmHg                   mmHgLVOT Mean Gradient: 1.3  MV E/A Ratio: 0.62  AV Mean Velocity: 1    mmHg                      m/s                    Estimated RVSP: 9.8 mmHg                      AV Mean Gradient: 4.4  Estimated RAP:3 mmHg  MV Deceleration     mmHg  Time: 207.4 msec    AV VTI: 23.3 cm                      AV Area                TR Velocity:1.31 m/s                      (Continuity):1.74 cm^2 TR Gradient:6.84 mmHg  MV E' Septal  Velocity: 0.03 m/s  LVOT VTI: 12.9 cm  MV E' Lateral  Velocity: 2 m/s     Estimated PASP: 9.84                      mmHg  [email or web address]    XR CHEST (2 VW)    Result Date: 4/21/2022  EXAMINATION: TWO XRAY VIEWS OF THE CHEST 4/21/2022 2:19 pm COMPARISON: CT scan of the chest dated 09/10/2014 HISTORY: ORDERING SYSTEM PROVIDED HISTORY: Shortness of breath TECHNOLOGIST PROVIDED HISTORY: What reading provider will be dictating this exam?->CRC FINDINGS: The heart is enlarged. No findings of failure. There is no mediastinal widening The lungs are clear. There is no focal infiltrate or effusion. 1. Cardiomegaly. There are no findings of failure or pneumonia. CT ABDOMEN PELVIS W IV CONTRAST Additional Contrast? None    Result Date: 5/9/2022  EXAMINATION: CT OF THE ABDOMEN AND PELVIS WITH CONTRAST5/9/2022 4:36 am TECHNIQUE: CT of the abdomen and pelvis was performed with the administration of intravenous contrast. Multiplanar reformatted images are provided for review. Dose modulation, iterative reconstruction, and/or weight based adjustment of the mA/kV was utilized to reduce the radiation dose to as low as reasonably achievable. COMPARISON: 9/24/2016 HISTORY: ORDERING SYSTEM PROVIDED HISTORY: abdominal pain TECHNOLOGIST PROVIDED HISTORY: Reason for exam:->abdominal pain Additional Contrast?->None Decision Support Exception - unselect if not a suspected or confirmed emergency medical condition->Emergency Medical Condition (MA) What reading provider will be dictating this exam?->CRC FINDINGS: THORACIC STRUCTURES: Clear, cardiomegaly. LIVER:  The liver is normal in size, contour and attenuation. No focal mass. No intra or extrahepatic bile duct dilation. GALL BLADDER: Unremarkable. SPLEEN:  Unremarkable. PANCREAS:  Unremarkable. ADRENAL GLANDS:  Unremarkable. ESOPHAGUS AND STOMACH:  Unremarkable. BOWEL: Small bowel:  Unremarkable. Large bowel: The colon and rectum are of normal course and caliber. The appendix is within normal limits. There is no intraperitoneal free air or fluid. Diverticulosis. URINARY/GENITAL TRACT: Kidneys:  The kidneys enhance symmetrically. No evidence of hydronephrosis, renal calcifications or solid renal mass. Ureters: The ureters are normal course and caliber. There is no evidence of ureter calculus/calculi. URINARY BLADDER:  The urinary bladder is well distended without wall thickening or focal mass. UTERUS AND ADNEXA:  Unremarkable. BLOOD VESSELS: normal LYMPH NODES:  No evidence of intraabdominal or intrapelvic lymphadenopathy. ABDOMINAL WALL & SOFT TISSUES: Unremarkable. There is a fat containing supraumbilical hernia. OSSEOUS STRUCTURES: No acute osseous lesion. Diverticulosis of the colon. A fat containing supraumbilical hernia. XR CHEST PORTABLE    Result Date: 5/9/2022  EXAMINATION: ONE XRAY VIEW OF THE CHEST 5/9/2022 2:47 am COMPARISON: April 21, 2022. HISTORY: ORDERING SYSTEM PROVIDED HISTORY: sob TECHNOLOGIST PROVIDED HISTORY: Reason for exam:->sob What reading provider will be dictating this exam?->CRC FINDINGS: Stable cardiomegaly. Pulmonary vasculature is within normal limits. No effusion, focal consolidation or pneumothorax is seen. No acute osseous abnormalities. No acute findings. CTA PULMONARY W CONTRAST    Result Date: 5/9/2022  EXAMINATION: CTA OF THE CHEST 5/9/2022 4:27 am TECHNIQUE: CTA of the chest was performed after the administration of intravenous contrast.  Multiplanar reformatted images are provided for review. MIP images are provided for review. Dose modulation, iterative reconstruction, and/or weight based adjustment of the mA/kV was utilized to reduce the radiation dose to as low as reasonably achievable. COMPARISON: None.  HISTORY: ORDERING SYSTEM PROVIDED HISTORY: chest pain, elevated dimer TECHNOLOGIST PROVIDED HISTORY: Reason for exam:->chest pain, elevated dimer Decision Support Exception - unselect if not a suspected or confirmed emergency medical condition->Emergency Medical Condition (MA) What reading provider will be dictating this exam?->CRC FINDINGS: Pulmonary Arteries: Pulmonary arteries are adequately opacified for evaluation. No evidence of intraluminal filling defect to suggest pulmonary embolism. Main pulmonary artery is normal in caliber. Mediastinum: No evidence of mediastinal lymphadenopathy. The heart is enlarged. There is no acute abnormality of the thoracic aorta. Lungs/pleura: The lungs are without acute process. No focal consolidation or pulmonary edema. No evidence of pleural effusion or pneumothorax. Upper Abdomen: Limited images of the upper abdomen are unremarkable. Soft Tissues/Bones: No acute bone or soft tissue abnormality. No evidence of pulmonary embolism or acute pulmonary abnormality. Cardiomegaly. NM Cardiac Stress Test Nuclear Imaging    Result Date: 5/11/2022  Indication:  Chest pain and CHF Clinical History:   Patient has no history of coronary artery disease. IMAGING: Myocardial perfusion imaging was performed at rest 30-35 minutes following the intravenous injection of 12 mCi of (Tc-Sestamibi) followed by 10 ml of Normal Saline. At peak exercise, the patient was injected intravenously with 32mCi of (Tc-Sestamibi) followed by 10 ml of Normal Saline. Gated post-stress tomographic imaging was performed 20-25 minutes after stress. FINDINGS: The overall quality of the study was adequate. Left ventricular cavity size was noted to be dilated during stress and rest. There is a large fixed anterior defect noted which was not present on the CT attenuation corrected images suggestive at the motion artifact. Apical thinning noted. Rotational analog analysis demonstrated no significant motion artifacts. The gated SPECT stress imaging in the short, vertical long, and horizontal long axis demonstrated normal homogeneous tracer distribution throughout the myocardium on stress and rest images. Gated SPECT left ventricular ejection fraction was calculated to be 30%, with severe global hypokinesis. The myocardial perfusion imaging was abnormal. The abnormality was based on dilated left ventricle with severe left ventricular dysfunction. No evidence of Lexiscan stress-induced reversible ischemia. Left ventricular systolic function was severely reduced, EF 30% Overall intermediate risk myocardial perfusion study. No prior study available for comparison. Fran Joshi MD, [facility]       Discharge Medications:      Medication List      START taking these medications    furosemide 40 MG tablet  Commonly known as: LASIX  Take 1 tablet by mouth daily     potassium chloride 20 MEQ extended release tablet  Commonly known as: KLOR-CON M  Take 1 tablet by mouth daily     sacubitril-valsartan 24-26 MG per tablet  Commonly known as: ENTRESTO  Take 1 tablet by mouth 2 times daily        CHANGE how you take these medications    metoprolol succinate 50 MG extended release tablet  Commonly known as: TOPROL XL  Take 1 tablet by mouth daily  What changed:   · medication strength  · how much to take        CONTINUE taking these medications    fluticasone 50 MCG/ACT nasal spray  Commonly known as: FLONASE     Mobic 15 MG tablet  Generic drug: meloxicam           Where to Get Your Medications      These medications were sent to [facility], [address] [facility].  [facility] [phone] [facility] [phone]  [address] [place] [address], [address] [place] [phone]    Phone: [phone]   · furosemide 40 MG tablet  · metoprolol succinate 50 MG extended release tablet  · potassium chloride 20 MEQ extended release tablet  · sacubitril-valsartan 24-26 MG per tablet         Time Spent on discharge is more than 45 minutes in the examination, evaluation, counseling and review of medications and discharge plan.    +++++++++++++++++++++++++++++++++++++++++++++++++  Pepito Marie, DO  [address], [place]  +++++++++++++++++++++++++++++++++++++++++++++++++  NOTE: This report was transcribed using voice recognition software. Every effort was made to ensure accuracy; however, inadvertent computerized transcription errors may be present.

## 2022-05-31 ASSESSMENT — EJECTION FRACTION
EF_VALUE: 30%
EF_SOURCE: 2D ECHO

## 2022-06-01 ENCOUNTER — HOSPITAL ENCOUNTER (OUTPATIENT)
Dept: OTHER | Age: 62
Setting detail: THERAPIES SERIES
Discharge: HOME | End: 2022-06-01
Payer: COMMERCIAL

## 2022-06-01 VITALS
WEIGHT: 206 LBS | SYSTOLIC BLOOD PRESSURE: 142 MMHG | BODY MASS INDEX: 31.32 KG/M2 | HEART RATE: 60 BPM | RESPIRATION RATE: 16 BRPM | DIASTOLIC BLOOD PRESSURE: 69 MMHG

## 2022-06-01 LAB
ANION GAP SERPL CALCULATED.3IONS-SCNC: 11 MMOL/L (ref 7–16)
BUN BLDV-MCNC: 13 MG/DL (ref 6–23)
CALCIUM SERPL-MCNC: 9.3 MG/DL (ref 8.6–10.2)
CHLORIDE BLD-SCNC: 104 MMOL/L (ref 98–107)
CO2: 25 MMOL/L (ref 22–29)
CREAT SERPL-MCNC: 0.9 MG/DL (ref 0.5–1)
GFR AFRICAN AMERICAN: >60
GFR NON-AFRICAN AMERICAN: >60 ML/MIN/1.73
GLUCOSE BLD-MCNC: 101 MG/DL (ref 74–99)
POTASSIUM SERPL-SCNC: 4.2 MMOL/L (ref 3.5–5)
PRO-BNP: 614 PG/ML (ref 0–125)
SODIUM BLD-SCNC: 140 MMOL/L (ref 132–146)

## 2022-06-01 PROCEDURE — 80048 BASIC METABOLIC PNL TOTAL CA: CPT

## 2022-06-01 PROCEDURE — 83880 ASSAY OF NATRIURETIC PEPTIDE: CPT

## 2022-06-01 PROCEDURE — 36415 COLL VENOUS BLD VENIPUNCTURE: CPT

## 2022-06-01 PROCEDURE — 99204 OFFICE O/P NEW MOD 45 MIN: CPT

## 2022-06-01 NOTE — PROGRESS NOTES
Congestive Heart Failure [address]   1960          Referring Provider: Juliana Reza  Primary Care Physician: Dr. Jarek Flynn  Cardiologist:   Nephrologist:         History of Present Illness:     Reji Thompson is a 62 y.o. female with a history of HFrEF, most recent EF 30%  from 5/11/22. Patient Story:    She does not  have dyspnea with exertion, shortness of breath, or decline in overall functional capacity. She does not have orthopnea, PND, nocturnal cough or hemoptysis. She does not have abdominal distention or bloating, early satiety, anorexia/change in appetite. She does have a good urinary response to oral diuretic. She does not have  lower extremity edema. She denies lightheadedness, dizziness. She denies palpitations, syncope or near syncope. She does not complain of chest pain, pressure, discomfort. Allergies   Allergen Reactions    Penicillins Anaphylaxis    Penicillins          No outpatient medications have been marked as taking for the 6/1/22 encounter [facility] Encounter) with [facility] ROOM 1. Prior to Visit Medications    Medication Sig Taking?  Authorizing Provider   metoprolol succinate (TOPROL XL) 50 MG extended release tablet Take 1 tablet by mouth daily  Lita Points, DO   furosemide (LASIX) 40 MG tablet Take 1 tablet by mouth daily  Lita Szymanski, DO   potassium chloride (KLOR-CON M) 20 MEQ extended release tablet Take 1 tablet by mouth daily  Fly Leon, DO   sacubitril-valsartan (ENTRESTO) 24-26 MG per tablet Take 1 tablet by mouth 2 times daily  Pepito Briones, DO   meloxicam (MOBIC) 15 MG tablet Take 15 mg by mouth daily  Historical Provider, MD   fluticasone (FLONASE) 50 MCG/ACT nasal spray 1 spray by Nasal route daily as needed for Rhinitis  Patient not taking: Reported on 6/1/2022  Historical Provider, MD           Guideline directed medical:  ARNI/ACE I/ARB: Yes  Beta blocker: Yes  Aldosterone antagonist:  No        Physical Examination:     BP (!) 142/69   Pulse 60   Resp 16   Wt 206 lb (93.4 kg)   BMI 31.32 kg/m²     Assessment  Charting Type: Shift assessment    Neurological  Level of Consciousness: Alert (0)              Respiratory  Respiratory Pattern: Regular  Respiratory Depth: Normal  Respiratory Quality/Effort: Unlabored  Chest Assessment: Chest expansion asymmetrical  L Breath Sounds: Clear,Diminished  R Breath Sounds: Clear,Diminished              Cardiac  Cardiac Regularity: Regular  Cardiac Rhythm: Sinus rhythm    Rhythm Interpretation  Heart Rate: 60         Gastrointestinal  Abdominal (WDL): Within Defined Limits               Peripheral Vascular  Peripheral Vascular (WDL): Within Defined Limits                                                 Heart Rate: 60                     LAB DATA:    Last 3 BMP      Sodium (mmol/L)   Date Value   05/13/2022 139   05/12/2022 139   05/11/2022 140     Potassium (mmol/L)   Date Value   05/13/2022 4.3   05/12/2022 4.0   05/11/2022 4.0     Chloride (mmol/L)   Date Value   05/13/2022 99   05/12/2022 101   05/11/2022 102     CO2 (mmol/L)   Date Value   05/13/2022 29   05/12/2022 24   05/11/2022 29     BUN (mg/dL)   Date Value   05/13/2022 25 (H)   05/12/2022 17   05/11/2022 18     Glucose (mg/dL)   Date Value   05/13/2022 147 (H)   05/12/2022 157 (H)   05/11/2022 88     Calcium (mg/dL)   Date Value   05/13/2022 9.8   05/12/2022 9.6   05/11/2022 9.4       Last 3 BNP       Pro-BNP (pg/mL)   Date Value   05/12/2022 683 (H)   05/09/2022 1,421 (H)          CBC: No results for input(s): WBC, HGB, PLT in the last 72 hours. BMP:  No results for input(s): NA, K, CL, CO2, BUN, CREATININE, GLUCOSE in the last 72 hours. Hepatic: No results for input(s): AST, ALT, ALB, BILITOT, ALKPHOS in the last 72 hours. Troponin: No results for input(s): TROPONINI in the last 72 hours. BNP: No results for input(s): BNP in the last 72 hours.   Lipids: No results for input(s): CHOL, HDL in the last 72 hours. Invalid input(s): LDLCALCU  INR: No results for input(s): INR in the last 72 hours. WEIGHTS:    Wt Readings from Last 3 Encounters:   06/01/22 206 lb (93.4 kg)   05/13/22 202 lb (91.6 kg)   08/18/21 219 lb (99.3 kg)         TELEMETRY:  Cardiac Regularity: Regular  Cardiac Rhythm/Interpretation: NSR, SB        ASSESSMENT:  Mirela Monae is evolemic with stable weights. Presents to CHF clinic for 1st visit. Instructed on importance of low sodium diet. Given Caring for your heart book, Zone paper & low sodium information. Patient states she does weight self every morning and has a very good response to oral diuretic. Interventions completed this visit:  IV diuretics given no  Lab work obtained yes, BMP, BNP   Reviewed currently prescribed medications with patient, educated on importance of compliance and answered any questions regarding their medication  Educated on signs and symptoms of HF  Educated on low sodium diet    PLAN:  Scheduled to follow up in CHF clinic on   Future Appointments   Date Time Provider Shaun Tillman   6/10/2022  9:00 AM MEHDI Roblero - CNP [facility]   6/15/2022  8:00 AM [facility] ROOM 1 [address]     Given clinic phone number and aware of signs and symptoms to call with any HF change in symptoms.

## 2022-06-01 NOTE — RESULT ENCOUNTER NOTE
Labs and CHF clinic note reviewed  Vitals at CHF clinic: /69   Pulse 60    Current GDMT:  Entresto 24/26 mg BID  Toprol 50 mg daily (limited titration secondary to HR)  Lasix 40 mg daily   KDUR 20 meq daily       Please have her stop potassium supplements  Start spironolactone 25 mg daily   Follow up labs in 3-4 days    Thank you

## 2022-06-02 ENCOUNTER — HOSPITAL ENCOUNTER (OUTPATIENT)
Dept: OTHER | Age: 62
Setting detail: THERAPIES SERIES
Discharge: HOME | End: 2022-06-02
Payer: COMMERCIAL

## 2022-06-02 ENCOUNTER — TELEPHONE (OUTPATIENT)
Dept: CARDIOLOGY CLINIC | Age: 62
End: 2022-06-02

## 2022-06-02 DIAGNOSIS — Z79.899 NEW MEDICATION ADDED: ICD-10-CM

## 2022-06-02 DIAGNOSIS — I50.9 ACUTE HEART FAILURE, UNSPECIFIED HEART FAILURE TYPE (HCC): Primary | ICD-10-CM

## 2022-06-02 RX ORDER — SPIRONOLACTONE 25 MG/1
25 TABLET ORAL DAILY
Qty: 90 TABLET | Refills: 1 | Status: SHIPPED | OUTPATIENT
Start: 2022-06-02

## 2022-06-02 NOTE — TELEPHONE ENCOUNTER
I have reviewed the provider's instructions with the patient, answering all questions to her satisfaction. Financial hardship for [facility]  Transferred her to [facility] cardiology [phone]  for Entresto samples    (I took her David Costellored form from [facility] yesterday  to [place] Cazares at [phone] [facility] Rx assistance yesterday)   6/2/2022    Provided Edson Mccormick Rx assistance # to f/u on application.     Future Appointments   Date Time Provider Shaun Tillman   6/10/2022  9:00 AM MEHDI Mike - CNP [place] [facility]   6/15/2022  8:00 AM [facility] ROOM 1 [facility]

## 2022-06-02 NOTE — TELEPHONE ENCOUNTER
----- Message from MEHDI Mendoza CNP sent at 6/1/2022 12:26 PM EDT -----  Labs and CHF clinic note reviewed  Vitals at CHF clinic: /69   Pulse 60    Current GDMT:  Entresto 24/26 mg BID  Toprol 50 mg daily (limited titration secondary to HR)  Lasix 40 mg daily   KDUR 20 meq daily       Please have her stop potassium supplements  Start spironolactone 25 mg daily   Follow up labs in 3-4 days    Thank you

## 2022-06-06 ENCOUNTER — HOSPITAL ENCOUNTER (OUTPATIENT)
Age: 62
Discharge: HOME | End: 2022-06-06
Payer: COMMERCIAL

## 2022-06-06 DIAGNOSIS — I50.9 ACUTE HEART FAILURE, UNSPECIFIED HEART FAILURE TYPE (HCC): ICD-10-CM

## 2022-06-06 DIAGNOSIS — Z79.899 NEW MEDICATION ADDED: ICD-10-CM

## 2022-06-06 LAB
ANION GAP SERPL CALCULATED.3IONS-SCNC: 10 MMOL/L (ref 7–16)
BUN BLDV-MCNC: 16 MG/DL (ref 6–23)
CALCIUM SERPL-MCNC: 9.8 MG/DL (ref 8.6–10.2)
CHLORIDE BLD-SCNC: 105 MMOL/L (ref 98–107)
CO2: 28 MMOL/L (ref 22–29)
CREAT SERPL-MCNC: 1.1 MG/DL (ref 0.5–1)
GFR AFRICAN AMERICAN: >60
GFR NON-AFRICAN AMERICAN: >60 ML/MIN/1.73
GLUCOSE BLD-MCNC: 93 MG/DL (ref 74–99)
POTASSIUM SERPL-SCNC: 4.5 MMOL/L (ref 3.5–5)
PRO-BNP: 507 PG/ML (ref 0–125)
SODIUM BLD-SCNC: 143 MMOL/L (ref 132–146)

## 2022-06-06 PROCEDURE — 83880 ASSAY OF NATRIURETIC PEPTIDE: CPT

## 2022-06-06 PROCEDURE — 36415 COLL VENOUS BLD VENIPUNCTURE: CPT

## 2022-06-06 PROCEDURE — 80048 BASIC METABOLIC PNL TOTAL CA: CPT

## 2022-06-10 ENCOUNTER — TELEPHONE (OUTPATIENT)
Dept: CARDIOLOGY CLINIC | Age: 62
End: 2022-06-10

## 2022-06-10 ENCOUNTER — OFFICE VISIT (OUTPATIENT)
Dept: CARDIOLOGY CLINIC | Age: 62
End: 2022-06-10
Payer: COMMERCIAL

## 2022-06-10 VITALS
HEART RATE: 55 BPM | WEIGHT: 207 LBS | SYSTOLIC BLOOD PRESSURE: 114 MMHG | RESPIRATION RATE: 18 BRPM | BODY MASS INDEX: 31.37 KG/M2 | DIASTOLIC BLOOD PRESSURE: 80 MMHG | HEIGHT: 68 IN

## 2022-06-10 DIAGNOSIS — I50.9 ACUTE HEART FAILURE, UNSPECIFIED HEART FAILURE TYPE (HCC): Primary | ICD-10-CM

## 2022-06-10 DIAGNOSIS — E61.1 IRON DEFICIENCY: ICD-10-CM

## 2022-06-10 DIAGNOSIS — I50.20 HFREF (HEART FAILURE WITH REDUCED EJECTION FRACTION) (HCC): ICD-10-CM

## 2022-06-10 DIAGNOSIS — G47.33 OSA (OBSTRUCTIVE SLEEP APNEA): ICD-10-CM

## 2022-06-10 PROCEDURE — 99215 OFFICE O/P EST HI 40 MIN: CPT | Performed by: NURSE PRACTITIONER

## 2022-06-10 PROCEDURE — 93000 ELECTROCARDIOGRAM COMPLETE: CPT | Performed by: INTERNAL MEDICINE

## 2022-06-10 RX ORDER — FUROSEMIDE 40 MG/1
40 TABLET ORAL DAILY PRN
Qty: 30 TABLET | Refills: 1 | Status: SHIPPED
Start: 2022-06-10 | End: 2022-09-07

## 2022-06-10 NOTE — TELEPHONE ENCOUNTER
[facility] 82/18  Approved today  PA Case: [id number]  Status: Approved  Coverage Starts on: 6/10/2022 - 6/10/2023

## 2022-06-10 NOTE — PATIENT INSTRUCTIONS
1. Increase Entresto to 49/51 mg twice daily     2. Change lasix to 40 mg AS NEEDED, for weight gain, shortness of breath, swelling. 3. Continue rest of current cardiac medications    4. CHF clinic in one week as scheduled, will draw viral studies and iron studies at visit    5. Referral for cardiac rehab    6. Referral for sleep study     7. Continue to wear LifeVest    8. Follow up with Dr. Venu Gonzalez in 2-3 months     9. Weigh yourself daily    -Stay Hydrated    -Diet should sodium restricted to 2 grams    -Again watch your daily weight trends and if you gain water weight please follow below instructions.    -If you gain 3-5 pounds in 2-3 days OR notice that you are retaining fluid in anyway just like you did before then take an extra dose of your water pill (furosemide/Lasix) every day until you lose the weight or feel better.     -If you notice that you have taken more than 2 extra doses in 1 week then please call and let us know. -If at any time you feel that you are retaining fluid, your medications are not working, or you feel ill in anyway, then please call us for either same day appointment or the next day, and for instructions. Our goal is to keep you out of the emergency room and the hospital and we have ways to do it. You just need to call us in a timely manner.     -If you become sick for other reasons, and notice that you are not urinating as much, the urine is very dark, you have significant diarrhea or vomiting, then please DO NOT take your water pill and CALL US immediately.
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
## 2022-06-15 ENCOUNTER — HOSPITAL ENCOUNTER (OUTPATIENT)
Age: 62
Discharge: HOME | End: 2022-06-17

## 2022-06-15 ENCOUNTER — HOSPITAL ENCOUNTER (OUTPATIENT)
Dept: OTHER | Age: 62
Setting detail: THERAPIES SERIES
Discharge: HOME | End: 2022-06-15
Payer: COMMERCIAL

## 2022-06-15 VITALS
SYSTOLIC BLOOD PRESSURE: 113 MMHG | RESPIRATION RATE: 18 BRPM | HEART RATE: 60 BPM | OXYGEN SATURATION: 99 % | DIASTOLIC BLOOD PRESSURE: 63 MMHG | BODY MASS INDEX: 31.63 KG/M2 | WEIGHT: 208 LBS

## 2022-06-15 DIAGNOSIS — E61.1 IRON DEFICIENCY: ICD-10-CM

## 2022-06-15 DIAGNOSIS — I50.20 HFREF (HEART FAILURE WITH REDUCED EJECTION FRACTION) (HCC): ICD-10-CM

## 2022-06-15 LAB
ANION GAP SERPL CALCULATED.3IONS-SCNC: 11 MMOL/L (ref 7–16)
BUN BLDV-MCNC: 18 MG/DL (ref 6–23)
CALCIUM SERPL-MCNC: 9.3 MG/DL (ref 8.6–10.2)
CHLORIDE BLD-SCNC: 105 MMOL/L (ref 98–107)
CO2: 26 MMOL/L (ref 22–29)
CREAT SERPL-MCNC: 0.9 MG/DL (ref 0.5–1)
FERRITIN: 377 NG/ML
GFR AFRICAN AMERICAN: >60
GFR NON-AFRICAN AMERICAN: >60 ML/MIN/1.73
GLUCOSE BLD-MCNC: 98 MG/DL (ref 74–99)
IRON SATURATION: 43 % (ref 15–50)
IRON: 102 MCG/DL (ref 37–145)
POTASSIUM SERPL-SCNC: 4.1 MMOL/L (ref 3.5–5)
PRO-BNP: 364 PG/ML (ref 0–125)
SODIUM BLD-SCNC: 142 MMOL/L (ref 132–146)
TOTAL IRON BINDING CAPACITY: 239 MCG/DL (ref 250–450)
TRANSFERRIN: 224 MG/DL (ref 200–360)

## 2022-06-15 PROCEDURE — 83880 ASSAY OF NATRIURETIC PEPTIDE: CPT

## 2022-06-15 PROCEDURE — 80048 BASIC METABOLIC PNL TOTAL CA: CPT

## 2022-06-15 PROCEDURE — 99214 OFFICE O/P EST MOD 30 MIN: CPT

## 2022-06-15 PROCEDURE — 36415 COLL VENOUS BLD VENIPUNCTURE: CPT

## 2022-06-15 NOTE — PROGRESS NOTES
Congestive Heart Failure [address]   1960          Referring Provider: Coral Rodriguez  Primary Care Physician: Dr. Hussein Mattson  Cardiologist:   Nephrologist:         History of Present Illness:     Genet Kaplan is a 62 y.o. female with a history of HFrEF, most recent EF 30%  from 5/11/22. Patient Story:    She does not  have dyspnea with exertion, shortness of breath, or decline in overall functional capacity. She does not have orthopnea, PND, nocturnal cough or hemoptysis. She does not have abdominal distention or bloating, early satiety, anorexia/change in appetite. She does have a good urinary response to oral diuretic. She does not have  lower extremity edema. She denies lightheadedness, dizziness. She denies palpitations, syncope or near syncope. She does not complain of chest pain, pressure, discomfort. Allergies   Allergen Reactions    Penicillins Anaphylaxis    Penicillins            Prior to Visit Medications    Medication Sig Taking? Authorizing Provider   sacubitril-valsartan (ENTRESTO) 49-51 MG per tablet Take 1 tablet by mouth 2 times daily  Briana Plateras APRN - CNP   furosemide (LASIX) 40 MG tablet Take 1 tablet by mouth daily as needed (weight gain, shortness of breath, swelling)  [place] Plater, APRN - CNP   spironolactone (ALDACTONE) 25 MG tablet Take 1 tablet by mouth daily  Briana Plater, APRN - CNP   metoprolol succinate (TOPROL XL) 50 MG extended release tablet Take 1 tablet by mouth daily  Pepito Johnson DO   meloxicam (MOBIC) 15 MG tablet Take 15 mg by mouth daily  Historical Provider, MD   fluticasone (FLONASE) 50 MCG/ACT nasal spray 1 spray by Nasal route daily as needed for Rhinitis   Historical Provider, MD           Guideline directed medical:  ARNI/ACE I/ARB: Yes  Beta blocker:   Yes  Aldosterone antagonist:  Yes        Physical Examination:     /63   Pulse 60   Resp 18   Wt 208 lb (94.3 kg)   SpO2 99%   BMI 31.63 kg/m²     Assessment  Charting Type: Shift assessment    Neurological  Level of Consciousness: Alert (0)              Respiratory  Respiratory Pattern: Regular  Respiratory Depth: Normal  Respiratory Quality/Effort: Unlabored  Chest Assessment: Chest expansion asymmetrical  L Breath Sounds: Clear,Diminished  R Breath Sounds: Clear,Diminished              Cardiac  Cardiac Regularity: Regular  Cardiac Rhythm: Sinus rhythm    Rhythm Interpretation  Heart Rate: 60         Gastrointestinal  Abdominal (WDL): Within Defined Limits               Peripheral Vascular  Peripheral Vascular (WDL): Within Defined Limits                                                 Heart Rate: 60                     LAB DATA:    Last 3 BMP      Sodium (mmol/L)   Date Value   06/06/2022 143   06/01/2022 140   05/13/2022 139     Potassium (mmol/L)   Date Value   06/06/2022 4.5   06/01/2022 4.2   05/13/2022 4.3     Chloride (mmol/L)   Date Value   06/06/2022 105   06/01/2022 104   05/13/2022 99     CO2 (mmol/L)   Date Value   06/06/2022 28   06/01/2022 25   05/13/2022 29     BUN (mg/dL)   Date Value   06/06/2022 16   06/01/2022 13   05/13/2022 25 (H)     Glucose (mg/dL)   Date Value   06/06/2022 93   06/01/2022 101 (H)   05/13/2022 147 (H)     Calcium (mg/dL)   Date Value   06/06/2022 9.8   06/01/2022 9.3   05/13/2022 9.8       Last 3 BNP       Pro-BNP (pg/mL)   Date Value   06/06/2022 507 (H)   06/01/2022 614 (H)   05/12/2022 683 (H)          CBC: No results for input(s): WBC, HGB, PLT in the last 72 hours. BMP:  No results for input(s): NA, K, CL, CO2, BUN, CREATININE, GLUCOSE in the last 72 hours. Hepatic: No results for input(s): AST, ALT, ALB, BILITOT, ALKPHOS in the last 72 hours. Troponin: No results for input(s): TROPONINI in the last 72 hours. BNP: No results for input(s): BNP in the last 72 hours. Lipids: No results for input(s): CHOL, HDL in the last 72 hours.     Invalid input(s): LDLCALCU  INR: No results for input(s): INR in the last 72 hours. WEIGHTS:    Wt Readings from Last 3 Encounters:   06/15/22 208 lb (94.3 kg)   06/10/22 207 lb (93.9 kg)   06/01/22 206 lb (93.4 kg)         TELEMETRY:  Cardiac Regularity: Regular  Cardiac Rhythm/Interpretation: NSR        ASSESSMENT:  Mirela Monae is evolemic with stable weights. Interventions completed this visit:  IV diuretics given no  Lab work obtained no, patient sent to lab for viral studies ordered last week. (BMP/BNP included)     Reviewed currently prescribed medications with patient, educated on importance of compliance and answered any questions regarding their medication  Educated on signs and symptoms of HF  Educated on low sodium diet    PLAN:  Scheduled to follow up in CHF clinic on   Future Appointments   Date Time Provider Shaun Tillman   6/23/2022 10:30 AM [facility]F ROOM 1 [facility]   8/12/2022  9:00 AM Renu Almodovar MD [address] [place] [address]     Given clinic phone number and aware of signs and symptoms to call with any HF change in symptoms. * Patient has not increased dose of Entresto to middle dose. [facility] verified that patient did  49/51 dose on 6-11-22. Instructed her to start increase today. Demonstrates understanding.

## 2022-06-16 ENCOUNTER — HOSPITAL ENCOUNTER (OUTPATIENT)
Age: 62
Discharge: HOME | End: 2022-06-16
Payer: COMMERCIAL

## 2022-06-16 PROCEDURE — 84166 PROTEIN E-PHORESIS/URINE/CSF: CPT

## 2022-06-17 LAB
ADDENDUM ELECTROPHORESIS URINE RANDOM: NORMAL
ADDENDUM ELECTROPHORESIS URINE RANDOM: NORMAL
ALBUMIN SERPL-MCNC: 3.1 G/DL (ref 3.5–4.7)
ALPHA-1-GLOBULIN: 0.2 G/DL (ref 0.2–0.4)
ALPHA-2-GLOBULIN: 1 G/DL (ref 0.5–1)
BETA GLOBULIN: 1.1 G/DL (ref 0.8–1.3)
ELECTROPHORESIS: ABNORMAL
GAMMA GLOBULIN: 1.8 G/DL (ref 0.7–1.6)
IMMUNOFIXATION RESULT, SERUM: NORMAL
IMMUNOFIXATION URINE: NORMAL
TOTAL PROTEIN: 7.2 G/DL (ref 6.4–8.3)

## 2022-06-19 LAB
PARVOVIRUS B19 IGG ANTIBODY: 7.38 IV
PARVOVIRUS B19 IGM ANTIBODY: 0.12 IV

## 2022-06-21 LAB — EPSTEIN-BARR VCA IGM: <10 U/ML (ref 0–43.9)

## 2022-06-22 LAB
CYTOMEGALOVIRUS IGG ANTIBODY: NORMAL
CYTOMEGALOVIRUS IGM ANTIBODY: NORMAL

## 2022-06-23 ENCOUNTER — TELEPHONE (OUTPATIENT)
Dept: CARDIOLOGY CLINIC | Age: 62
End: 2022-06-23

## 2022-06-23 ENCOUNTER — HOSPITAL ENCOUNTER (OUTPATIENT)
Dept: OTHER | Age: 62
Setting detail: THERAPIES SERIES
Discharge: HOME | End: 2022-06-23
Payer: COMMERCIAL

## 2022-06-23 VITALS
SYSTOLIC BLOOD PRESSURE: 123 MMHG | DIASTOLIC BLOOD PRESSURE: 79 MMHG | OXYGEN SATURATION: 98 % | BODY MASS INDEX: 32.23 KG/M2 | HEART RATE: 56 BPM | WEIGHT: 212 LBS | RESPIRATION RATE: 18 BRPM

## 2022-06-23 DIAGNOSIS — Z79.899 NEW MEDICATION ADDED: ICD-10-CM

## 2022-06-23 DIAGNOSIS — I50.20 HFREF (HEART FAILURE WITH REDUCED EJECTION FRACTION) (HCC): Primary | ICD-10-CM

## 2022-06-23 LAB
ANION GAP SERPL CALCULATED.3IONS-SCNC: 9 MMOL/L (ref 7–16)
BUN BLDV-MCNC: 11 MG/DL (ref 6–23)
CALCIUM SERPL-MCNC: 8.8 MG/DL (ref 8.6–10.2)
CHLORIDE BLD-SCNC: 107 MMOL/L (ref 98–107)
CO2: 24 MMOL/L (ref 22–29)
CREAT SERPL-MCNC: 0.8 MG/DL (ref 0.5–1)
GFR AFRICAN AMERICAN: >60
GFR NON-AFRICAN AMERICAN: >60 ML/MIN/1.73
GLUCOSE BLD-MCNC: 85 MG/DL (ref 74–99)
POTASSIUM SERPL-SCNC: 3.7 MMOL/L (ref 3.5–5)
PRO-BNP: 1212 PG/ML (ref 0–125)
SODIUM BLD-SCNC: 140 MMOL/L (ref 132–146)

## 2022-06-23 PROCEDURE — 99214 OFFICE O/P EST MOD 30 MIN: CPT

## 2022-06-23 PROCEDURE — 80048 BASIC METABOLIC PNL TOTAL CA: CPT

## 2022-06-23 PROCEDURE — 83880 ASSAY OF NATRIURETIC PEPTIDE: CPT

## 2022-06-23 PROCEDURE — 36415 COLL VENOUS BLD VENIPUNCTURE: CPT

## 2022-06-23 NOTE — RESULT ENCOUNTER NOTE
Labs and CHF clinic note reviewed  Vitals: /79   Pulse 56    Current GDMT:  Entresto 49/51 mg BID  Toprol 50 mg daily (limited titration secondary to HR)  Spironolactone 25 mg daily   Lasix 40 mg daily PRN    Please have her start SGLT2i Farxiga vs Jardiance 10 mg daily   Follow up labs 1 week after starting not returning to CHF clinic until 7/5    Thank you

## 2022-06-23 NOTE — TELEPHONE ENCOUNTER
----- Message from MEHDI Ledesma - CNP sent at 6/23/2022  1:38 PM EDT -----  Labs and CHF clinic note reviewed  Vitals: /79   Pulse 56    Current GDMT:  Entresto 49/51 mg BID  Toprol 50 mg daily (limited titration secondary to HR)  Spironolactone 25 mg daily   Lasix 40 mg daily PRN    Please have her start SGLT2i Farxiga vs Jardiance 10 mg daily   Follow up labs 1 week after starting not returning to CHF clinic until 7/5    Thank you

## 2022-06-23 NOTE — TELEPHONE ENCOUNTER
I have reviewed the provider's instructions with the patient, answering all questions to her satisfaction. She will go to [place] cardiology for samples of farxiga tomorrow 10mg daily  . (clive ACÑUA gets approved ) if still too expensive she will call office to enroll in [employer] rx assistance for farxiga. She will go next Friday for labs after her hair apt.

## 2022-06-25 LAB
COXSACKIE B1 ANTIBODY: NORMAL
COXSACKIE B2 ANTIBODY: NORMAL
COXSACKIE B3 ANTIBODY: NORMAL
COXSACKIE B4 ANTIBODY: NORMAL
COXSACKIE B5 ANTIBODY: NORMAL
COXSACKIE B6 ANTIBODY: NORMAL

## 2022-07-05 ENCOUNTER — HOSPITAL ENCOUNTER (OUTPATIENT)
Dept: OTHER | Age: 62
Setting detail: THERAPIES SERIES
Discharge: HOME | End: 2022-07-05
Payer: COMMERCIAL

## 2022-07-05 VITALS
DIASTOLIC BLOOD PRESSURE: 80 MMHG | BODY MASS INDEX: 31.17 KG/M2 | WEIGHT: 205 LBS | RESPIRATION RATE: 18 BRPM | SYSTOLIC BLOOD PRESSURE: 112 MMHG | HEART RATE: 64 BPM

## 2022-07-05 DIAGNOSIS — I50.20 HFREF (HEART FAILURE WITH REDUCED EJECTION FRACTION) (HCC): ICD-10-CM

## 2022-07-05 DIAGNOSIS — Z79.899 NEW MEDICATION ADDED: ICD-10-CM

## 2022-07-05 LAB
ANION GAP SERPL CALCULATED.3IONS-SCNC: 11 MMOL/L (ref 7–16)
BUN BLDV-MCNC: 16 MG/DL (ref 6–23)
CALCIUM SERPL-MCNC: 9.2 MG/DL (ref 8.6–10.2)
CHLORIDE BLD-SCNC: 108 MMOL/L (ref 98–107)
CO2: 21 MMOL/L (ref 22–29)
CREAT SERPL-MCNC: 1 MG/DL (ref 0.5–1)
GFR AFRICAN AMERICAN: >60
GFR NON-AFRICAN AMERICAN: >60 ML/MIN/1.73
GLUCOSE BLD-MCNC: 99 MG/DL (ref 74–99)
POTASSIUM SERPL-SCNC: 3.9 MMOL/L (ref 3.5–5)
PRO-BNP: 299 PG/ML (ref 0–125)
SODIUM BLD-SCNC: 140 MMOL/L (ref 132–146)

## 2022-07-05 PROCEDURE — 36415 COLL VENOUS BLD VENIPUNCTURE: CPT

## 2022-07-05 PROCEDURE — 80048 BASIC METABOLIC PNL TOTAL CA: CPT

## 2022-07-05 PROCEDURE — 83880 ASSAY OF NATRIURETIC PEPTIDE: CPT

## 2022-07-05 PROCEDURE — 99214 OFFICE O/P EST MOD 30 MIN: CPT

## 2022-07-05 NOTE — RESULT ENCOUNTER NOTE
Labs and CHF clinic note reviewed  Vitals: /80   Pulse 64      Current GDMT:  Entresto 49/51 mg BID  Toprol 50 mg daily (limited titration secondary to HR)  Spironolactone 25 mg daily   Farxiga 10 mg daily   Lasix 40 mg daily PRN     Prior auth received for [place] on 7/5/2022 - did she just start the medication?   Therefore no titration   Follow up labs at scheduled CHF clinic

## 2022-07-05 NOTE — TELEPHONE ENCOUNTER
Farxiga:  Prior auth approved. Authorized from June 23, 2022 to June 23, 2023   Case ID: Tierney Guy supported: No   Note from payer: [place] Case: [id number], Status: Approved, Coverage Starts on: 6/23/2022 12:00:00 AM, Coverage Ends on: 6/23/2023 12:00:00 AM.   Payer:  [place] [employer] and Joey Lazo of [address] [place] [address], they will fill farxiga. If any problems will call card office.

## 2022-07-05 NOTE — PROGRESS NOTES
Congestive Heart Failure [address]   1960          Referring Provider: Maggy AHRDING-CNP  Primary Care Physician: Dr. Naomy Koehler  Cardiologist: Dr. Elena Cool  Nephrologist:         History of Present Illness:     Fatou Waldron is a 62 y.o. female with a history of HFrEF, most recent EF 30%  from 5/11/22. Patient Story:    She does not have dyspnea with exertion, shortness of breath, or decline in overall functional capacity. She does not have orthopnea, PND, nocturnal cough or hemoptysis. She does not have abdominal distention or bloating, early satiety, anorexia/change in appetite. She does have a good urinary response to oral diuretic. She does not have lower extremity edema. She denies lightheadedness, dizziness. She denies palpitations, syncope or near syncope. She does not complain of chest pain, pressure, discomfort. Allergies   Allergen Reactions    Penicillins Anaphylaxis    Penicillins            Prior to Visit Medications    Medication Sig Taking?  Authorizing Provider   dapagliflozin (FARXIGA) 10 MG tablet Take 1 tablet by mouth every morning  MEHDI Toussaint CNP   sacubitril-valsartan (ENTRESTO) 49-51 MG per tablet Take 1 tablet by mouth 2 times daily  MEHDI Toussaint CNP   furosemide (LASIX) 40 MG tablet Take 1 tablet by mouth daily as needed (weight gain, shortness of breath, swelling)  MEHDI Toussaint CNP   spironolactone (ALDACTONE) 25 MG tablet Take 1 tablet by mouth daily  MEHDI Toussaint CNP   metoprolol succinate (TOPROL XL) 50 MG extended release tablet Take 1 tablet by mouth daily  Pepito Pacheco DO   meloxicam (MOBIC) 15 MG tablet Take 15 mg by mouth daily  Historical Provider, MD   fluticasone (FLONASE) 50 MCG/ACT nasal spray 1 spray by Nasal route daily as needed for Rhinitis   Patient not taking: Reported on 7/5/2022  Historical Provider, MD           Guideline directed medical:  ARNI/ACE I/ARB: Yes  Beta blocker: Yes  Aldosterone antagonist:  Yes        Physical Examination:     /80   Pulse 64   Resp 18   Wt 205 lb (93 kg)   BMI 31.17 kg/m²     Assessment  Charting Type: Shift assessment (chf)    Neurological  Level of Consciousness: Alert (0)              Respiratory  Respiratory Pattern: Regular  Respiratory Depth: Normal  Respiratory Quality/Effort: Unlabored  Chest Assessment: Chest expansion symmetrical  L Breath Sounds: Clear,Bilateral  R Breath Sounds: Clear,Bilateral              Cardiac  Cardiac Regularity: Regular  Cardiac Rhythm: Sinus rhythm,Sinus rhythm with PVC    Rhythm Interpretation  Heart Rate: 64         Gastrointestinal  Abdominal (WDL): Within Defined Limits               Peripheral Vascular  Peripheral Vascular (WDL): Within Defined Limits                                                 Heart Rate: 64                     LAB DATA:    Last 3 BMP      Sodium (mmol/L)   Date Value   07/05/2022 140   06/23/2022 140   06/15/2022 142     Potassium (mmol/L)   Date Value   07/05/2022 3.9   06/23/2022 3.7   06/15/2022 4.1     Chloride (mmol/L)   Date Value   07/05/2022 108 (H)   06/23/2022 107   06/15/2022 105     CO2 (mmol/L)   Date Value   07/05/2022 21 (L)   06/23/2022 24   06/15/2022 26     BUN (mg/dL)   Date Value   07/05/2022 16   06/23/2022 11   06/15/2022 18     Glucose (mg/dL)   Date Value   07/05/2022 99   06/23/2022 85   06/15/2022 98     Calcium (mg/dL)   Date Value   07/05/2022 9.2   06/23/2022 8.8   06/15/2022 9.3       Last 3 BNP       Pro-BNP (pg/mL)   Date Value   07/05/2022 299 (H)   06/23/2022 1,212 (H)   06/15/2022 364 (H)          CBC: No results for input(s): WBC, HGB, PLT in the last 72 hours. BMP:    Recent Labs     07/05/22  0830      K 3.9   *   CO2 21*   BUN 16   CREATININE 1.0   GLUCOSE 99     Hepatic: No results for input(s): AST, ALT, ALB, BILITOT, ALKPHOS in the last 72 hours.   Troponin: No results for input(s): TROPONINI in the last 72 hours. BNP: No results for input(s): BNP in the last 72 hours. Lipids: No results for input(s): CHOL, HDL in the last 72 hours. Invalid input(s): LDLCALCU  INR: No results for input(s): INR in the last 72 hours. WEIGHTS:    Wt Readings from Last 3 Encounters:   07/05/22 205 lb (93 kg)   06/23/22 212 lb (96.2 kg)   06/15/22 208 lb (94.3 kg)         TELEMETRY:  Cardiac Regularity: Regular  Cardiac Rhythm/Interpretation: SR        ASSESSMENT:  Aracelis Owusu presented for CHF clinic follow up. 5 lb weight loss since last visit. 3 weeks follow up. Interventions completed this visit:  IV diuretics given: No  Lab work obtained: Yes , BMP/BNP    Reviewed currently prescribed medications with patient, educated on importance of compliance and answered any questions regarding their medication  Educated on signs and symptoms of HF  Educated on low sodium diet    PLAN:  Scheduled to follow up in CHF clinic on   Future Appointments   Date Time Provider Shaun Tillman   7/27/2022  9:30 AM [facility] CHF ROOM 1 [facility]   8/12/2022  9:00 AM Cece Cardona  [facility]     Given clinic phone number and aware of signs and symptoms to call with any HF change in symptoms.

## 2022-07-06 ENCOUNTER — HOSPITAL ENCOUNTER (EMERGENCY)
Age: 62
Discharge: HOME | End: 2022-07-06
Payer: COMMERCIAL

## 2022-07-06 VITALS
BODY MASS INDEX: 31.07 KG/M2 | SYSTOLIC BLOOD PRESSURE: 123 MMHG | HEIGHT: 68 IN | TEMPERATURE: 98.3 F | OXYGEN SATURATION: 98 % | RESPIRATION RATE: 17 BRPM | WEIGHT: 205 LBS | DIASTOLIC BLOOD PRESSURE: 76 MMHG | HEART RATE: 68 BPM

## 2022-07-06 DIAGNOSIS — K04.7 DENTAL ABSCESS: Primary | ICD-10-CM

## 2022-07-06 PROCEDURE — 6370000000 HC RX 637 (ALT 250 FOR IP): Performed by: NURSE PRACTITIONER

## 2022-07-06 PROCEDURE — 99283 EMERGENCY DEPT VISIT LOW MDM: CPT

## 2022-07-06 RX ORDER — OXYCODONE HYDROCHLORIDE AND ACETAMINOPHEN 5; 325 MG/1; MG/1
1 TABLET ORAL ONCE
Status: COMPLETED | OUTPATIENT
Start: 2022-07-06 | End: 2022-07-06

## 2022-07-06 RX ORDER — CLINDAMYCIN HYDROCHLORIDE 300 MG/1
300 CAPSULE ORAL 3 TIMES DAILY
Qty: 30 CAPSULE | Refills: 0 | Status: SHIPPED | OUTPATIENT
Start: 2022-07-06 | End: 2022-07-16

## 2022-07-06 RX ORDER — ACETAMINOPHEN 500 MG
1000 TABLET ORAL EVERY 6 HOURS PRN
Qty: 120 TABLET | Refills: 0 | Status: SHIPPED | OUTPATIENT
Start: 2022-07-06

## 2022-07-06 RX ORDER — IBUPROFEN 600 MG/1
600 TABLET ORAL EVERY 8 HOURS PRN
Qty: 30 TABLET | Refills: 0 | Status: SHIPPED | OUTPATIENT
Start: 2022-07-06 | End: 2022-08-12

## 2022-07-06 RX ORDER — CLINDAMYCIN HYDROCHLORIDE 150 MG/1
300 CAPSULE ORAL ONCE
Status: COMPLETED | OUTPATIENT
Start: 2022-07-06 | End: 2022-07-06

## 2022-07-06 RX ORDER — LIDOCAINE HYDROCHLORIDE 20 MG/ML
15 SOLUTION OROPHARYNGEAL ONCE
Status: COMPLETED | OUTPATIENT
Start: 2022-07-06 | End: 2022-07-06

## 2022-07-06 RX ORDER — LIDOCAINE HYDROCHLORIDE 20 MG/ML
15 SOLUTION OROPHARYNGEAL PRN
Qty: 120 ML | Refills: 0 | Status: SHIPPED | OUTPATIENT
Start: 2022-07-06 | End: 2022-08-12

## 2022-07-06 RX ADMIN — CLINDAMYCIN HYDROCHLORIDE 300 MG: 150 CAPSULE ORAL at 11:55

## 2022-07-06 RX ADMIN — OXYCODONE AND ACETAMINOPHEN 1 TABLET: 5; 325 TABLET ORAL at 11:55

## 2022-07-06 RX ADMIN — LIDOCAINE HYDROCHLORIDE 15 ML: 20 SOLUTION ORAL at 11:55

## 2022-07-06 ASSESSMENT — PAIN DESCRIPTION - ORIENTATION: ORIENTATION: LEFT

## 2022-07-06 ASSESSMENT — PAIN DESCRIPTION - LOCATION: LOCATION: MOUTH

## 2022-07-06 ASSESSMENT — PAIN SCALES - GENERAL: PAINLEVEL_OUTOF10: 9

## 2022-07-06 ASSESSMENT — PAIN DESCRIPTION - DESCRIPTORS: DESCRIPTORS: SORE

## 2022-07-06 NOTE — ED PROVIDER NOTES
[address]  Department of Emergency Medicine   ED  Encounter Note  Admit Date/RoomTime: 2022 11:31 AM  ED Room: [place] [address]    NAME: Nancy De La Garza  : 1960  MRN: [id number]     Chief Complaint:  Facial Swelling (L side facial swelling, started yesterday)    History of Present Illness        Nancy De La Garza is a 62 y.o. old female who presents to the emergency department by private vehicle, for non-traumatic left upper gum pain, which occured 1 day(s) prior to arrival.  Since onset the symptoms have been stable and mild-moderate in severity. Worsened by  hot liquids, cold liquids and chewing and improved by nothing tried. Associated Signs & Symptoms:  facial swelling left. Onset:       Spontaneous:   yes. Following Trauma:   no.     Previous Caries:   yes. Recent Dental Procedure:   no.     ROS   Pertinent positives and negatives are stated within HPI, all other systems reviewed and are negative. Past Medical History:  has a past medical history of Arthritis and Hypertension. Surgical History:  has a past surgical history that includes Dilation and curettage of uterus; Tonsillectomy; Breast enhancement surgery; and hernia repair. Social History:  reports that she has never smoked. She has never used smokeless tobacco. She reports current alcohol use. She reports that she does not use drugs. Family History: family history is not on file. Allergies: Penicillins and Penicillins    Physical Exam   Oxygen Saturation Interpretation: Normal.        ED Triage Vitals   BP Temp Temp Source Heart Rate Resp SpO2 Height Weight   22 1129 22 1059 22 1059 22 1059 22 1129 22 1059 22 1129 22 1129   123/76 98.3 °F (36.8 °C) Oral 68 17 98 % 5' 8\" (1.727 m) 205 lb (93 kg)         · Constitutional:  Alert, development consistent with age. · HEENT:  NC/NT. Airway patent. · Neck:  Supple.  Normal counseling regarding the diagnosis and prognosis. Questions are answered at this time and are agreeable with the plan. Assessment      1. Dental abscess      Plan   Discharged home. Patient condition is good    New Medications     Discharge Medication List as of 7/6/2022 11:50 AM      START taking these medications    Details   clindamycin (CLEOCIN) 300 MG capsule Take 1 capsule by mouth 3 times daily for 10 days, Disp-30 capsule, R-0Print      lidocaine viscous hcl (XYLOCAINE) 2 % SOLN solution Take 15 mLs by mouth as needed for Irritation, Disp-120 mL, R-0Print      acetaminophen (TYLENOL) 500 MG tablet Take 2 tablets by mouth every 6 hours as needed for Pain or Fever Maximum dose- 8 tablets/24 hours. , Disp-120 tablet, R-0Print      ibuprofen (IBU) 600 MG tablet Take 1 tablet by mouth every 8 hours as needed for Pain Take with food. , Disp-30 tablet, R-0Print           Electronically signed by MEHDI Gonzalez CNP   DD: 7/6/22  **This report was transcribed using voice recognition software. Every effort was made to ensure accuracy; however, inadvertent computerized transcription errors may be present.   END OF ED PROVIDER NOTE      MEHDI Villa CNP  07/06/22 [id number]

## 2022-07-07 ENCOUNTER — TELEPHONE (OUTPATIENT)
Dept: CARDIOLOGY CLINIC | Age: 62
End: 2022-07-07

## 2022-07-07 DIAGNOSIS — I50.20 HFREF (HEART FAILURE WITH REDUCED EJECTION FRACTION) (HCC): Primary | ICD-10-CM

## 2022-07-07 DIAGNOSIS — Z79.899 MEDICATION DOSE INCREASED: ICD-10-CM

## 2022-07-07 NOTE — TELEPHONE ENCOUNTER
----- Message from MEHDI Richards - CNP sent at 7/7/2022 12:15 PM EDT -----  Thank you, since she has been sampled on farxiga and it is not a new start  Increase Entresto to high dose  Follow up labs in 1 week

## 2022-07-07 NOTE — TELEPHONE ENCOUNTER
Instructions left in VM . Will also mail med dose change script and lab scripts.    1 Yeni Dozier RN

## 2022-07-07 NOTE — RESULT ENCOUNTER NOTE
Thank you, since she has been sampled on farxiga and it is not a new start  Increase Entresto to high dose  Follow up labs in 1 week

## 2022-07-08 NOTE — TELEPHONE ENCOUNTER
I have reviewed the provider's instructions with the patient, answering all questions to her satisfaction. She has a lot of the 49-51mg tabs at home so she will use 2 tabs twice daily till gone, then fill the new script 97-103mg bid and take 1 tab twice daily. Aware lab scripts coming in mail. Will go next Friday.     Maral Lazar RN

## 2022-07-27 ENCOUNTER — HOSPITAL ENCOUNTER (OUTPATIENT)
Dept: OTHER | Age: 62
Setting detail: THERAPIES SERIES
Discharge: HOME | End: 2022-07-27
Payer: COMMERCIAL

## 2022-07-27 VITALS
DIASTOLIC BLOOD PRESSURE: 79 MMHG | HEART RATE: 60 BPM | OXYGEN SATURATION: 99 % | RESPIRATION RATE: 18 BRPM | WEIGHT: 207 LBS | BODY MASS INDEX: 31.47 KG/M2 | SYSTOLIC BLOOD PRESSURE: 113 MMHG

## 2022-07-27 LAB
ANION GAP SERPL CALCULATED.3IONS-SCNC: 11 MMOL/L (ref 7–16)
BUN BLDV-MCNC: 14 MG/DL (ref 6–23)
CALCIUM SERPL-MCNC: 9.3 MG/DL (ref 8.6–10.2)
CHLORIDE BLD-SCNC: 103 MMOL/L (ref 98–107)
CO2: 26 MMOL/L (ref 22–29)
CREAT SERPL-MCNC: 0.9 MG/DL (ref 0.5–1)
GFR AFRICAN AMERICAN: >60
GFR NON-AFRICAN AMERICAN: >60 ML/MIN/1.73
GLUCOSE BLD-MCNC: 88 MG/DL (ref 74–99)
POTASSIUM SERPL-SCNC: 4.1 MMOL/L (ref 3.5–5)
PRO-BNP: 414 PG/ML (ref 0–125)
SODIUM BLD-SCNC: 140 MMOL/L (ref 132–146)

## 2022-07-27 PROCEDURE — 80048 BASIC METABOLIC PNL TOTAL CA: CPT

## 2022-07-27 PROCEDURE — 36415 COLL VENOUS BLD VENIPUNCTURE: CPT

## 2022-07-27 PROCEDURE — 99214 OFFICE O/P EST MOD 30 MIN: CPT

## 2022-07-27 PROCEDURE — 83880 ASSAY OF NATRIURETIC PEPTIDE: CPT

## 2022-07-27 NOTE — PROGRESS NOTES
Congestive Heart Failure [address] [facility]   1960          Referring Provider: Kade ANDERSON  Primary Care Physician: Dr. Letty Kramer  Cardiologist: Dr. Edy Leach  Nephrologist:         History of Present Illness:     Debbi Le is a 62 y.o. female with a history of HFrEF, most recent EF 30%  from 5/11/22. Patient Story:    She does not have dyspnea with exertion, shortness of breath, or decline in overall functional capacity. She does not have orthopnea, PND, nocturnal cough or hemoptysis. She does not have abdominal distention or bloating, early satiety, anorexia/change in appetite. She does have a good urinary response to oral diuretic when she takes it, ordered prn, last time she took dose was at least 2 weeks ago. . She does not have lower extremity edema. She denies lightheadedness, dizziness. She denies palpitations, syncope or near syncope. She does not complain of chest pain, pressure, discomfort. Allergies   Allergen Reactions    Penicillins Anaphylaxis    Penicillins            Prior to Visit Medications    Medication Sig Taking? Authorizing Provider   sacubitril-valsartan (ENTRESTO)  MG per tablet Take 1 tablet by mouth 2 times daily  Costella Mohs, APRN - CNP   lidocaine viscous hcl (XYLOCAINE) 2 % SOLN solution Take 15 mLs by mouth as needed for Irritation  Margie MEHDI Lemon CNP   acetaminophen (TYLENOL) 500 MG tablet Take 2 tablets by mouth every 6 hours as needed for Pain or Fever Maximum dose- 8 tablets/24 hours. MEHDI Traore CNP   ibuprofen (IBU) 600 MG tablet Take 1 tablet by mouth every 8 hours as needed for Pain Take with food.   MEHDI Traore CNP   dapagliflozin (FARXIGA) 10 MG tablet Take 1 tablet by mouth every morning  Costella Mohs, APRN - CNP   furosemide (LASIX) 40 MG tablet Take 1 tablet by mouth daily as needed (weight gain, shortness of breath, swelling)  MEHDI Waller CNP   spironolactone (ALDACTONE) 25 MG tablet Take 1 tablet by mouth daily  MEHDI Waller CNP   metoprolol succinate (TOPROL XL) 50 MG extended release tablet Take 1 tablet by mouth daily  Pepito Guzman DO   meloxicam (MOBIC) 15 MG tablet Take 15 mg by mouth daily  Historical Provider, MD   fluticasone (FLONASE) 50 MCG/ACT nasal spray 1 spray by Nasal route daily as needed for Rhinitis   Patient not taking: No sig reported  Historical Provider, MD           Guideline directed medical:  ARNI/ACE I/ARB: Yes  Beta blocker:   Yes  Aldosterone antagonist:  Yes        Physical Examination:     /79   Pulse 60   Resp 18   Wt 207 lb (93.9 kg)   SpO2 99%   BMI 31.47 kg/m²     Assessment  Charting Type: Shift assessment (chf)    Neurological  Level of Consciousness: Alert (0)              Respiratory  Respiratory Pattern: Regular  Respiratory Depth: Normal  Respiratory Quality/Effort: Unlabored  Chest Assessment: Chest expansion symmetrical  L Breath Sounds: Clear, Bilateral  R Breath Sounds: Clear, Bilateral              Cardiac  Cardiac Regularity: Regular  Cardiac Rhythm: Sinus rhythm, Sinus rhythm with PVC    Rhythm Interpretation  Heart Rate: 60         Gastrointestinal  Abdominal (WDL): Within Defined Limits               Peripheral Vascular  Peripheral Vascular (WDL): Within Defined Limits  Edema: None                                                 Heart Rate: 60                     LAB DATA:    Last 3 BMP      Sodium (mmol/L)   Date Value   07/05/2022 140   06/23/2022 140   06/15/2022 142     Potassium (mmol/L)   Date Value   07/05/2022 3.9   06/23/2022 3.7   06/15/2022 4.1     Chloride (mmol/L)   Date Value   07/05/2022 108 (H)   06/23/2022 107   06/15/2022 105     CO2 (mmol/L)   Date Value   07/05/2022 21 (L)   06/23/2022 24   06/15/2022 26     BUN (mg/dL)   Date Value   07/05/2022 16   06/23/2022 11   06/15/2022 18     Glucose (mg/dL)   Date Value 07/05/2022 99   06/23/2022 85   06/15/2022 98     Calcium (mg/dL)   Date Value   07/05/2022 9.2   06/23/2022 8.8   06/15/2022 9.3       Last 3 BNP       Pro-BNP (pg/mL)   Date Value   07/05/2022 299 (H)   06/23/2022 1,212 (H)   06/15/2022 364 (H)          CBC: No results for input(s): WBC, HGB, PLT in the last 72 hours. BMP:    No results for input(s): NA, K, CL, CO2, BUN, CREATININE, GLUCOSE in the last 72 hours. Hepatic: No results for input(s): AST, ALT, ALB, BILITOT, ALKPHOS in the last 72 hours. Troponin: No results for input(s): TROPONINI in the last 72 hours. BNP: No results for input(s): BNP in the last 72 hours. Lipids: No results for input(s): CHOL, HDL in the last 72 hours. Invalid input(s): LDLCALCU  INR: No results for input(s): INR in the last 72 hours. WEIGHTS:    Wt Readings from Last 3 Encounters:   07/27/22 207 lb (93.9 kg)   07/06/22 205 lb (93 kg)   07/05/22 205 lb (93 kg)         TELEMETRY:  Cardiac Regularity: Regular  Cardiac Rhythm/Interpretation: SR        ASSESSMENT:  Brody Aponte presented for CHF clinic follow up. 2 lb weight loss since last visit. Interventions completed this visit:  IV diuretics given: No  Lab work obtained: Yes , BMP/BNP    Reviewed currently prescribed medications with patient, educated on importance of compliance and answered any questions regarding their medication  Educated on signs and symptoms of HF  Educated on low sodium diet    PLAN:  Scheduled to follow up in CHF clinic on   Future Appointments   Date Time Provider Shaun Tillman   8/12/2022  9:00 AM Riana Arauz MD [address] [place] [address]   8/24/2022  9:45 AM [facility] CHF ROOM 1 [address]     Given clinic phone number and aware of signs and symptoms to call with any HF change in symptoms. Pt denies any discomfort, pt admits ENTRESTO WAS INCREASED TO HIGH DOSE ABOUT 2 WEEKS AGO AND PT DENIES ANY DIZZINESS OR DISCOMFORT. PT WILL CALL FOR EARLIER APPT.  IF NEEDED.

## 2022-08-12 ENCOUNTER — OFFICE VISIT (OUTPATIENT)
Dept: CARDIOLOGY CLINIC | Age: 62
End: 2022-08-12
Payer: COMMERCIAL

## 2022-08-12 VITALS
SYSTOLIC BLOOD PRESSURE: 110 MMHG | WEIGHT: 211.4 LBS | BODY MASS INDEX: 32.04 KG/M2 | HEIGHT: 68 IN | DIASTOLIC BLOOD PRESSURE: 80 MMHG | RESPIRATION RATE: 14 BRPM | HEART RATE: 50 BPM

## 2022-08-12 DIAGNOSIS — I42.0 DILATED CARDIOMYOPATHY (HCC): ICD-10-CM

## 2022-08-12 DIAGNOSIS — I50.20 HFREF (HEART FAILURE WITH REDUCED EJECTION FRACTION) (HCC): Primary | ICD-10-CM

## 2022-08-12 PROCEDURE — 99214 OFFICE O/P EST MOD 30 MIN: CPT | Performed by: INTERNAL MEDICINE

## 2022-08-12 PROCEDURE — 93000 ELECTROCARDIOGRAM COMPLETE: CPT | Performed by: INTERNAL MEDICINE

## 2022-08-12 NOTE — PROGRESS NOTES
OFFICE VISIT     PRIMARY CARE PHYSICIAN:      Tessie Decker MD       ALLERGIES / SENSITIVITIES:        Allergies   Allergen Reactions    Penicillins Anaphylaxis    Penicillins           REVIEWED MEDICATIONS:        Current Outpatient Medications:     sacubitril-valsartan (ENTRESTO)  MG per tablet, Take 1 tablet by mouth 2 times daily, Disp: 60 tablet, Rfl: 3    acetaminophen (TYLENOL) 500 MG tablet, Take 2 tablets by mouth every 6 hours as needed for Pain or Fever Maximum dose- 8 tablets/24 hours. , Disp: 120 tablet, Rfl: 0    dapagliflozin (FARXIGA) 10 MG tablet, Take 1 tablet by mouth every morning, Disp: 90 tablet, Rfl: 1    furosemide (LASIX) 40 MG tablet, Take 1 tablet by mouth daily as needed (weight gain, shortness of breath, swelling), Disp: 30 tablet, Rfl: 1    spironolactone (ALDACTONE) 25 MG tablet, Take 1 tablet by mouth daily, Disp: 90 tablet, Rfl: 1    metoprolol succinate (TOPROL XL) 50 MG extended release tablet, Take 1 tablet by mouth daily, Disp: 30 tablet, Rfl: 3    meloxicam (MOBIC) 15 MG tablet, Take 15 mg by mouth daily, Disp: , Rfl:     fluticasone (FLONASE) 50 MCG/ACT nasal spray, 1 spray by Nasal route daily as needed for Rhinitis, Disp: , Rfl:       S: REASON FOR VISIT:       Chief Complaint   Patient presents with    Congestive Heart Failure     2 mo OV. Denies cardiac complaints. Is wearing lifevest-- no shocks. History of Present Illness:       Office Visit for follow up of CHF, CMP   62year old with new CMP, CHF came for f/u visit   Seen by our INDER - Lauro Carnes on 6/10/22 - Entresto increased 6/10/22   Seen at CHF clinic   No hospitalizations or surgeries since last visit   Patient is compliant with all medications   Arnaldo any exertional chest pain or short of breath   No palpitations, dizzy or syncope.    Active at home   Try to watch diet          Past Medical History:   Diagnosis Date    Arthritis     CHF (congestive heart failure) (HCC)     Hypertension             Past Surgical History:   Procedure Laterality Date    BREAST ENHANCEMENT SURGERY      DILATION AND CURETTAGE OF UTERUS      HERNIA REPAIR      TONSILLECTOMY            Family History   Problem Relation Age of Onset    Heart Failure Mother           Social History     Tobacco Use    Smoking status: Never    Smokeless tobacco: Never   Substance Use Topics    Alcohol use: Yes     Comment: social         Review of Systems:    Constitutional: negative for fever and chills, or significant weight loss  HEENT: negative for acute visual symptoms or auditory problems, no dysphagia  Respiratory: negative for cough, wheezing, or hemoptysis  Cardiovascular: negative for chest pain, palpitations, and dyspnea  Gastrointestinal: negative for abdominal pain, diarrhea, melena, nausea and vomiting  Endocrine: Negative for polyuria and polydyspsia  Genitourinary: negative for dysuria and hematuria  Derm: negative for rash and skin lesion(s)  Neurological: negative for tingling, numbness, weakness, seizures  Endocrine: negative for polydipsia and polyuria  Musculoskeletal: negative for pain or tenderness  Psychiatric: negative for anxiety, depression, or suicidal ideations         O:  COMPLETE PHYSICAL EXAM:       /80 (Site: Left Upper Arm, Position: Sitting, Cuff Size: Medium Adult)   Pulse 50   Resp 14   Ht 5' 8\" (1.727 m)   Wt 211 lb 6.4 oz (95.9 kg)   BMI 32.14 kg/m²       General:   Patient alert, comfortable, no distress. Appears stated age. HEENT:    Pupils equal, no icterus; Tongue moist.   Neck:              No masses, Thyroid not palpable. No elevated JVD, No carotid bruit. Chest:   Normal configuration, non tender. Lungs:   Clear to auscultation bilaterally except few scattered rhonchi. Cardiovascular:  Regular rhythm, 1/6 systolic murmur, No S3, no palpable thrills. Abdomen:  Soft, Bowel sounds normal, no pulsatile abdominal aorta, no palpable masses. Extremities:  No edema. Distal pulses palpable.  No Low cholesterol diet, regular exercise as tolerate, and gradual weight loss discussed. Above recommendations discussed with her   The patient's current medication list, allergies, problem list and results of prior tests (as available) were reviewed at today's visit   All questions answered about cardiac diagnoses and cardiac medications. Continue current medications. Monitor BP and heart rates. Compliance with medications and f/u with all physicians discussed. Risk factor modification based on risk profile discussed. Call if any exertional chest pain, short of breath, dizzy or palpitations. Follow up in 8 weeks or earlier if needed.          [id number] [facility]  [address], [place], [address] [place] [address]  [phone]

## 2022-08-24 ENCOUNTER — HOSPITAL ENCOUNTER (OUTPATIENT)
Dept: OTHER | Age: 62
Setting detail: THERAPIES SERIES
Discharge: HOME | End: 2022-08-24
Payer: COMMERCIAL

## 2022-08-24 VITALS
DIASTOLIC BLOOD PRESSURE: 76 MMHG | OXYGEN SATURATION: 100 % | BODY MASS INDEX: 31.78 KG/M2 | WEIGHT: 209 LBS | SYSTOLIC BLOOD PRESSURE: 136 MMHG | HEART RATE: 62 BPM | RESPIRATION RATE: 18 BRPM

## 2022-08-24 LAB
ANION GAP SERPL CALCULATED.3IONS-SCNC: 12 MMOL/L (ref 7–16)
BUN BLDV-MCNC: 13 MG/DL (ref 6–23)
CALCIUM SERPL-MCNC: 9.6 MG/DL (ref 8.6–10.2)
CHLORIDE BLD-SCNC: 108 MMOL/L (ref 98–107)
CO2: 23 MMOL/L (ref 22–29)
CREAT SERPL-MCNC: 0.9 MG/DL (ref 0.5–1)
GFR AFRICAN AMERICAN: >60
GFR NON-AFRICAN AMERICAN: >60 ML/MIN/1.73
GLUCOSE BLD-MCNC: 108 MG/DL (ref 74–99)
POTASSIUM SERPL-SCNC: 3.9 MMOL/L (ref 3.5–5)
PRO-BNP: 713 PG/ML (ref 0–125)
SODIUM BLD-SCNC: 143 MMOL/L (ref 132–146)

## 2022-08-24 PROCEDURE — 99214 OFFICE O/P EST MOD 30 MIN: CPT

## 2022-08-24 PROCEDURE — 36415 COLL VENOUS BLD VENIPUNCTURE: CPT

## 2022-08-24 PROCEDURE — 80048 BASIC METABOLIC PNL TOTAL CA: CPT

## 2022-08-24 PROCEDURE — 83880 ASSAY OF NATRIURETIC PEPTIDE: CPT

## 2022-08-24 NOTE — PROGRESS NOTES
meloxicam (MOBIC) 15 MG tablet Take 15 mg by mouth daily  Historical Provider, MD   fluticasone (FLONASE) 50 MCG/ACT nasal spray 1 spray by Nasal route daily as needed for Rhinitis  Historical Provider, MD           Guideline directed medical:  ARNI/ACE I/ARB: Yes  Beta blocker:   Yes  Aldosterone antagonist:  Yes        Physical Examination:     /76   Pulse 62   Resp 18   Wt 209 lb (94.8 kg)   SpO2 100%   BMI 31.78 kg/m²     Assessment  Charting Type: Shift assessment (chf)    Neurological  Level of Consciousness: Alert (0)              Respiratory  Respiratory Pattern: Regular  Respiratory Depth: Normal  Respiratory Quality/Effort: Unlabored  Chest Assessment: Chest expansion symmetrical  L Breath Sounds: Clear  R Breath Sounds: Clear              Cardiac  Cardiac Regularity: Regular  Cardiac Rhythm: Sinus rhythm, Sinus rhythm with PVC    Rhythm Interpretation  Heart Rate: 62         Gastrointestinal  Abdominal (WDL): Within Defined Limits               Peripheral Vascular  Peripheral Vascular (WDL): Within Defined Limits  Edema: None                                                 Heart Rate: 62                     LAB DATA:    Last 3 BMP      Sodium (mmol/L)   Date Value   07/27/2022 140   07/05/2022 140   06/23/2022 140     Potassium (mmol/L)   Date Value   07/27/2022 4.1   07/05/2022 3.9   06/23/2022 3.7     Chloride (mmol/L)   Date Value   07/27/2022 103   07/05/2022 108 (H)   06/23/2022 107     CO2 (mmol/L)   Date Value   07/27/2022 26   07/05/2022 21 (L)   06/23/2022 24     BUN (mg/dL)   Date Value   07/27/2022 14   07/05/2022 16   06/23/2022 11     Glucose (mg/dL)   Date Value   07/27/2022 88   07/05/2022 99   06/23/2022 85     Calcium (mg/dL)   Date Value   07/27/2022 9.3   07/05/2022 9.2   06/23/2022 8.8       Last 3 BNP       Pro-BNP (pg/mL)   Date Value   07/27/2022 414 (H)   07/05/2022 299 (H)   06/23/2022 1,212 (H)          CBC: No results for input(s): WBC, HGB, PLT in the last 72 hours.  BMP:    No results for input(s): NA, K, CL, CO2, BUN, CREATININE, GLUCOSE in the last 72 hours. Hepatic: No results for input(s): AST, ALT, ALB, BILITOT, ALKPHOS in the last 72 hours. Troponin: No results for input(s): TROPONINI in the last 72 hours. BNP: No results for input(s): BNP in the last 72 hours. Lipids: No results for input(s): CHOL, HDL in the last 72 hours. Invalid input(s): LDLCALCU  INR: No results for input(s): INR in the last 72 hours. WEIGHTS:    Wt Readings from Last 3 Encounters:   08/24/22 209 lb (94.8 kg)   08/12/22 211 lb 6.4 oz (95.9 kg)   07/27/22 207 lb (93.9 kg)         TELEMETRY:  Cardiac Regularity: Regular  Cardiac Rhythm/Interpretation: SR        ASSESSMENT:  Ozzie Chaudhari presented for CHF clinic follow up. 2 lb weight gain since last visit on 8/12. Overall patient doing well with no C/O except for fluttering feeling in her chest the day before that resolved on its own. Patient admits to not drinking enough fluids and states she will try to drink more water. Interventions completed this visit:  IV diuretics given: No  Lab work obtained: Yes , BMP/BNP    Reviewed currently prescribed medications with patient, educated on importance of compliance and answered any questions regarding their medication  Educated on signs and symptoms of HF  Educated on low sodium diet    PLAN:  Scheduled to follow up in CHF clinic on   Future Appointments   Date Time Provider Shaun Tillman   9/23/2022  8:00 AM [address]   9/29/2022 10:15 AM [facility] ROOM 1 [facility]   10/7/2022 10:00 AM Maxi Quezada  [facility]     Given clinic phone number and aware of signs and symptoms to call with any HF change in symptoms. Pt denies any discomfort, PT WILL CALL FOR EARLIER APPT. IF NEEDED.

## 2022-09-07 RX ORDER — FUROSEMIDE 40 MG/1
TABLET ORAL
Qty: 90 TABLET | Refills: 3 | Status: SHIPPED | OUTPATIENT
Start: 2022-09-07

## 2022-09-23 ENCOUNTER — HOSPITAL ENCOUNTER (OUTPATIENT)
Dept: CARDIOLOGY | Age: 62
Discharge: HOME | End: 2022-09-23
Payer: COMMERCIAL

## 2022-09-23 DIAGNOSIS — I50.20 HFREF (HEART FAILURE WITH REDUCED EJECTION FRACTION) (HCC): ICD-10-CM

## 2022-09-23 DIAGNOSIS — I42.0 DILATED CARDIOMYOPATHY (HCC): ICD-10-CM

## 2022-09-23 PROCEDURE — 93308 TTE F-UP OR LMTD: CPT

## 2022-09-29 ENCOUNTER — HOSPITAL ENCOUNTER (OUTPATIENT)
Dept: OTHER | Age: 62
Setting detail: THERAPIES SERIES
Discharge: HOME | End: 2022-09-29
Payer: COMMERCIAL

## 2022-09-29 ENCOUNTER — TELEPHONE (OUTPATIENT)
Dept: CARDIOLOGY CLINIC | Age: 62
End: 2022-09-29

## 2022-09-29 VITALS
HEART RATE: 56 BPM | SYSTOLIC BLOOD PRESSURE: 123 MMHG | WEIGHT: 209 LBS | RESPIRATION RATE: 18 BRPM | DIASTOLIC BLOOD PRESSURE: 58 MMHG | BODY MASS INDEX: 31.78 KG/M2 | OXYGEN SATURATION: 98 %

## 2022-09-29 LAB
ANION GAP SERPL CALCULATED.3IONS-SCNC: 14 MMOL/L (ref 7–16)
BUN BLDV-MCNC: 15 MG/DL (ref 6–23)
CALCIUM SERPL-MCNC: 9.4 MG/DL (ref 8.6–10.2)
CHLORIDE BLD-SCNC: 105 MMOL/L (ref 98–107)
CO2: 22 MMOL/L (ref 22–29)
CREAT SERPL-MCNC: 1 MG/DL (ref 0.5–1)
GFR AFRICAN AMERICAN: >60
GFR NON-AFRICAN AMERICAN: >60 ML/MIN/1.73
GLUCOSE BLD-MCNC: 101 MG/DL (ref 74–99)
POTASSIUM SERPL-SCNC: 3.8 MMOL/L (ref 3.5–5)
PRO-BNP: 237 PG/ML (ref 0–125)
SODIUM BLD-SCNC: 141 MMOL/L (ref 132–146)

## 2022-09-29 PROCEDURE — 83880 ASSAY OF NATRIURETIC PEPTIDE: CPT

## 2022-09-29 PROCEDURE — 80048 BASIC METABOLIC PNL TOTAL CA: CPT

## 2022-09-29 PROCEDURE — 99214 OFFICE O/P EST MOD 30 MIN: CPT

## 2022-09-29 PROCEDURE — 36415 COLL VENOUS BLD VENIPUNCTURE: CPT

## 2022-09-29 NOTE — TELEPHONE ENCOUNTER
Per Dr. Pj Tang, Echo showed EF at 35-38%. This is better than the 30 % showing in May 2022 echo. Patient is ok to return Lifevest. Follow up in 6-8 weeks. Left message for patient to call the office.

## 2022-09-29 NOTE — PROGRESS NOTES
Congestive Heart Failure [id number] [facility] Dov   1960          Referring Provider: Tiara ANDERSON  Primary Care Physician: Dr. Ruiz Bell  Cardiologist: Dr. Lori Ayala  Nephrologist:         History of Present Illness:     Hero Taylor is a 62 y.o. female with a history of HFrEF, most recent EF 30%  from 5/11/22. Patient Story:    She does not have dyspnea with exertion, shortness of breath, or decline in overall functional capacity. She does not have orthopnea, PND, nocturnal cough or hemoptysis. She does not have abdominal distention or bloating, early satiety, anorexia/change in appetite. She does have a good urinary response to oral diuretic when she takes it, ordered prn, last time she took dose was at least 2 DAYS ago. . She does not have lower extremity edema. She denies lightheadedness, dizziness. She denies palpitations, syncope or near syncope. She does not complain of chest pain, pressure, discomfort. Allergies   Allergen Reactions    Penicillins Anaphylaxis    Penicillins            Prior to Visit Medications    Medication Sig Taking? Authorizing Provider   furosemide (LASIX) 40 MG tablet take 1 tablet by mouth daily if needed (WEIGHT GAIN, SHORTNESS OF BREATH, SWELLING)  Tommie Adams MD   sacubitril-valsartan (ENTRESTO)  MG per tablet Take 1 tablet by mouth 2 times daily  MEHDI Ny CNP   acetaminophen (TYLENOL) 500 MG tablet Take 2 tablets by mouth every 6 hours as needed for Pain or Fever Maximum dose- 8 tablets/24 hours.   MEHDI Kendrick CNP   dapagliflozin (FARXIGA) 10 MG tablet Take 1 tablet by mouth every morning  MEHDI Ny CNP   spironolactone (ALDACTONE) 25 MG tablet Take 1 tablet by mouth daily  MEHDI Ny CNP   metoprolol succinate (TOPROL XL) 50 MG extended release tablet Take 1 tablet by mouth daily  Pepito Molina DO   meloxicam [facility]) 15 MG tablet Take 15 mg by mouth daily  Historical Provider, MD   fluticasone (FLONASE) 50 MCG/ACT nasal spray 1 spray by Nasal route daily as needed for Rhinitis  Historical Provider, MD           Guideline directed medical:  ARNI/ACE I/ARB: Yes  Beta blocker:   Yes  Aldosterone antagonist:  Yes  FARXIGA      Physical Examination:     BP (!) 123/58   Pulse 56   Resp 18   Wt 209 lb (94.8 kg)   SpO2 98%   BMI 31.78 kg/m²     Assessment  Charting Type: Shift assessment (chf)              HEENT (Head, Ears, Eyes, Nose, & Throat)  HEENT (WDL): Within Defined Limits    Respiratory  Respiratory Pattern: Regular  Respiratory Depth: Normal  Respiratory Quality/Effort: Unlabored  Chest Assessment: Chest expansion symmetrical  L Breath Sounds: Clear  R Breath Sounds: Clear              Cardiac  Cardiac Regularity: Regular  Cardiac Rhythm: Sinus rhythm, Sinus rhythm with PVC    Rhythm Interpretation  Heart Rate: 56         Gastrointestinal  Abdominal (WDL): Within Defined Limits               Peripheral Vascular  Peripheral Vascular (WDL): Within Defined Limits  Edema: None                   Genitourinary  Genitourinary (WDL): Within Defined Limits                             Heart Rate: 56                     LAB DATA:    Last 3 BMP      Sodium (mmol/L)   Date Value   08/24/2022 143   07/27/2022 140   07/05/2022 140     Potassium (mmol/L)   Date Value   08/24/2022 3.9   07/27/2022 4.1   07/05/2022 3.9     Chloride (mmol/L)   Date Value   08/24/2022 108 (H)   07/27/2022 103   07/05/2022 108 (H)     CO2 (mmol/L)   Date Value   08/24/2022 23   07/27/2022 26   07/05/2022 21 (L)     BUN (mg/dL)   Date Value   08/24/2022 13   07/27/2022 14   07/05/2022 16     Glucose (mg/dL)   Date Value   08/24/2022 108 (H)   07/27/2022 88   07/05/2022 99     Calcium (mg/dL)   Date Value   08/24/2022 9.6   07/27/2022 9.3   07/05/2022 9.2       Last 3 BNP       Pro-BNP (pg/mL)   Date Value   08/24/2022 713 (H)   07/27/2022 414 (H)   07/05/2022 299 (H)          CBC: No results for input(s): WBC, HGB, PLT in the last 72 hours. BMP:    No results for input(s): NA, K, CL, CO2, BUN, CREATININE, GLUCOSE in the last 72 hours. Hepatic: No results for input(s): AST, ALT, ALB, BILITOT, ALKPHOS in the last 72 hours. Troponin: No results for input(s): TROPONINI in the last 72 hours. BNP: No results for input(s): BNP in the last 72 hours. Lipids: No results for input(s): CHOL, HDL in the last 72 hours. Invalid input(s): LDLCALCU  INR: No results for input(s): INR in the last 72 hours. WEIGHTS:    Wt Readings from Last 3 Encounters:   09/29/22 209 lb (94.8 kg)   08/24/22 209 lb (94.8 kg)   08/12/22 211 lb 6.4 oz (95.9 kg)         TELEMETRY:  Cardiac Regularity: Regular  Cardiac Rhythm/Interpretation: SR        ASSESSMENT:  Mirela Monae WEIGHT IS STABLE AND PT DENIES ANY DISCOMFORT. WILL CALL FOR EARLIER APPT. IF NEEDED. Interventions completed this visit:  IV diuretics given: No  Lab work obtained: Yes , BMP/BNP    Reviewed currently prescribed medications with patient, educated on importance of compliance and answered any questions regarding their medication  Educated on signs and symptoms of HF  Educated on low sodium diet    PLAN:  Scheduled to follow up in CHF clinic on   Future Appointments   Date Time Provider Shaun Tillman   10/7/2022 10:00 AM Riana Arauz MD [address] [place] [address]   11/17/2022 10:15 AM [facility] ROOM 1 [address]     Given clinic phone number and aware of signs and symptoms to call with any HF change in symptoms.

## 2022-10-04 NOTE — RESULT ENCOUNTER NOTE
Labs and CHF clinic note reviewed  On goal GDMT (BB limited by HR)  Down trending pro-bnp  Has follow up with Dr. Bao Lawrence scheduled

## 2022-10-07 ENCOUNTER — OFFICE VISIT (OUTPATIENT)
Dept: CARDIOLOGY CLINIC | Age: 62
End: 2022-10-07
Payer: COMMERCIAL

## 2022-10-07 VITALS
DIASTOLIC BLOOD PRESSURE: 84 MMHG | HEART RATE: 49 BPM | RESPIRATION RATE: 16 BRPM | HEIGHT: 68 IN | BODY MASS INDEX: 31.89 KG/M2 | OXYGEN SATURATION: 96 % | SYSTOLIC BLOOD PRESSURE: 128 MMHG | WEIGHT: 210.4 LBS

## 2022-10-07 DIAGNOSIS — G47.33 OSA (OBSTRUCTIVE SLEEP APNEA): ICD-10-CM

## 2022-10-07 DIAGNOSIS — I42.0 DILATED CARDIOMYOPATHY (HCC): ICD-10-CM

## 2022-10-07 DIAGNOSIS — I50.20 HFREF (HEART FAILURE WITH REDUCED EJECTION FRACTION) (HCC): Primary | ICD-10-CM

## 2022-10-07 PROCEDURE — 99214 OFFICE O/P EST MOD 30 MIN: CPT | Performed by: INTERNAL MEDICINE

## 2022-10-07 PROCEDURE — 93000 ELECTROCARDIOGRAM COMPLETE: CPT | Performed by: INTERNAL MEDICINE

## 2022-10-07 RX ORDER — METOPROLOL SUCCINATE 25 MG/1
25 TABLET, EXTENDED RELEASE ORAL DAILY
Qty: 90 TABLET | Refills: 3 | Status: SHIPPED | OUTPATIENT
Start: 2022-10-07

## 2022-10-07 NOTE — PATIENT INSTRUCTIONS
Call for chest pain, short of breath or heart racing   Decrease Metoprolol to 25 mg due to low heart rate  Monitor BP and heart rates if gets dizzy

## 2022-10-07 NOTE — PROGRESS NOTES
OFFICE VISIT     PRIMARY CARE PHYSICIAN:      Dontae Rosales MD       ALLERGIES / SENSITIVITIES:        Allergies   Allergen Reactions    Penicillins Anaphylaxis    Penicillins           REVIEWED MEDICATIONS:        Current Outpatient Medications:     metoprolol succinate (TOPROL XL) 25 MG extended release tablet, Take 1 tablet by mouth daily, Disp: 90 tablet, Rfl: 3    furosemide (LASIX) 40 MG tablet, take 1 tablet by mouth daily if needed (WEIGHT GAIN, SHORTNESS OF BREATH, SWELLING), Disp: 90 tablet, Rfl: 3    sacubitril-valsartan (ENTRESTO)  MG per tablet, Take 1 tablet by mouth 2 times daily, Disp: 60 tablet, Rfl: 3    dapagliflozin (FARXIGA) 10 MG tablet, Take 1 tablet by mouth every morning, Disp: 90 tablet, Rfl: 1    spironolactone (ALDACTONE) 25 MG tablet, Take 1 tablet by mouth daily, Disp: 90 tablet, Rfl: 1    meloxicam (MOBIC) 15 MG tablet, Take 15 mg by mouth daily, Disp: , Rfl:     fluticasone (FLONASE) 50 MCG/ACT nasal spray, 1 spray by Nasal route daily as needed for Rhinitis, Disp: , Rfl:     acetaminophen (TYLENOL) 500 MG tablet, Take 2 tablets by mouth every 6 hours as needed for Pain or Fever Maximum dose- 8 tablets/24 hours. (Patient not taking: Reported on 10/7/2022), Disp: 120 tablet, Rfl: 0      S: REASON FOR VISIT:       Chief Complaint   Patient presents with    Congestive Heart Failure    Cardiomyopathy     2 mo OV. Denies cardiac complaints. History of Present Illness:       Office Visit for follow up of CHF, CMP              62year old with new CMP, CHF came for f/u visit              Seen by our INDER - Jessica Thompson on 6/10/22 - Entresto increased 6/10/22   I saw her on 8/12/22                No hospitalizations or surgeries since last visit   Patient is compliant with all medications   Arnaldo any exertional chest pain or short of breath   No palpitations, dizzy or syncope.    Active at home   Try to watch diet          Past Medical History:   Diagnosis Date    Arthritis     CHF (congestive heart failure) (HCC)     Hypertension             Past Surgical History:   Procedure Laterality Date    BREAST ENHANCEMENT SURGERY      DILATION AND CURETTAGE OF UTERUS      HERNIA REPAIR      TONSILLECTOMY            Family History   Problem Relation Age of Onset    Heart Failure Mother           Social History     Tobacco Use    Smoking status: Never    Smokeless tobacco: Never   Substance Use Topics    Alcohol use: Yes     Comment: social         Review of Systems:    Constitutional: negative for fever and chills, or significant weight loss  HEENT: negative for acute visual symptoms or auditory problems, no dysphagia  Respiratory: negative for cough, wheezing, or hemoptysis  Cardiovascular: negative for chest pain, palpitations, and dyspnea  Gastrointestinal: negative for abdominal pain, diarrhea, melena, nausea and vomiting  Endocrine: Negative for polyuria and polydyspsia  Genitourinary: negative for dysuria and hematuria  Derm: negative for rash and skin lesion(s)  Neurological: negative for tingling, numbness, weakness, seizures  Endocrine: negative for polydipsia and polyuria  Musculoskeletal: negative for pain or tenderness  Psychiatric: negative for anxiety, depression, or suicidal ideations         O:  COMPLETE PHYSICAL EXAM:       /84   Pulse (!) 49   Resp 16   Ht 5' 8\" (1.727 m)   Wt 210 lb 6.4 oz (95.4 kg)   SpO2 96%   BMI 31.99 kg/m²       General:   Patient alert, comfortable, no distress. Appears stated age. HEENT:    Pupils equal, no icterus; Tongue moist.   Neck:              No masses, Thyroid not palpable. No elevated JVD, No carotid bruit. Chest:   Normal configuration, non tender. Lungs:   Clear to auscultation bilaterally except few scattered rhonchi. Cardiovascular:  Regular rhythm, 1/6 systolic murmur, No S3, no palpable thrills. Abdomen:  Soft, Bowel sounds normal, no pulsatile abdominal aorta. Extremities:  No edema. Distal pulses palpable.  No cyanosis   Skin:   Good turgor, warm and dry, no cyanosis. Musculoskeletal: No joint swelling or deformity. Neuro:   Cranial nerves grossly intact; No focal neurologic deficit. Psych:   Alert, good mood and effect. REVIEW OF DIAGNOSTIC TESTS:        Electrocardiogram: Reviewed    Limited Echo 9/23/2022   Summary   Normal left ventricular chamber size. Moderate global hypokinesis, LV EF 35-38%. Mild left ventricular hypertrophy noted. Stage I diastolic dysfunction. No evidence of pericardial effusion. No intra cardiac mass or thrombus. Compared to prior echo from May 2022, EF is slightly improved from 30% to 35-38%    TTE (5/11/2022)  Summary   Limited Study for LV EF. Left ventricular chamber mildly dilated, 6cm. Severe global hypokinesis, LV EF 30%. Stage I diastolic dysfunction. Trace mitral regurgitation. No mitral valve prolapse. No evidence of pericardial effusion. No intra cardiac mass or thrombus. No comparison study available. NM Stress (5/11/2022)  Impression: The myocardial perfusion imaging was abnormal.  The abnormality was based on dilated left ventricle with severe left ventricular dysfunction. No evidence of Lexiscan stress-induced reversible ischemia. Left ventricular systolic function was severely reduced, EF 30%  Overall intermediate risk myocardial perfusion study. No prior study available for comparison. ASSESSMENT / PLAN:    [place] was seen today for congestive heart failure and cardiomyopathy. Diagnoses and all orders for this visit:      Chronic - HFrEF (heart failure with reduced ejection fraction) (HCC) - Continue Lasix as needed; Follows with CHF clinic.  -     EKG 12 lead     Dilated cardiomyopathy (HCC) - EF 30%, Continue BB, Entresto, Spironolactone, Fraxega. EF 35-38% now; She returned Life Vest. Nonischemic stres test may 2022. Follows with CHF clinic.     Sinus bradycardia - Asymptomatic; Decrease Metoprolol to 25mg, Monitor HR, TSH OK     Essential HTN - Controlled. Alcohol use - Drinks wine, counseled to quit drinking     Hx of Rheumatoid Arthritis - Follows with Dr Sushil Simeon     Non-morbid obesity - Lost 20 lbs in few months, Diet, exercise as tolerates and continued weight loss discussed. Preventive Cardiology: Low cholesterol diet, regular exercise as tolerate, and gradual weight loss discussed. Other orders  -     metoprolol succinate (TOPROL XL) 25 MG extended release tablet; Take 1 tablet by mouth daily        Echo findings and above recommendations discussed with her   The patient's current medication list, allergies, problem list and results of prior tests (as available) were reviewed at today's visit   All questions answered about cardiac diagnoses and cardiac medications. Continue current medications. Monitor BP and heart rates. Compliance with medications and f/u with all physicians discussed. Risk factor modification based on risk profile discussed. Call if any exertional chest pain, short of breath, dizzy or palpitations. Follow up in 6 months or earlier if needed.          [address] [facility]  [address], [place], [address] [place] [address]  [phone]

## 2022-11-11 DIAGNOSIS — I50.20 HFREF (HEART FAILURE WITH REDUCED EJECTION FRACTION) (HCC): ICD-10-CM

## 2022-11-11 DIAGNOSIS — Z79.899 MEDICATION DOSE INCREASED: ICD-10-CM

## 2022-11-11 RX ORDER — SACUBITRIL AND VALSARTAN 97; 103 MG/1; MG/1
TABLET, FILM COATED ORAL
Qty: 60 TABLET | Refills: 3 | Status: SHIPPED | OUTPATIENT
Start: 2022-11-11

## 2022-11-17 ENCOUNTER — HOSPITAL ENCOUNTER (OUTPATIENT)
Dept: OTHER | Age: 62
Setting detail: THERAPIES SERIES
Discharge: HOME | End: 2022-11-17
Payer: COMMERCIAL

## 2022-11-22 ENCOUNTER — HOSPITAL ENCOUNTER (OUTPATIENT)
Dept: OTHER | Age: 62
Setting detail: THERAPIES SERIES
Discharge: HOME | End: 2022-11-22
Payer: COMMERCIAL

## 2022-11-22 VITALS
OXYGEN SATURATION: 99 % | BODY MASS INDEX: 31.78 KG/M2 | WEIGHT: 209 LBS | DIASTOLIC BLOOD PRESSURE: 71 MMHG | SYSTOLIC BLOOD PRESSURE: 117 MMHG | RESPIRATION RATE: 18 BRPM | HEART RATE: 68 BPM

## 2022-11-22 LAB
ANION GAP SERPL CALCULATED.3IONS-SCNC: 12 MMOL/L (ref 7–16)
BUN BLDV-MCNC: 14 MG/DL (ref 6–23)
CALCIUM SERPL-MCNC: 9.5 MG/DL (ref 8.6–10.2)
CHLORIDE BLD-SCNC: 106 MMOL/L (ref 98–107)
CO2: 23 MMOL/L (ref 22–29)
CREAT SERPL-MCNC: 0.8 MG/DL (ref 0.5–1)
GFR SERPL CREATININE-BSD FRML MDRD: >60 ML/MIN/1.73
GLUCOSE BLD-MCNC: 103 MG/DL (ref 74–99)
POTASSIUM SERPL-SCNC: 4 MMOL/L (ref 3.5–5)
PRO-BNP: 208 PG/ML (ref 0–125)
SODIUM BLD-SCNC: 141 MMOL/L (ref 132–146)

## 2022-11-22 PROCEDURE — 83880 ASSAY OF NATRIURETIC PEPTIDE: CPT

## 2022-11-22 PROCEDURE — 99214 OFFICE O/P EST MOD 30 MIN: CPT

## 2022-11-22 PROCEDURE — 80048 BASIC METABOLIC PNL TOTAL CA: CPT

## 2022-11-22 NOTE — PROGRESS NOTES
Congestive Heart Failure [id number] [facility] [address]   1960          Referring Provider: Anni ANDERSON  Primary Care Physician: Dr. Lennette Primrose  Cardiologist: Dr. Lowry Olszewski  Nephrologist:         History of Present Illness:     Matthew Zabala is a 62 y.o. female with a history of HFrEF, most recent EF 30%  from 5/11/22. Patient Story:    She does not have dyspnea with exertion, shortness of breath, or decline in overall functional capacity. She does not have orthopnea, PND, nocturnal cough or hemoptysis. She does not have abdominal distention or bloating, early satiety, anorexia/change in appetite. She does have a good urinary response to oral diuretic when she takes it, ordered prn, last time she took dose was probably around Sept or Oct. . She does not have lower extremity edema. She denies lightheadedness, dizziness. She denies palpitations, syncope or near syncope. She does not complain of chest pain, pressure, discomfort. Allergies   Allergen Reactions    Penicillins Anaphylaxis    Penicillins            Prior to Visit Medications    Medication Sig Taking? Authorizing Provider   ENTRESTO  MG per tablet take 1 tablet by mouth twice a day  Kasandra Gloria MD   metoprolol succinate (TOPROL XL) 25 MG extended release tablet Take 1 tablet by mouth daily  Kasandra Golria MD   furosemide (LASIX) 40 MG tablet take 1 tablet by mouth daily if needed (WEIGHT GAIN, SHORTNESS OF BREATH, SWELLING)  Kasandra Gloria MD   acetaminophen (TYLENOL) 500 MG tablet Take 2 tablets by mouth every 6 hours as needed for Pain or Fever Maximum dose- 8 tablets/24 hours.   MEHDI Hernandez CNP   dapagliflozin (FARXIGA) 10 MG tablet Take 1 tablet by mouth every morning  MEHDI Flores CNP   spironolactone (ALDACTONE) 25 MG tablet Take 1 tablet by mouth daily  MEHDI Flores CNP   meloxicam (MOBIC) 15 MG tablet Take 15 mg by mouth daily  Historical Provider, MD   fluticasone (FLONASE) 50 MCG/ACT nasal spray 1 spray by Nasal route daily as needed for Rhinitis  Historical Provider, MD           Guideline directed medical:  ARNI/ACE I/ARB: Yes  Beta blocker:   Yes  Aldosterone antagonist:  Yes  SGLT2i: Yes      Physical Examination:     /71   Pulse 68   Resp 18   Wt 209 lb (94.8 kg)   SpO2 99%   BMI 31.78 kg/m²     Assessment  Charting Type: Shift assessment (chf)              HEENT (Head, Ears, Eyes, Nose, & Throat)  HEENT (WDL): Within Defined Limits    Respiratory  Respiratory Pattern: Regular  Respiratory Depth: Normal  Respiratory Quality/Effort: Unlabored  Chest Assessment: Chest expansion symmetrical  L Breath Sounds: Clear  R Breath Sounds: Clear              Cardiac  Cardiac Regularity: Regular  Cardiac Rhythm: Sinus rhythm    Rhythm Interpretation  Heart Rate: 68         Gastrointestinal  Abdominal (WDL): Within Defined Limits               Peripheral Vascular  Peripheral Vascular (WDL): Within Defined Limits  Edema: None                   Genitourinary  Genitourinary (WDL): Within Defined Limits                             Heart Rate: 68                     LAB DATA:    Last 3 BMP      Sodium (mmol/L)   Date Value   09/29/2022 141   08/24/2022 143   07/27/2022 140     Potassium (mmol/L)   Date Value   09/29/2022 3.8   08/24/2022 3.9   07/27/2022 4.1     Chloride (mmol/L)   Date Value   09/29/2022 105   08/24/2022 108 (H)   07/27/2022 103     CO2 (mmol/L)   Date Value   09/29/2022 22   08/24/2022 23   07/27/2022 26     BUN (mg/dL)   Date Value   09/29/2022 15   08/24/2022 13   07/27/2022 14     Glucose (mg/dL)   Date Value   09/29/2022 101 (H)   08/24/2022 108 (H)   07/27/2022 88     Calcium (mg/dL)   Date Value   09/29/2022 9.4   08/24/2022 9.6   07/27/2022 9.3       Last 3 BNP       Pro-BNP (pg/mL)   Date Value   09/29/2022 237 (H)   08/24/2022 713 (H)   07/27/2022 414 (H)          CBC: No results for input(s): WBC, HGB, PLT in the last 72 hours. BMP:    No results for input(s): NA, K, CL, CO2, BUN, CREATININE, GLUCOSE in the last 72 hours. Hepatic: No results for input(s): AST, ALT, ALB, BILITOT, ALKPHOS in the last 72 hours. Troponin: No results for input(s): TROPONINI in the last 72 hours. BNP: No results for input(s): BNP in the last 72 hours. Lipids: No results for input(s): CHOL, HDL in the last 72 hours. Invalid input(s): LDLCALCU  INR: No results for input(s): INR in the last 72 hours. WEIGHTS:    Wt Readings from Last 3 Encounters:   11/22/22 209 lb (94.8 kg)   10/07/22 210 lb 6.4 oz (95.4 kg)   09/29/22 209 lb (94.8 kg)         TELEMETRY:  Cardiac Regularity: Regular  Cardiac Rhythm/Interpretation: SR        ASSESSMENT:  Mirela Monae WEIGHT IS STABLE. PT DENIES ANY DISCOMFORT. WILL CALL FOR EARLIER APPT. IF NEEDED. Interventions completed this visit:  IV diuretics given: No  Lab work obtained: Yes , BMP/BNP    Reviewed currently prescribed medications with patient, educated on importance of compliance and answered any questions regarding their medication  Educated on signs and symptoms of HF  Educated on low sodium diet    PLAN:  Scheduled to follow up in CHF clinic on   Future Appointments   Date Time Provider Shaun Tillman   11/29/2022  1:00 PM Karina Montoya MD [facility]   1/11/2023 10:15 AM [facility] ROOM 1 [address]     Given clinic phone number and aware of signs and symptoms to call with any HF change in symptoms.

## 2022-11-22 NOTE — RESULT ENCOUNTER NOTE
Labs and CHF clinic note reviewed  On goal GDMT  Continue current management  Follow up as scheduled, sooner if needed    Thank you

## 2022-12-02 ENCOUNTER — APPOINTMENT (OUTPATIENT)
Dept: OTHER | Age: 62
End: 2022-12-02
Payer: COMMERCIAL

## 2022-12-04 DIAGNOSIS — I50.9 ACUTE HEART FAILURE, UNSPECIFIED HEART FAILURE TYPE (HCC): ICD-10-CM

## 2022-12-04 DIAGNOSIS — Z79.899 NEW MEDICATION ADDED: ICD-10-CM

## 2022-12-05 RX ORDER — SPIRONOLACTONE 25 MG/1
TABLET ORAL
Qty: 90 TABLET | Refills: 1 | Status: SHIPPED | OUTPATIENT
Start: 2022-12-05

## 2023-01-03 DIAGNOSIS — Z79.899 MEDICATION DOSE INCREASED: ICD-10-CM

## 2023-01-03 DIAGNOSIS — I50.20 HFREF (HEART FAILURE WITH REDUCED EJECTION FRACTION) (HCC): ICD-10-CM

## 2023-01-03 RX ORDER — SACUBITRIL AND VALSARTAN 97; 103 MG/1; MG/1
TABLET, FILM COATED ORAL
Qty: 180 TABLET | Refills: 3 | Status: SHIPPED | OUTPATIENT
Start: 2023-01-03

## 2023-01-11 ENCOUNTER — HOSPITAL ENCOUNTER (OUTPATIENT)
Dept: OTHER | Age: 63
Setting detail: THERAPIES SERIES
Discharge: HOME | End: 2023-01-11
Payer: COMMERCIAL

## 2023-01-11 VITALS
RESPIRATION RATE: 18 BRPM | HEART RATE: 64 BPM | BODY MASS INDEX: 32.99 KG/M2 | WEIGHT: 217 LBS | DIASTOLIC BLOOD PRESSURE: 54 MMHG | SYSTOLIC BLOOD PRESSURE: 98 MMHG

## 2023-01-11 DIAGNOSIS — I50.20 HFREF (HEART FAILURE WITH REDUCED EJECTION FRACTION) (HCC): ICD-10-CM

## 2023-01-11 DIAGNOSIS — Z79.899 NEW MEDICATION ADDED: ICD-10-CM

## 2023-01-11 LAB
ANION GAP SERPL CALCULATED.3IONS-SCNC: 13 MMOL/L (ref 7–16)
BUN BLDV-MCNC: 19 MG/DL (ref 6–23)
CALCIUM SERPL-MCNC: 9.7 MG/DL (ref 8.6–10.2)
CHLORIDE BLD-SCNC: 101 MMOL/L (ref 98–107)
CO2: 25 MMOL/L (ref 22–29)
CREAT SERPL-MCNC: 1 MG/DL (ref 0.5–1)
GFR SERPL CREATININE-BSD FRML MDRD: >60 ML/MIN/1.73
GLUCOSE BLD-MCNC: 89 MG/DL (ref 74–99)
POTASSIUM SERPL-SCNC: 4.2 MMOL/L (ref 3.5–5)
PRO-BNP: 98 PG/ML (ref 0–125)
SODIUM BLD-SCNC: 139 MMOL/L (ref 132–146)

## 2023-01-11 PROCEDURE — 96374 THER/PROPH/DIAG INJ IV PUSH: CPT

## 2023-01-11 PROCEDURE — 36415 COLL VENOUS BLD VENIPUNCTURE: CPT

## 2023-01-11 PROCEDURE — 80048 BASIC METABOLIC PNL TOTAL CA: CPT

## 2023-01-11 PROCEDURE — 83880 ASSAY OF NATRIURETIC PEPTIDE: CPT

## 2023-01-11 PROCEDURE — 99214 OFFICE O/P EST MOD 30 MIN: CPT

## 2023-01-11 PROCEDURE — 6360000002 HC RX W HCPCS: Performed by: INTERNAL MEDICINE

## 2023-01-11 PROCEDURE — 2580000003 HC RX 258: Performed by: INTERNAL MEDICINE

## 2023-01-11 RX ORDER — FUROSEMIDE 10 MG/ML
40 INJECTION INTRAMUSCULAR; INTRAVENOUS ONCE
Status: COMPLETED | OUTPATIENT
Start: 2023-01-11 | End: 2023-01-11

## 2023-01-11 RX ORDER — SODIUM CHLORIDE 0.9 % (FLUSH) 0.9 %
10 SYRINGE (ML) INJECTION 2 TIMES DAILY
Status: DISCONTINUED | OUTPATIENT
Start: 2023-01-11 | End: 2023-01-12 | Stop reason: HOSPADM

## 2023-01-11 RX ADMIN — FUROSEMIDE 40 MG: 10 INJECTION, SOLUTION INTRAMUSCULAR; INTRAVENOUS at 10:22

## 2023-01-11 RX ADMIN — SODIUM CHLORIDE, PRESERVATIVE FREE 10 ML: 5 INJECTION INTRAVENOUS at 10:22

## 2023-01-11 NOTE — PLAN OF CARE
Problem: Chronic Conditions and Co-morbidities  Goal: Patient's chronic conditions and co-morbidity symptoms are monitored and maintained or improved  Flowsheets (Taken 1/11/2023 7285)  Care Plan - Patient's Chronic Conditions and Co-Morbidity Symptoms are Monitored and Maintained or Improved: Monitor and assess patient's chronic conditions and comorbid symptoms for stability, deterioration, or improvement

## 2023-01-11 NOTE — PROGRESS NOTES
Congestive Heart Failure [id number] [facility] Dov   1960          Referring Provider: Rony HARDING-CNP  Primary Care Physician: Dr. Rosalee Wolf  Cardiologist: Dr. Dianna Dong  Nephrologist:         History of Present Illness:     Maral Duque is a 65 y.o. female with a history of HFrEF, most recent EF 30%  from 5/11/22. Patient Story:    She does not have dyspnea with exertion, shortness of breath, or decline in overall functional capacity. She does not have orthopnea, PND, nocturnal cough or hemoptysis. She does not have abdominal distention or bloating, early satiety, anorexia/change in appetite. She does have a good urinary response to oral diuretic when she takes it, ordered prn, last time she took dose was TODAY She does not have lower extremity edema. She denies lightheadedness, dizziness. She denies palpitations, syncope or near syncope. She does not complain of chest pain, pressure, discomfort. Allergies   Allergen Reactions    Penicillins Anaphylaxis    Penicillins            Prior to Visit Medications    Medication Sig Taking? Authorizing Provider   sacubitril-valsartan (ENTRESTO)  MG per tablet take 1 tablet by mouth twice a day  Ani Garrido MD   spironolactone (ALDACTONE) 25 MG tablet take 1 tablet by mouth once daily  Ani Garrido MD   metoprolol succinate (TOPROL XL) 25 MG extended release tablet Take 1 tablet by mouth daily  Ani Garrido MD   furosemide (LASIX) 40 MG tablet take 1 tablet by mouth daily if needed (WEIGHT GAIN, SHORTNESS OF BREATH, SWELLING)  Ani Garrido MD   acetaminophen (TYLENOL) 500 MG tablet Take 2 tablets by mouth every 6 hours as needed for Pain or Fever Maximum dose- 8 tablets/24 hours.   MEHDI Liu CNP   dapagliflozin (FARXIGA) 10 MG tablet Take 1 tablet by mouth every morning  MEHDI Brady CNP   meloxicam (MOBIC) 15 MG tablet Take 15 mg by mouth daily  Patient not taking: Reported on 1/11/2023  Historical Provider, MD   fluticasone (FLONASE) 50 MCG/ACT nasal spray 1 spray by Nasal route daily as needed for Rhinitis  Historical Provider, MD           Guideline directed medical:  ARNI/ACE I/ARB: Yes  Beta blocker:   Yes  Aldosterone antagonist:  Yes  SGLT2i: Yes      Physical Examination:     BP (!) 98/54   Pulse 64   Resp 18   Wt 217 lb (98.4 kg)   BMI 32.99 kg/m²     Assessment  Charting Type: Shift assessment (chf)              HEENT (Head, Ears, Eyes, Nose, & Throat)  HEENT (WDL): Within Defined Limits    Respiratory  Respiratory Pattern: Regular  Respiratory Depth: Normal  Respiratory Quality/Effort: Unlabored  Chest Assessment: Chest expansion symmetrical  L Breath Sounds: Clear  R Breath Sounds: Clear              Cardiac  Cardiac Regularity: Regular  Cardiac Rhythm: Sinus rhythm    Rhythm Interpretation  Heart Rate: 64         Gastrointestinal  Abdominal (WDL): Within Defined Limits               Peripheral Vascular  Peripheral Vascular (WDL): Within Defined Limits  Edema: None                   Genitourinary  Genitourinary (WDL): Within Defined Limits                             Heart Rate: 64                     LAB DATA:    Last 3 BMP      Sodium (mmol/L)   Date Value   11/22/2022 141   09/29/2022 141   08/24/2022 143     Potassium (mmol/L)   Date Value   11/22/2022 4.0   09/29/2022 3.8   08/24/2022 3.9     Chloride (mmol/L)   Date Value   11/22/2022 106   09/29/2022 105   08/24/2022 108 (H)     CO2 (mmol/L)   Date Value   11/22/2022 23   09/29/2022 22   08/24/2022 23     BUN (mg/dL)   Date Value   11/22/2022 14   09/29/2022 15   08/24/2022 13     Glucose (mg/dL)   Date Value   11/22/2022 103 (H)   09/29/2022 101 (H)   08/24/2022 108 (H)     Calcium (mg/dL)   Date Value   11/22/2022 9.5   09/29/2022 9.4   08/24/2022 9.6       Last 3 BNP       Pro-BNP (pg/mL)   Date Value   11/22/2022 208 (H)   09/29/2022 237 (H)   08/24/2022 713 (H) CBC: No results for input(s): WBC, HGB, PLT in the last 72 hours. BMP:    No results for input(s): NA, K, CL, CO2, BUN, CREATININE, GLUCOSE in the last 72 hours. Hepatic: No results for input(s): AST, ALT, ALB, BILITOT, ALKPHOS in the last 72 hours. Troponin: No results for input(s): TROPONINI in the last 72 hours. BNP: No results for input(s): BNP in the last 72 hours. Lipids: No results for input(s): CHOL, HDL in the last 72 hours. Invalid input(s): LDLCALCU  INR: No results for input(s): INR in the last 72 hours. WEIGHTS:    Wt Readings from Last 3 Encounters:   01/11/23 217 lb (98.4 kg)   11/22/22 209 lb (94.8 kg)   10/07/22 210 lb 6.4 oz (95.4 kg)         TELEMETRY:  Cardiac Regularity: Regular  Cardiac Rhythm/Interpretation: SR        ASSESSMENT:  Mirela Monae WEIGHT IS UP 8LBS, PT ADMITS TO EATING  MORE OVER THE HOLIDAYS. PT DOES CONFIRM SHE IS TAKING PRN ORAL DIURETIC AND INSTRUCTED TO CONTINUE TO TAKE UNTIL WEIGHT IS BACK TO BASELINE, PT VERBALIZED UNDERSTANDING. PT REQUESTING 2 MONTH APPT. AND WILL CALL FOR EARLIER APPT. IF NEEDED. Interventions completed this visit:  IV diuretics given: YES, LASIX 40 MG IV  Lab work obtained: Yes , BMP/BNP    Reviewed currently prescribed medications with patient, educated on importance of compliance and answered any questions regarding their medication  Educated on signs and symptoms of HF  Educated on low sodium diet    PLAN:  Scheduled to follow up in CHF clinic on   Future Appointments   Date Time Provider Shaun Tillman   3/16/2023  9:45 AM [facility] CHF ROOM 1 [facility] Delon Leader     Given clinic phone number and aware of signs and symptoms to call with any HF change in symptoms.

## 2023-01-12 RX ORDER — DAPAGLIFLOZIN 10 MG/1
TABLET, FILM COATED ORAL
Qty: 90 TABLET | Refills: 1 | Status: SHIPPED | OUTPATIENT
Start: 2023-01-12

## 2023-02-23 ENCOUNTER — HOSPITAL ENCOUNTER (OUTPATIENT)
Dept: GENERAL RADIOLOGY | Age: 63
Discharge: HOME | End: 2023-02-25
Payer: COMMERCIAL

## 2023-02-23 VITALS — BODY MASS INDEX: 32.89 KG/M2 | HEIGHT: 68 IN | WEIGHT: 217 LBS

## 2023-02-23 DIAGNOSIS — Z12.31 VISIT FOR SCREENING MAMMOGRAM: ICD-10-CM

## 2023-02-23 PROCEDURE — 77067 SCR MAMMO BI INCL CAD: CPT

## 2023-03-16 ENCOUNTER — HOSPITAL ENCOUNTER (OUTPATIENT)
Dept: OTHER | Age: 63
Setting detail: THERAPIES SERIES
Discharge: HOME | End: 2023-03-16
Payer: COMMERCIAL

## 2023-03-16 VITALS
WEIGHT: 220 LBS | OXYGEN SATURATION: 100 % | SYSTOLIC BLOOD PRESSURE: 104 MMHG | BODY MASS INDEX: 33.34 KG/M2 | RESPIRATION RATE: 18 BRPM | HEIGHT: 68 IN | DIASTOLIC BLOOD PRESSURE: 60 MMHG | HEART RATE: 64 BPM

## 2023-03-16 LAB
ANION GAP SERPL CALCULATED.3IONS-SCNC: 13 MMOL/L (ref 7–16)
BNP BLD-MCNC: 168 PG/ML (ref 0–125)
BUN SERPL-MCNC: 14 MG/DL (ref 6–23)
CALCIUM SERPL-MCNC: 9.5 MG/DL (ref 8.6–10.2)
CHLORIDE SERPL-SCNC: 104 MMOL/L (ref 98–107)
CO2 SERPL-SCNC: 22 MMOL/L (ref 22–29)
CREAT SERPL-MCNC: 0.9 MG/DL (ref 0.5–1)
GLUCOSE SERPL-MCNC: 116 MG/DL (ref 74–99)
POTASSIUM SERPL-SCNC: 3.7 MMOL/L (ref 3.5–5)
SODIUM SERPL-SCNC: 139 MMOL/L (ref 132–146)

## 2023-03-16 PROCEDURE — 36415 COLL VENOUS BLD VENIPUNCTURE: CPT

## 2023-03-16 PROCEDURE — 96374 THER/PROPH/DIAG INJ IV PUSH: CPT

## 2023-03-16 PROCEDURE — 80048 BASIC METABOLIC PNL TOTAL CA: CPT

## 2023-03-16 PROCEDURE — 2580000003 HC RX 258: Performed by: NURSE PRACTITIONER

## 2023-03-16 PROCEDURE — 6360000002 HC RX W HCPCS: Performed by: NURSE PRACTITIONER

## 2023-03-16 PROCEDURE — 83880 ASSAY OF NATRIURETIC PEPTIDE: CPT

## 2023-03-16 PROCEDURE — 99214 OFFICE O/P EST MOD 30 MIN: CPT

## 2023-03-16 RX ORDER — FUROSEMIDE 10 MG/ML
40 INJECTION INTRAMUSCULAR; INTRAVENOUS ONCE
Status: COMPLETED | OUTPATIENT
Start: 2023-03-16 | End: 2023-03-16

## 2023-03-16 RX ORDER — SODIUM CHLORIDE 0.9 % (FLUSH) 0.9 %
5-40 SYRINGE (ML) INJECTION 2 TIMES DAILY
Status: DISCONTINUED | OUTPATIENT
Start: 2023-03-16 | End: 2023-03-17 | Stop reason: HOSPADM

## 2023-03-16 RX ADMIN — SODIUM CHLORIDE, PRESERVATIVE FREE 10 ML: 5 INJECTION INTRAVENOUS at 10:45

## 2023-03-16 RX ADMIN — FUROSEMIDE 40 MG: 10 INJECTION, SOLUTION INTRAMUSCULAR; INTRAVENOUS at 10:44

## 2023-03-16 NOTE — PLAN OF CARE
Problem: Chronic Conditions and Co-morbidities  Goal: Patient's chronic conditions and co-morbidity symptoms are monitored and maintained or improved  Flowsheets (Taken 3/16/2023 6012)  Care Plan - Patient's Chronic Conditions and Co-Morbidity Symptoms are Monitored and Maintained or Improved: Monitor and assess patient's chronic conditions and comorbid symptoms for stability, deterioration, or improvement

## 2023-03-16 NOTE — PROGRESS NOTES
Congestive Heart Failure [id number] [facility]   1960          Referring Provider: Jo-Ann HARDING-CNP  Primary Care Physician: Dr. Jose Strauss  Cardiologist: Dr. Rony Mai  Nephrologist:         History of Present Illness:     Africa Sarmiento is a 65 y.o. female with a history of HFrEF, most recent EF 30%  from 5/11/22. Patient Story:    She does not have dyspnea with exertion, shortness of breath, or decline in overall functional capacity. She does not have orthopnea, PND, nocturnal cough or hemoptysis. She does not have abdominal distention or bloating, early satiety, anorexia/change in appetite. She does have a good urinary response to oral diuretic when she takes it, ordered prn, last time she took dose was YESTERDAY She DOES have lower extremity edema +1 TO LEFT LOWER LEG. She denies lightheadedness, dizziness. She denies palpitations, syncope or near syncope. She does not complain of chest pain, pressure, discomfort. Allergies   Allergen Reactions    Penicillins Anaphylaxis    Penicillins            Prior to Visit Medications    Medication Sig Taking? Authorizing Provider   FARXIGA 10 MG tablet take 1 tablet by mouth once daily  Matthew Oakley MD   sacubitril-valsartan (ENTRESTO)  MG per tablet take 1 tablet by mouth twice a day  Matthew Oakley MD   spironolactone (ALDACTONE) 25 MG tablet take 1 tablet by mouth once daily  Matthew Oakley MD   metoprolol succinate (TOPROL XL) 25 MG extended release tablet Take 1 tablet by mouth daily  Matthew Oakley MD   furosemide (LASIX) 40 MG tablet take 1 tablet by mouth daily if needed (WEIGHT GAIN, SHORTNESS OF BREATH, SWELLING)  Matthew Oakley MD   acetaminophen (TYLENOL) 500 MG tablet Take 2 tablets by mouth every 6 hours as needed for Pain or Fever Maximum dose- 8 tablets/24 hours.   MEHDI Hernandez CNP   meloxicam (MOBIC) 15 MG tablet Take 15 mg by mouth daily  Patient not taking: No sig reported  Historical Provider, MD   fluticasone (FLONASE) 50 MCG/ACT nasal spray 1 spray by Nasal route daily as needed for Rhinitis  Historical Provider, MD           Guideline directed medical:  ARNI/ACE I/ARB: Yes ENTRESTO  Beta blocker:  Yes TOPROL XL  Aldosterone antagonist:  Yes ALDACTONE  SGLT2i: Yes JARDIANCE      Physical Examination:     /60   Pulse 64   Resp 18   Ht 5' 8\" (1.727 m)   Wt 220 lb (99.8 kg)   SpO2 100%   BMI 33.45 kg/m²     Assessment  Charting Type: Shift assessment    Neurological  Level of Consciousness: Alert (0)         HEENT (Head, Ears, Eyes, Nose, & Throat)  HEENT (WDL): Within Defined Limits    Respiratory  L Breath Sounds: Clear  R Breath Sounds: Clear              Cardiac  Cardiac Regularity: Regular  Cardiac Rhythm: Sinus magdalena    Rhythm Interpretation  Heart Rate: 64         Gastrointestinal  Abdominal (WDL): Within Defined Limits               Peripheral Vascular  Peripheral Vascular (WDL): Exceptions to WDL  Edema: Right lower extremity, Left lower extremity                   Genitourinary  Genitourinary (WDL): Within Defined Limits                             Heart Rate: 64                     LAB DATA:    Last 3 BMP      Sodium (mmol/L)   Date Value   03/16/2023 139   01/11/2023 139   11/22/2022 141     Potassium (mmol/L)   Date Value   03/16/2023 3.7   01/11/2023 4.2   11/22/2022 4.0     Chloride (mmol/L)   Date Value   03/16/2023 104   01/11/2023 101   11/22/2022 106     CO2 (mmol/L)   Date Value   03/16/2023 22   01/11/2023 25   11/22/2022 23     BUN (mg/dL)   Date Value   03/16/2023 14   01/11/2023 19   11/22/2022 14     Glucose (mg/dL)   Date Value   03/16/2023 116 (H)   01/11/2023 89   11/22/2022 103 (H)     Calcium (mg/dL)   Date Value   03/16/2023 9.5   01/11/2023 9.7   11/22/2022 9.5       Last 3 BNP       Pro-BNP (pg/mL)   Date Value   03/16/2023 168 (H)   01/11/2023 98   11/22/2022 208 (H)          CBC: No results for input(s): WBC, HGB, PLT in the last 72 hours. BMP:    Recent Labs     03/16/23  1045      K 3.7      CO2 22   BUN 14   CREATININE 0.9   GLUCOSE 116*       Hepatic: No results for input(s): AST, ALT, ALB, BILITOT, ALKPHOS in the last 72 hours. Troponin: No results for input(s): TROPONINI in the last 72 hours. BNP: No results for input(s): BNP in the last 72 hours. Lipids: No results for input(s): CHOL, HDL in the last 72 hours. Invalid input(s): LDLCALCU  INR: No results for input(s): INR in the last 72 hours. WEIGHTS:    Wt Readings from Last 3 Encounters:   03/16/23 220 lb (99.8 kg)   02/23/23 217 lb (98.4 kg)   01/11/23 217 lb (98.4 kg)         TELEMETRY:  Cardiac Regularity: Regular  Cardiac Rhythm/Interpretation: SR        ASSESSMENT:  Mirela Monae WEIGHT IS UP 3LBS, PT ADMITS TO EATING  MORE OVER THE PAST FEW MONTHS AND PATIENT WENT TO [place] AND ATE AT [place]. PT DOES CONFIRM SHE IS TAKING PRN ORAL DIURETIC AND INSTRUCTED TO CONTINUE TO TAKE UNTIL WEIGHT IS BACK TO BASELINE, PT VERBALIZED UNDERSTANDING. PT REQUESTING 2 MONTH APPT. AND WILL CALL FOR EARLIER APPT. IF NEEDED. Interventions completed this visit:  IV diuretics given: YES, LASIX 40 MG IV FOR WEIGHT GAIN AND +1 PITTING EDEMA TO RIGHT LOWER LEG  Lab work obtained: Yes , BMP/BNP    Reviewed currently prescribed medications with patient, educated on importance of compliance and answered any questions regarding their medication  Educated on signs and symptoms of HF  Educated on low sodium diet    PLAN:  Scheduled to follow up in CHF clinic on   Future Appointments   Date Time Provider Shaun Tillman   5/18/2023 10:00 AM [facility] ROOM 1 [address]       Given clinic phone number and aware of signs and symptoms to call with any HF change in symptoms.

## 2023-03-19 NOTE — RESULT ENCOUNTER NOTE
Labs and CHF Clinic note reviewed  On goal GDMT  Please get echocardiogram - if EF remains depressed recommend EP evaluation and consider referral to [facility]

## 2023-03-20 ENCOUNTER — TELEPHONE (OUTPATIENT)
Dept: OTHER | Age: 63
End: 2023-03-20

## 2023-03-20 DIAGNOSIS — I50.9 CONGESTIVE HEART FAILURE, UNSPECIFIED HF CHRONICITY, UNSPECIFIED HEART FAILURE TYPE (HCC): Primary | ICD-10-CM

## 2023-03-20 NOTE — TELEPHONE ENCOUNTER
Dr Margot Kapoor patient  Needs echo    Please get echocardiogram - if EF remains depressed recommend EP evaluation and consider referral to [facility]    Order is placed. Will have coordinator Esther Wilks manage scheduling needs.

## 2023-03-20 NOTE — TELEPHONE ENCOUNTER
----- Message from MEHDI June - CNP sent at 3/19/2023  7:45 PM EDT -----  Labs and CHF Clinic note reviewed  On goal GDMT  Please get echocardiogram - if EF remains depressed recommend EP evaluation and consider referral to [facility]

## 2023-04-12 ENCOUNTER — TELEPHONE (OUTPATIENT)
Dept: CARDIOLOGY CLINIC | Age: 63
End: 2023-04-12

## 2023-04-24 ENCOUNTER — HOSPITAL ENCOUNTER (OUTPATIENT)
Dept: CARDIOLOGY | Age: 63
Discharge: HOME | End: 2023-04-24
Payer: COMMERCIAL

## 2023-04-24 DIAGNOSIS — I50.9 CONGESTIVE HEART FAILURE, UNSPECIFIED HF CHRONICITY, UNSPECIFIED HEART FAILURE TYPE (HCC): ICD-10-CM

## 2023-04-24 LAB
LV EF: 60 %
LVEF MODALITY: NORMAL

## 2023-04-24 PROCEDURE — 93306 TTE W/DOPPLER COMPLETE: CPT

## 2023-05-01 ENCOUNTER — TELEPHONE (OUTPATIENT)
Dept: CARDIOLOGY CLINIC | Age: 63
End: 2023-05-01

## 2023-05-01 NOTE — TELEPHONE ENCOUNTER
----- Message from Dick Becerra MD sent at 5/1/2023  8:14 AM EDT -----  Regarding: Echo  Tell her that her Echo showed heart function is normal 60% (compared to 35-38%)  Continue same meds  IV 2-3 months      Dick Becerra MD    ----- Message -----  From: Gina Harrell Incoming Cardiology Results From [facility]  Sent: 4/29/2023   8:54 PM EDT  To: Dick Becerra MD

## 2023-05-18 ENCOUNTER — HOSPITAL ENCOUNTER (OUTPATIENT)
Dept: OTHER | Age: 63
Setting detail: THERAPIES SERIES
Discharge: HOME | End: 2023-05-18
Payer: COMMERCIAL

## 2023-05-18 VITALS
RESPIRATION RATE: 18 BRPM | SYSTOLIC BLOOD PRESSURE: 138 MMHG | BODY MASS INDEX: 33.15 KG/M2 | DIASTOLIC BLOOD PRESSURE: 72 MMHG | WEIGHT: 218 LBS | HEART RATE: 67 BPM | OXYGEN SATURATION: 97 %

## 2023-05-18 LAB
ANION GAP SERPL CALCULATED.3IONS-SCNC: 8 MMOL/L (ref 7–16)
BNP BLD-MCNC: 181 PG/ML (ref 0–125)
BUN SERPL-MCNC: 12 MG/DL (ref 6–23)
CALCIUM SERPL-MCNC: 9.4 MG/DL (ref 8.6–10.2)
CHLORIDE SERPL-SCNC: 106 MMOL/L (ref 98–107)
CO2 SERPL-SCNC: 28 MMOL/L (ref 22–29)
CREAT SERPL-MCNC: 1 MG/DL (ref 0.5–1)
GLUCOSE SERPL-MCNC: 100 MG/DL (ref 74–99)
POTASSIUM SERPL-SCNC: 4.3 MMOL/L (ref 3.5–5)
SODIUM SERPL-SCNC: 142 MMOL/L (ref 132–146)

## 2023-05-18 PROCEDURE — 36415 COLL VENOUS BLD VENIPUNCTURE: CPT

## 2023-05-18 PROCEDURE — 99214 OFFICE O/P EST MOD 30 MIN: CPT

## 2023-05-18 PROCEDURE — 80048 BASIC METABOLIC PNL TOTAL CA: CPT

## 2023-05-18 PROCEDURE — 83880 ASSAY OF NATRIURETIC PEPTIDE: CPT

## 2023-05-18 NOTE — PLAN OF CARE
Problem: Chronic Conditions and Co-morbidities  Goal: Patient's chronic conditions and co-morbidity symptoms are monitored and maintained or improved  5/18/2023 1119 by Emmanuel Calderón RN  Outcome: Progressing  5/18/2023 1107 by Emmanuel Calderón RN  Outcome: Progressing  5/18/2023 0659 by Viola Reis RN  Flowsheets (Taken 5/18/2023 1150)  Care Plan - Patient's Chronic Conditions and Co-Morbidity Symptoms are Monitored and Maintained or Improved: Monitor and assess patient's chronic conditions and comorbid symptoms for stability, deterioration, or improvement

## 2023-05-18 NOTE — PLAN OF CARE
Problem: Chronic Conditions and Co-morbidities  Goal: Patient's chronic conditions and co-morbidity symptoms are monitored and maintained or improved  Flowsheets (Taken 5/18/2023 6884)  Care Plan - Patient's Chronic Conditions and Co-Morbidity Symptoms are Monitored and Maintained or Improved: Monitor and assess patient's chronic conditions and comorbid symptoms for stability, deterioration, or improvement

## 2023-05-18 NOTE — PLAN OF CARE
Problem: Chronic Conditions and Co-morbidities  Goal: Patient's chronic conditions and co-morbidity symptoms are monitored and maintained or improved  5/18/2023 1107 by Tess Regalado RN  Outcome: Progressing  5/18/2023 0659 by William Holliday RN  Flowsheets (Taken 5/18/2023 2382)  Care Plan - Patient's Chronic Conditions and Co-Morbidity Symptoms are Monitored and Maintained or Improved: Monitor and assess patient's chronic conditions and comorbid symptoms for stability, deterioration, or improvement

## 2023-05-18 NOTE — PROGRESS NOTES
Congestive Heart Failure [id number] [facility] Dov   1960          Referring Provider: Nigel ANDERSON  Primary Care Physician: Dr. Waldemar Viera  Cardiologist: Dr. [facility]  Nephrologist:         History of Present Illness:     Russell Padilla is a 65 y.o. female with a history of HFrEF, most recent EF 60%  from 4/24/23    Patient Story:    She does not have dyspnea with exertion, shortness of breath, or decline in overall functional capacity. She does not have orthopnea, PND, nocturnal cough or hemoptysis. She does have abdominal distention or bloating, early satiety, anorexia/change in appetite. She does have a good urinary response to oral diuretic when she takes it, ordered prn, last time she took dose was YESTERDAY She DOES have NON PITTING of lower extremity edema . She denies lightheadedness, dizziness. She denies palpitations, syncope or near syncope. She does not complain of chest pain, pressure, discomfort. Allergies   Allergen Reactions    Penicillins Anaphylaxis    Penicillins            Prior to Visit Medications    Medication Sig Taking? Authorizing Provider   FARXIGA 10 MG tablet take 1 tablet by mouth once daily  Mook Israel MD   sacubitril-valsartan (ENTRESTO)  MG per tablet take 1 tablet by mouth twice a day  Mook Israel MD   spironolactone (ALDACTONE) 25 MG tablet take 1 tablet by mouth once daily  Mook Israel MD   metoprolol succinate (TOPROL XL) 25 MG extended release tablet Take 1 tablet by mouth daily  Mook Israel MD   furosemide (LASIX) 40 MG tablet take 1 tablet by mouth daily if needed (WEIGHT GAIN, SHORTNESS OF BREATH, SWELLING)  Mook Israel MD   acetaminophen (TYLENOL) 500 MG tablet Take 2 tablets by mouth every 6 hours as needed for Pain or Fever Maximum dose- 8 tablets/24 hours.   MEHDI Whitehead CNP   meloxicam (MOBIC) 15 MG tablet Take 15 mg by mouth

## 2023-07-10 DIAGNOSIS — I50.20 HFREF (HEART FAILURE WITH REDUCED EJECTION FRACTION) (HCC): ICD-10-CM

## 2023-07-10 DIAGNOSIS — Z79.899 MEDICATION DOSE INCREASED: ICD-10-CM

## 2023-07-14 ENCOUNTER — HOSPITAL ENCOUNTER (OUTPATIENT)
Age: 63
Discharge: HOME | End: 2023-07-14
Payer: COMMERCIAL

## 2023-07-14 LAB
ALBUMIN SERPL-MCNC: 4.1 G/DL (ref 3.5–5.2)
ALP SERPL-CCNC: 121 U/L (ref 35–104)
ALT SERPL-CCNC: 9 U/L (ref 0–32)
ANION GAP SERPL CALCULATED.3IONS-SCNC: 13 MMOL/L (ref 7–16)
AST SERPL-CCNC: 14 U/L (ref 0–31)
BASOPHILS # BLD: 0.02 E9/L (ref 0–0.2)
BASOPHILS NFR BLD: 0.5 % (ref 0–2)
BILIRUB SERPL-MCNC: 0.5 MG/DL (ref 0–1.2)
BUN SERPL-MCNC: 10 MG/DL (ref 6–23)
CALCIUM SERPL-MCNC: 9.2 MG/DL (ref 8.6–10.2)
CHLORIDE SERPL-SCNC: 109 MMOL/L (ref 98–107)
CO2 SERPL-SCNC: 24 MMOL/L (ref 22–29)
CREAT SERPL-MCNC: 1 MG/DL (ref 0.5–1)
EOSINOPHIL # BLD: 0.46 E9/L (ref 0.05–0.5)
EOSINOPHIL NFR BLD: 12.6 % (ref 0–6)
ERYTHROCYTE [DISTWIDTH] IN BLOOD BY AUTOMATED COUNT: 12.5 FL (ref 11.5–15)
GLUCOSE SERPL-MCNC: 95 MG/DL (ref 74–99)
HBA1C MFR BLD: 5.5 % (ref 4–5.6)
HCT VFR BLD AUTO: 39.8 % (ref 34–48)
HGB BLD-MCNC: 12.9 G/DL (ref 11.5–15.5)
IMM GRANULOCYTES # BLD: 0.01 E9/L
IMM GRANULOCYTES NFR BLD: 0.3 % (ref 0–5)
LYMPHOCYTES # BLD: 0.65 E9/L (ref 1.5–4)
LYMPHOCYTES NFR BLD: 17.8 % (ref 20–42)
MCH RBC QN AUTO: 30.5 PG (ref 26–35)
MCHC RBC AUTO-ENTMCNC: 32.4 % (ref 32–34.5)
MCV RBC AUTO: 94.1 FL (ref 80–99.9)
MONOCYTES # BLD: 0.38 E9/L (ref 0.1–0.95)
MONOCYTES NFR BLD: 10.4 % (ref 2–12)
NEUTROPHILS # BLD: 2.13 E9/L (ref 1.8–7.3)
NEUTS SEG NFR BLD: 58.4 % (ref 43–80)
PLATELET # BLD AUTO: 205 E9/L (ref 130–450)
PMV BLD AUTO: 10.3 FL (ref 7–12)
POTASSIUM SERPL-SCNC: 4.4 MMOL/L (ref 3.5–5)
PROT SERPL-MCNC: 6.9 G/DL (ref 6.4–8.3)
RBC # BLD AUTO: 4.23 E12/L (ref 3.5–5.5)
SODIUM SERPL-SCNC: 146 MMOL/L (ref 132–146)
WBC # BLD: 3.7 E9/L (ref 4.5–11.5)

## 2023-07-14 PROCEDURE — 80053 COMPREHEN METABOLIC PANEL: CPT

## 2023-07-14 PROCEDURE — 83036 HEMOGLOBIN GLYCOSYLATED A1C: CPT

## 2023-07-14 PROCEDURE — 36415 COLL VENOUS BLD VENIPUNCTURE: CPT

## 2023-07-14 PROCEDURE — 85025 COMPLETE CBC W/AUTO DIFF WBC: CPT

## 2023-07-19 ENCOUNTER — OFFICE VISIT (OUTPATIENT)
Dept: CARDIOLOGY CLINIC | Age: 63
End: 2023-07-19
Payer: COMMERCIAL

## 2023-07-19 VITALS
RESPIRATION RATE: 18 BRPM | DIASTOLIC BLOOD PRESSURE: 68 MMHG | WEIGHT: 204.8 LBS | SYSTOLIC BLOOD PRESSURE: 116 MMHG | HEIGHT: 68 IN | BODY MASS INDEX: 31.04 KG/M2 | HEART RATE: 62 BPM

## 2023-07-19 DIAGNOSIS — I50.20 HFREF (HEART FAILURE WITH REDUCED EJECTION FRACTION) (HCC): Primary | ICD-10-CM

## 2023-07-19 PROCEDURE — 99212 OFFICE O/P EST SF 10 MIN: CPT | Performed by: NURSE PRACTITIONER

## 2023-07-19 PROCEDURE — 93000 ELECTROCARDIOGRAM COMPLETE: CPT | Performed by: NURSE PRACTITIONER

## 2023-07-19 NOTE — PATIENT INSTRUCTIONS
Continue same heart medications  Follow-up with Dr Cuca Diaz or MEHDI in 6-9 months, sooner if any needs arise.

## 2023-07-28 NOTE — RESULT ENCOUNTER NOTE
Labs reviewed     Current GDMT:  Entresto 24/26 mg BID  Toprol 50 mg daily (limited titration secondary to HR)  Spironolactone 25 mg daily   Lasix 40 mg daily     Follow up labs after starting spironolactone stable   Continue current management, follow up labs at scheduled CHF clinic appointment No

## 2023-08-02 DIAGNOSIS — Z79.899 NEW MEDICATION ADDED: ICD-10-CM

## 2023-08-02 DIAGNOSIS — I50.20 HFREF (HEART FAILURE WITH REDUCED EJECTION FRACTION) (HCC): ICD-10-CM

## 2023-08-03 RX ORDER — DAPAGLIFLOZIN 10 MG/1
TABLET, FILM COATED ORAL
Qty: 90 TABLET | Refills: 1 | Status: SHIPPED | OUTPATIENT
Start: 2023-08-03

## 2023-08-24 ENCOUNTER — HOSPITAL ENCOUNTER (OUTPATIENT)
Dept: OTHER | Age: 63
Setting detail: THERAPIES SERIES
Discharge: HOME | End: 2023-08-24
Payer: COMMERCIAL

## 2023-08-24 VITALS
DIASTOLIC BLOOD PRESSURE: 67 MMHG | RESPIRATION RATE: 18 BRPM | SYSTOLIC BLOOD PRESSURE: 143 MMHG | BODY MASS INDEX: 33.6 KG/M2 | HEART RATE: 59 BPM | WEIGHT: 221 LBS | OXYGEN SATURATION: 98 %

## 2023-08-24 LAB
ANION GAP SERPL CALCULATED.3IONS-SCNC: 13 MMOL/L (ref 7–16)
BNP SERPL-MCNC: 211 PG/ML (ref 0–125)
BUN SERPL-MCNC: 11 MG/DL (ref 6–23)
CALCIUM SERPL-MCNC: 9 MG/DL (ref 8.6–10.2)
CHLORIDE SERPL-SCNC: 106 MMOL/L (ref 98–107)
CO2 SERPL-SCNC: 22 MMOL/L (ref 22–29)
CREAT SERPL-MCNC: 0.8 MG/DL (ref 0.5–1)
GFR SERPL CREATININE-BSD FRML MDRD: >60 ML/MIN/1.73M2
GLUCOSE SERPL-MCNC: 101 MG/DL (ref 74–99)
POTASSIUM SERPL-SCNC: 3.8 MMOL/L (ref 3.5–5)
SODIUM SERPL-SCNC: 141 MMOL/L (ref 132–146)

## 2023-08-24 PROCEDURE — 80048 BASIC METABOLIC PNL TOTAL CA: CPT

## 2023-08-24 PROCEDURE — 83880 ASSAY OF NATRIURETIC PEPTIDE: CPT

## 2023-08-24 PROCEDURE — 99214 OFFICE O/P EST MOD 30 MIN: CPT

## 2023-08-24 ASSESSMENT — PATIENT HEALTH QUESTIONNAIRE - PHQ9
1. LITTLE INTEREST OR PLEASURE IN DOING THINGS: NOT AT ALL
2. FEELING DOWN, DEPRESSED OR HOPELESS: NOT AT ALL
SUM OF ALL RESPONSES TO PHQ9 QUESTIONS 1 & 2: 0

## 2023-08-24 NOTE — PLAN OF CARE
Problem: Chronic Conditions and Co-morbidities  Goal: Patient's chronic conditions and co-morbidity symptoms are monitored and maintained or improved  Flowsheets (Taken 8/24/2023 0846)  Care Plan - Patient's Chronic Conditions and Co-Morbidity Symptoms are Monitored and Maintained or Improved: Monitor and assess patient's chronic conditions and comorbid symptoms for stability, deterioration, or improvement

## 2023-08-24 NOTE — PROGRESS NOTES
CREATININE, GLUCOSE in the last 72 hours. Hepatic: No results for input(s): AST, ALT, ALB, BILITOT, ALKPHOS in the last 72 hours. Troponin: No results for input(s): TROPONINI in the last 72 hours. BNP: No results for input(s): BNP in the last 72 hours. Lipids: No results for input(s): CHOL, HDL in the last 72 hours. Invalid input(s): LDLCALCU  INR: No results for input(s): INR in the last 72 hours. WEIGHTS:    Wt Readings from Last 3 Encounters:   08/24/23 221 lb (100.2 kg)   07/19/23 204 lb 12.8 oz (92.9 kg)   05/18/23 218 lb (98.9 kg)         TELEMETRY:  Cardiac Regularity: Regular  Cardiac Rhythm/Interpretation: SR        ASSESSMENT:  Mirela Monae WEIGHT IS stable. Pt. Denies any discomfort. Admits to weighing self daily. Pt. Kristel Hernández took prn dose of oral diuretic about 3 weeks ago. JVD Negative. Pt confirmed will call for earlier appt. If needed. Interventions completed this visit:  IV diuretics given: NO  Lab work obtained: Yes , BMP/BNP    Reviewed currently prescribed medications with patient, educated on importance of compliance and answered any questions regarding their medication  Educated on signs and symptoms of HF  Educated on low sodium diet    PLAN:  Scheduled to follow up in CHF clinic on   Future Appointments   Date Time Provider [address]   12/6/2023 10:30 AM [facility] CHF ROOM 1 [facility]       Given clinic phone number and aware of signs and symptoms to call with any HF change in symptoms.

## 2023-09-07 ENCOUNTER — HOSPITAL ENCOUNTER (OUTPATIENT)
Age: 63
Discharge: HOME | End: 2023-09-09
Payer: COMMERCIAL

## 2023-09-07 ENCOUNTER — HOSPITAL ENCOUNTER (OUTPATIENT)
Dept: GENERAL RADIOLOGY | Age: 63
Discharge: HOME | End: 2023-09-09
Payer: COMMERCIAL

## 2023-09-07 DIAGNOSIS — R52 PAIN: ICD-10-CM

## 2023-09-07 PROCEDURE — 73560 X-RAY EXAM OF KNEE 1 OR 2: CPT

## 2023-11-29 DIAGNOSIS — Z79.899 NEW MEDICATION ADDED: ICD-10-CM

## 2023-11-29 DIAGNOSIS — I50.9 ACUTE HEART FAILURE, UNSPECIFIED HEART FAILURE TYPE (HCC): ICD-10-CM

## 2023-12-06 ENCOUNTER — HOSPITAL ENCOUNTER (OUTPATIENT)
Dept: OTHER | Age: 63
Setting detail: THERAPIES SERIES
Discharge: HOME | End: 2023-12-06

## 2023-12-06 NOTE — PLAN OF CARE
Problem: Chronic Conditions and Co-morbidities  Goal: Patient's chronic conditions and co-morbidity symptoms are monitored and maintained or improved  Flowsheets (Taken 12/6/2023 6006)  Care Plan - Patient's Chronic Conditions and Co-Morbidity Symptoms are Monitored and Maintained or Improved: Monitor and assess patient's chronic conditions and comorbid symptoms for stability, deterioration, or improvement

## 2024-01-03 DIAGNOSIS — I50.20 HFREF (HEART FAILURE WITH REDUCED EJECTION FRACTION) (HCC): ICD-10-CM

## 2024-01-03 DIAGNOSIS — Z79.899 NEW MEDICATION ADDED: ICD-10-CM

## 2024-01-03 DIAGNOSIS — I50.9 ACUTE HEART FAILURE, UNSPECIFIED HEART FAILURE TYPE (HCC): ICD-10-CM

## 2024-01-03 DIAGNOSIS — Z79.899 MEDICATION DOSE INCREASED: ICD-10-CM

## 2024-01-03 RX ORDER — SPIRONOLACTONE 25 MG/1
TABLET ORAL
Qty: 90 TABLET | Refills: 1 | OUTPATIENT
Start: 2024-01-03

## 2024-01-03 RX ORDER — SPIRONOLACTONE 25 MG/1
TABLET ORAL
Qty: 90 TABLET | Refills: 1 | Status: SHIPPED | OUTPATIENT
Start: 2024-01-03

## 2024-01-03 RX ORDER — SACUBITRIL AND VALSARTAN 97; 103 MG/1; MG/1
TABLET, FILM COATED ORAL
Qty: 180 TABLET | Refills: 3 | Status: SHIPPED | OUTPATIENT
Start: 2024-01-03

## 2024-01-12 ENCOUNTER — HOSPITAL ENCOUNTER (OUTPATIENT)
Dept: OTHER | Age: 64
Setting detail: THERAPIES SERIES
Discharge: HOME | End: 2024-01-12
Payer: COMMERCIAL

## 2024-01-12 VITALS
BODY MASS INDEX: 34.21 KG/M2 | WEIGHT: 225 LBS | OXYGEN SATURATION: 100 % | HEART RATE: 56 BPM | RESPIRATION RATE: 18 BRPM | SYSTOLIC BLOOD PRESSURE: 117 MMHG | DIASTOLIC BLOOD PRESSURE: 65 MMHG

## 2024-01-12 LAB
ANION GAP SERPL CALCULATED.3IONS-SCNC: 11 MMOL/L (ref 7–16)
BNP SERPL-MCNC: 97 PG/ML (ref 0–125)
BUN SERPL-MCNC: 11 MG/DL (ref 6–23)
CALCIUM SERPL-MCNC: 9.1 MG/DL (ref 8.6–10.2)
CHLORIDE SERPL-SCNC: 105 MMOL/L (ref 98–107)
CO2 SERPL-SCNC: 26 MMOL/L (ref 22–29)
CREAT SERPL-MCNC: 1 MG/DL (ref 0.5–1)
GFR SERPL CREATININE-BSD FRML MDRD: >60 ML/MIN/1.73M2
GLUCOSE SERPL-MCNC: 87 MG/DL (ref 74–99)
POTASSIUM SERPL-SCNC: 4 MMOL/L (ref 3.5–5)
SODIUM SERPL-SCNC: 142 MMOL/L (ref 132–146)

## 2024-01-12 PROCEDURE — 83880 ASSAY OF NATRIURETIC PEPTIDE: CPT

## 2024-01-12 PROCEDURE — 80048 BASIC METABOLIC PNL TOTAL CA: CPT

## 2024-01-12 PROCEDURE — 99214 OFFICE O/P EST MOD 30 MIN: CPT

## 2024-01-12 ASSESSMENT — PATIENT HEALTH QUESTIONNAIRE - PHQ9
2. FEELING DOWN, DEPRESSED OR HOPELESS: NOT AT ALL
SUM OF ALL RESPONSES TO PHQ9 QUESTIONS 1 & 2: 0
1. LITTLE INTEREST OR PLEASURE IN DOING THINGS: NOT AT ALL

## 2024-01-12 NOTE — FLOWSHEET NOTE
01/12/24 0959   Over the past 2 weeks, how often have you been bothered by any of the following problems?   Little interest or pleasure in doing things Not at all   Feeling down, depressed, or hopeless Not at all   Patient Health Questionnaire-2 Score 0

## 2024-01-12 NOTE — PROGRESS NOTES
Congestive Heart Failure Clinic   [facility]       Referring Provider: OZZIE Auguste  Primary Care Physician: Ferny Nelson MD   Cardiologist: Dr Esquivel  Nephrologist: ISMAEL      HISTORY OF PRESENT ILLNESS:     Mirela Monae is a 63 y.o. (1960) female with a history of HFpEF (EF> 50%), most recent EF:  Lab Results   Component Value Date    LVEF 60 04/24/2023         She presents to the CHF clinic for ongoing evaluation and monitoring of heart failure.    In the CHF clinic today she denies any adverse symptoms except:  [] Dizziness or lightheadedness   [] Syncope or near syncope  [] Recent Fall  [] Shortness of breath at rest   [x] Dyspnea with exertion (occasionally )  [] Decline in functional capacity (unable to perform activities they had previously been able to do)  [x] Fatigue   [] Orthopnea  [] PND  [] Nocturnal cough  [] Hemoptysis  [] Chest pain, pressure, or discomfort  [] Palpitations  [] Abdominal distention  [] Abdominal bloating  [] Early satiety  [] Blood in stool   [] Diarrhea  [] Constipation  [] Nausea/Vomiting  [] Decreased urinary response to oral diuretic   [] Scrotal swelling   [x] Lower extremity edema ( very slight )  [] Used PRN doses of oral diuretic   [] Weight gain    Wt Readings from Last 3 Encounters:   01/12/24 102.1 kg (225 lb)   08/24/23 100.2 kg (221 lb)   07/19/23 92.9 kg (204 lb 12.8 oz)           SOCIAL HISTORY:  [x] Denies tobacco, alcohol or illicit drug abuse  [] Tobacco use:  [] ETOH use:  [] Illicit drug use:        MEDICATIONS:    Allergies   Allergen Reactions    Penicillins Anaphylaxis    Penicillins      Prior to Visit Medications    Medication Sig Taking? Authorizing Provider   ENTRESTO  MG per tablet take 1 tablet by mouth twice a day  Manuel Esquivel MD   spironolactone (ALDACTONE) 25 MG tablet take 1 tablet by mouth once daily  Patient taking differently: Going today to  today  Manuel Esquivel MD FARXIGA

## 2024-01-12 NOTE — PLAN OF CARE
Problem: Chronic Conditions and Co-morbidities  Goal: Patient's chronic conditions and co-morbidity symptoms are monitored and maintained or improved  Flowsheets (Taken 1/12/2024 4227)  Care Plan - Patient's Chronic Conditions and Co-Morbidity Symptoms are Monitored and Maintained or Improved: Monitor and assess patient's chronic conditions and comorbid symptoms for stability, deterioration, or improvement

## 2024-02-06 DIAGNOSIS — I50.20 HFREF (HEART FAILURE WITH REDUCED EJECTION FRACTION) (HCC): ICD-10-CM

## 2024-02-06 DIAGNOSIS — Z79.899 NEW MEDICATION ADDED: ICD-10-CM

## 2024-02-06 RX ORDER — DAPAGLIFLOZIN 10 MG/1
TABLET, FILM COATED ORAL
Qty: 90 TABLET | Refills: 1 | Status: SHIPPED | OUTPATIENT
Start: 2024-02-06

## 2024-02-12 ENCOUNTER — TELEPHONE (OUTPATIENT)
Dept: CARDIOLOGY CLINIC | Age: 64
End: 2024-02-12

## 2024-04-04 ENCOUNTER — HOSPITAL ENCOUNTER (OUTPATIENT)
Dept: GENERAL RADIOLOGY | Age: 64
Discharge: HOME | End: 2024-04-06
Payer: COMMERCIAL

## 2024-04-04 VITALS — WEIGHT: 225 LBS | HEIGHT: 68 IN | BODY MASS INDEX: 34.1 KG/M2

## 2024-04-04 DIAGNOSIS — Z12.31 ENCOUNTER FOR SCREENING MAMMOGRAM FOR MALIGNANT NEOPLASM OF BREAST: ICD-10-CM

## 2024-04-04 PROCEDURE — 77063 BREAST TOMOSYNTHESIS BI: CPT

## 2024-04-25 ENCOUNTER — HOSPITAL ENCOUNTER (OUTPATIENT)
Dept: OTHER | Age: 64
Setting detail: THERAPIES SERIES
Discharge: HOME | End: 2024-04-25
Payer: COMMERCIAL

## 2024-04-25 VITALS
WEIGHT: 221 LBS | HEART RATE: 70 BPM | BODY MASS INDEX: 33.6 KG/M2 | RESPIRATION RATE: 18 BRPM | DIASTOLIC BLOOD PRESSURE: 61 MMHG | SYSTOLIC BLOOD PRESSURE: 137 MMHG

## 2024-04-25 LAB
ANION GAP SERPL CALCULATED.3IONS-SCNC: 11 MMOL/L (ref 7–16)
BNP SERPL-MCNC: 80 PG/ML (ref 0–125)
BUN SERPL-MCNC: 15 MG/DL (ref 6–23)
CALCIUM SERPL-MCNC: 9.3 MG/DL (ref 8.6–10.2)
CHLORIDE SERPL-SCNC: 103 MMOL/L (ref 98–107)
CO2 SERPL-SCNC: 24 MMOL/L (ref 22–29)
CREAT SERPL-MCNC: 1 MG/DL (ref 0.5–1)
GFR SERPL CREATININE-BSD FRML MDRD: 67 ML/MIN/1.73M2
GLUCOSE SERPL-MCNC: 94 MG/DL (ref 74–99)
POTASSIUM SERPL-SCNC: 3.5 MMOL/L (ref 3.5–5)
SODIUM SERPL-SCNC: 138 MMOL/L (ref 132–146)

## 2024-04-25 PROCEDURE — 80048 BASIC METABOLIC PNL TOTAL CA: CPT

## 2024-04-25 PROCEDURE — 83880 ASSAY OF NATRIURETIC PEPTIDE: CPT

## 2024-04-25 PROCEDURE — 99214 OFFICE O/P EST MOD 30 MIN: CPT

## 2024-04-25 ASSESSMENT — PATIENT HEALTH QUESTIONNAIRE - PHQ9
SUM OF ALL RESPONSES TO PHQ9 QUESTIONS 1 & 2: 0
2. FEELING DOWN, DEPRESSED OR HOPELESS: NOT AT ALL
SUM OF ALL RESPONSES TO PHQ QUESTIONS 1-9: 0
SUM OF ALL RESPONSES TO PHQ QUESTIONS 1-9: 0
1. LITTLE INTEREST OR PLEASURE IN DOING THINGS: NOT AT ALL
SUM OF ALL RESPONSES TO PHQ QUESTIONS 1-9: 0
SUM OF ALL RESPONSES TO PHQ QUESTIONS 1-9: 0

## 2024-04-25 NOTE — PLAN OF CARE
Problem: Chronic Conditions and Co-morbidities  Goal: Patient's chronic conditions and co-morbidity symptoms are monitored and maintained or improved  Flowsheets (Taken 4/25/2024 1279)  Care Plan - Patient's Chronic Conditions and Co-Morbidity Symptoms are Monitored and Maintained or Improved: Monitor and assess patient's chronic conditions and comorbid symptoms for stability, deterioration, or improvement

## 2024-04-25 NOTE — PROGRESS NOTES
Congestive Heart Failure Clinic   [facility]       Referring Provider: OZZIE Auguste  Primary Care Physician: Ferny Nelson MD   Cardiologist: Dr Esquivel  Nephrologist: ISMAEL      HISTORY OF PRESENT ILLNESS:     Mirela Monae is a 63 y.o. (1960) female with a history of HFpEF (EF> 50%), most recent EF:  Lab Results   Component Value Date    LVEF 60 04/24/2023           Samples given to patient: at CHF Clinic of   Med name: Farxiga     Dosage: 10  mg     Quantity: 2 boxes  Lot number: [id number]  Exp. date: 10/31/2025  Instructions: take on daily    4/25/24- samples given checking pt assistance with , Theresa. Pt states her med was 40.00, now 108.00           She presents to the CHF clinic for ongoing evaluation and monitoring of heart failure.    In the CHF clinic today she denies any adverse symptoms except:  [] Dizziness or lightheadedness   [] Syncope or near syncope  [] Recent Fall  [] Shortness of breath at rest   [x] Dyspnea with exertion (occasionally )  [] Decline in functional capacity (unable to perform activities they had previously been able to do)  [] Fatigue   [] Orthopnea  [] PND  [] Nocturnal cough  [] Hemoptysis  [] Chest pain, pressure, or discomfort  [] Palpitations  [] Abdominal distention  [] Abdominal bloating  [] Early satiety  [] Blood in stool   [] Diarrhea  [] Constipation  [] Nausea/Vomiting  [] Decreased urinary response to oral diuretic   [] Scrotal swelling   [] Lower extremity edema ( very slight )  [] Used PRN doses of oral diuretic   [] Weight gain    Wt Readings from Last 3 Encounters:   04/25/24 100.2 kg (221 lb)   04/04/24 102.1 kg (225 lb)   01/12/24 102.1 kg (225 lb)           SOCIAL HISTORY:  [x] Denies tobacco, alcohol or illicit drug abuse  [] Tobacco use:  [] ETOH use:  [] Illicit drug use:        MEDICATIONS:    Allergies   Allergen Reactions    Penicillins Anaphylaxis    Penicillins      Prior to Visit Medications

## 2024-07-17 DIAGNOSIS — I50.9 ACUTE HEART FAILURE, UNSPECIFIED HEART FAILURE TYPE (HCC): ICD-10-CM

## 2024-07-17 DIAGNOSIS — Z79.899 NEW MEDICATION ADDED: ICD-10-CM

## 2024-07-17 RX ORDER — SPIRONOLACTONE 25 MG/1
TABLET ORAL
Qty: 90 TABLET | Refills: 1 | Status: SHIPPED | OUTPATIENT
Start: 2024-07-17

## 2024-07-20 ENCOUNTER — HOSPITAL ENCOUNTER (EMERGENCY)
Age: 64
Discharge: HOME | End: 2024-07-20
Attending: EMERGENCY MEDICINE
Payer: COMMERCIAL

## 2024-07-20 ENCOUNTER — APPOINTMENT (OUTPATIENT)
Dept: GENERAL RADIOLOGY | Age: 64
End: 2024-07-20
Payer: COMMERCIAL

## 2024-07-20 VITALS
RESPIRATION RATE: 18 BRPM | HEART RATE: 64 BPM | OXYGEN SATURATION: 97 % | SYSTOLIC BLOOD PRESSURE: 138 MMHG | TEMPERATURE: 97.4 F | DIASTOLIC BLOOD PRESSURE: 84 MMHG

## 2024-07-20 DIAGNOSIS — R00.2 PALPITATIONS: Primary | ICD-10-CM

## 2024-07-20 LAB
ALBUMIN SERPL-MCNC: 4.4 G/DL (ref 3.5–5.2)
ALP SERPL-CCNC: 120 U/L (ref 35–104)
ALT SERPL-CCNC: 12 U/L (ref 0–32)
ANION GAP SERPL CALCULATED.3IONS-SCNC: 13 MMOL/L (ref 7–16)
AST SERPL-CCNC: 17 U/L (ref 0–31)
B PARAP IS1001 DNA NPH QL NAA+NON-PROBE: NOT DETECTED
B PERT DNA SPEC QL NAA+PROBE: NOT DETECTED
BASOPHILS # BLD: 0.02 K/UL (ref 0–0.2)
BASOPHILS NFR BLD: 0 % (ref 0–2)
BILIRUB SERPL-MCNC: 0.5 MG/DL (ref 0–1.2)
BNP SERPL-MCNC: 67 PG/ML (ref 0–125)
BUN SERPL-MCNC: 17 MG/DL (ref 6–23)
C PNEUM DNA NPH QL NAA+NON-PROBE: NOT DETECTED
CALCIUM SERPL-MCNC: 9.2 MG/DL (ref 8.6–10.2)
CHLORIDE SERPL-SCNC: 101 MMOL/L (ref 98–107)
CO2 SERPL-SCNC: 24 MMOL/L (ref 22–29)
CREAT SERPL-MCNC: 1 MG/DL (ref 0.5–1)
EOSINOPHIL # BLD: 0.1 K/UL (ref 0.05–0.5)
EOSINOPHILS RELATIVE PERCENT: 2 % (ref 0–6)
ERYTHROCYTE [DISTWIDTH] IN BLOOD BY AUTOMATED COUNT: 12.6 % (ref 11.5–15)
FLUAV RNA NPH QL NAA+NON-PROBE: NOT DETECTED
FLUBV RNA NPH QL NAA+NON-PROBE: NOT DETECTED
GFR, ESTIMATED: 63 ML/MIN/1.73M2
GLUCOSE SERPL-MCNC: 91 MG/DL (ref 74–99)
HADV DNA NPH QL NAA+NON-PROBE: NOT DETECTED
HCOV 229E RNA NPH QL NAA+NON-PROBE: NOT DETECTED
HCOV HKU1 RNA NPH QL NAA+NON-PROBE: NOT DETECTED
HCOV NL63 RNA NPH QL NAA+NON-PROBE: NOT DETECTED
HCOV OC43 RNA NPH QL NAA+NON-PROBE: NOT DETECTED
HCT VFR BLD AUTO: 41.5 % (ref 34–48)
HGB BLD-MCNC: 13.3 G/DL (ref 11.5–15.5)
HMPV RNA NPH QL NAA+NON-PROBE: NOT DETECTED
HPIV1 RNA NPH QL NAA+NON-PROBE: NOT DETECTED
HPIV2 RNA NPH QL NAA+NON-PROBE: NOT DETECTED
HPIV3 RNA NPH QL NAA+NON-PROBE: NOT DETECTED
HPIV4 RNA NPH QL NAA+NON-PROBE: NOT DETECTED
IMM GRANULOCYTES # BLD AUTO: <0.03 K/UL (ref 0–0.58)
IMM GRANULOCYTES NFR BLD: 0 % (ref 0–5)
LACTATE BLDV-SCNC: 0.8 MMOL/L (ref 0.5–2.2)
LYMPHOCYTES NFR BLD: 1.3 K/UL (ref 1.5–4)
LYMPHOCYTES RELATIVE PERCENT: 27 % (ref 20–42)
M PNEUMO DNA NPH QL NAA+NON-PROBE: NOT DETECTED
MCH RBC QN AUTO: 29.5 PG (ref 26–35)
MCHC RBC AUTO-ENTMCNC: 32 G/DL (ref 32–34.5)
MCV RBC AUTO: 92 FL (ref 80–99.9)
MONOCYTES NFR BLD: 0.42 K/UL (ref 0.1–0.95)
MONOCYTES NFR BLD: 9 % (ref 2–12)
NEUTROPHILS NFR BLD: 62 % (ref 43–80)
NEUTS SEG NFR BLD: 2.96 K/UL (ref 1.8–7.3)
PLATELET # BLD AUTO: 222 K/UL (ref 130–450)
PMV BLD AUTO: 11.2 FL (ref 7–12)
POTASSIUM SERPL-SCNC: 4 MMOL/L (ref 3.5–5)
PROT SERPL-MCNC: 8.1 G/DL (ref 6.4–8.3)
RBC # BLD AUTO: 4.51 M/UL (ref 3.5–5.5)
RSV RNA NPH QL NAA+NON-PROBE: NOT DETECTED
RV+EV RNA NPH QL NAA+NON-PROBE: NOT DETECTED
SARS-COV-2 RNA NPH QL NAA+NON-PROBE: NOT DETECTED
SODIUM SERPL-SCNC: 138 MMOL/L (ref 132–146)
SPECIMEN DESCRIPTION: NORMAL
TROPONIN I SERPL HS-MCNC: 8 NG/L (ref 0–9)
TROPONIN I SERPL HS-MCNC: 9 NG/L (ref 0–9)
WBC OTHER # BLD: 4.8 K/UL (ref 4.5–11.5)

## 2024-07-20 PROCEDURE — 83605 ASSAY OF LACTIC ACID: CPT

## 2024-07-20 PROCEDURE — 99285 EMERGENCY DEPT VISIT HI MDM: CPT

## 2024-07-20 PROCEDURE — 0202U NFCT DS 22 TRGT SARS-COV-2: CPT

## 2024-07-20 PROCEDURE — 83880 ASSAY OF NATRIURETIC PEPTIDE: CPT

## 2024-07-20 PROCEDURE — 71045 X-RAY EXAM CHEST 1 VIEW: CPT

## 2024-07-20 PROCEDURE — 80053 COMPREHEN METABOLIC PANEL: CPT

## 2024-07-20 PROCEDURE — 85025 COMPLETE CBC W/AUTO DIFF WBC: CPT

## 2024-07-20 PROCEDURE — 84484 ASSAY OF TROPONIN QUANT: CPT

## 2024-07-20 NOTE — ED NOTES
Ambulated patient 50 feet without assistance. Patients gait was brisk and steady with no increase of work of breathing. Patient SpO2 was 95% and had a pulse of 50 beats per minute prior to walking. Patient SPO2 was 95% and her heart rate was 78 upon ambulation. Patient did not report any feelings of palpations or SOB while ambulating

## 2024-07-21 LAB
EKG ATRIAL RATE: 57 BPM
EKG P AXIS: 21 DEGREES
EKG P-R INTERVAL: 208 MS
EKG Q-T INTERVAL: 456 MS
EKG QRS DURATION: 94 MS
EKG QTC CALCULATION (BAZETT): 443 MS
EKG R AXIS: -5 DEGREES
EKG T AXIS: 44 DEGREES
EKG VENTRICULAR RATE: 57 BPM

## 2024-07-21 NOTE — ED PROVIDER NOTES
Department of Emergency Medicine   ED  Provider Note  Admit Date/RoomTime: 7/20/2024  3:42 PM  ED Room: PR1/PR1          History of Present Illness:  7/20/24, Time: 8:52 PM EDT  Chief Complaint   Patient presents with    Palpitations     X 1 week    Dizziness     Intermittent x 1 week    Shortness of Breath     With exertion                Mirela Monae is a 63 y.o. female presenting to the ED for palpitations.  Patient's been having palpitations for the past week.  Nothing makes it better or worse, no associated pain.  No chest pain, no cough or sputum, no fevers or chills.  No paresthesias.  She says she was short of breath exertion earlier in the week, but this has since resolved.  She does have a history of heart failure, she is known to the CHF clinic.  No leg swelling.  No other symptoms or complaints.      Review of Systems:   Pertinent positives and negatives are stated within HPI, all other systems reviewed and are negative.        --------------------------------------------- PAST HISTORY ---------------------------------------------  Past Medical History:  has a past medical history of Arthritis, CHF (congestive heart failure) (HCC), and Hypertension.    Past Surgical History:  has a past surgical history that includes Dilation and curettage of uterus; Tonsillectomy; Breast enhancement surgery; and hernia repair.    Social History:  reports that she has never smoked. She has never used smokeless tobacco. She reports current alcohol use. She reports that she does not use drugs.    Family History: family history includes Breast Cancer in her maternal cousin; Colon Cancer in her maternal grandmother; Heart Failure in her mother.. Unless otherwise noted, family history is non contributory    The patient’s home medications have been reviewed.    Allergies: Penicillins and Penicillins        ---------------------------------------------------PHYSICAL

## 2024-07-25 ENCOUNTER — HOSPITAL ENCOUNTER (OUTPATIENT)
Dept: OTHER | Age: 64
Setting detail: THERAPIES SERIES
Discharge: HOME | End: 2024-07-25
Payer: COMMERCIAL

## 2024-07-25 VITALS
BODY MASS INDEX: 32.99 KG/M2 | OXYGEN SATURATION: 98 % | HEART RATE: 82 BPM | RESPIRATION RATE: 19 BRPM | SYSTOLIC BLOOD PRESSURE: 112 MMHG | DIASTOLIC BLOOD PRESSURE: 74 MMHG | WEIGHT: 217 LBS

## 2024-07-25 LAB
ANION GAP SERPL CALCULATED.3IONS-SCNC: 12 MMOL/L (ref 7–16)
BNP SERPL-MCNC: 49 PG/ML (ref 0–125)
BUN SERPL-MCNC: 15 MG/DL (ref 6–23)
CALCIUM SERPL-MCNC: 9.5 MG/DL (ref 8.6–10.2)
CHLORIDE SERPL-SCNC: 104 MMOL/L (ref 98–107)
CO2 SERPL-SCNC: 26 MMOL/L (ref 22–29)
CREAT SERPL-MCNC: 1 MG/DL (ref 0.5–1)
GFR, ESTIMATED: 68 ML/MIN/1.73M2
GLUCOSE SERPL-MCNC: 99 MG/DL (ref 74–99)
POTASSIUM SERPL-SCNC: 3.7 MMOL/L (ref 3.5–5)
SODIUM SERPL-SCNC: 142 MMOL/L (ref 132–146)

## 2024-07-25 PROCEDURE — 80048 BASIC METABOLIC PNL TOTAL CA: CPT

## 2024-07-25 PROCEDURE — 83880 ASSAY OF NATRIURETIC PEPTIDE: CPT

## 2024-07-25 PROCEDURE — 99214 OFFICE O/P EST MOD 30 MIN: CPT

## 2024-07-25 NOTE — PLAN OF CARE
Problem: Chronic Conditions and Co-morbidities  Goal: Patient's chronic conditions and co-morbidity symptoms are monitored and maintained or improved  Flowsheets (Taken 7/25/2024 1024)  Care Plan - Patient's Chronic Conditions and Co-Morbidity Symptoms are Monitored and Maintained or Improved: Monitor and assess patient's chronic conditions and comorbid symptoms for stability, deterioration, or improvement

## 2024-07-25 NOTE — PROGRESS NOTES
Congestive Heart Failure Clinic   [facility]       Referring Provider: OZZIE Auguste  Primary Care Physician: Ferny Nelson MD   Cardiologist: Dr Esquivel  Nephrologist: ISMAEL      HISTORY OF PRESENT ILLNESS:     Mirela Monae is a 63 y.o. (1960) female with a history of HFpEF (EF> 50%), most recent EF:  Lab Results   Component Value Date    LVEF 60 04/24/2023                She presents to the CHF clinic for ongoing evaluation and monitoring of heart failure.    In the CHF clinic today she denies any adverse symptoms except:  [] Dizziness or lightheadedness   [] Syncope or near syncope  [] Recent Fall  [] Shortness of breath at rest   [x] Dyspnea with exertion (occasionally )  [] Decline in functional capacity (unable to perform activities they had previously been able to do)  [] Fatigue   [] Orthopnea  [] PND  [] Nocturnal cough  [] Hemoptysis  [] Chest pain, pressure, or discomfort  [] Palpitations  [] Abdominal distention  [] Abdominal bloating  [] Early satiety  [] Blood in stool   [] Diarrhea  [] Constipation  [] Nausea/Vomiting  [] Decreased urinary response to oral diuretic   [] Scrotal swelling   [] Lower extremity edema ( very slight )  [] Used PRN doses of oral diuretic   [] Weight gain    Wt Readings from Last 3 Encounters:   07/25/24 98.4 kg (217 lb)   04/25/24 100.2 kg (221 lb)   04/04/24 102.1 kg (225 lb)           SOCIAL HISTORY:  [x] Denies tobacco, alcohol or illicit drug abuse  [] Tobacco use:  [] ETOH use:  [] Illicit drug use:        MEDICATIONS:    Allergies   Allergen Reactions    Penicillins Anaphylaxis    Penicillins      Prior to Visit Medications    Medication Sig Taking? Authorizing Provider   spironolactone (ALDACTONE) 25 MG tablet TAKE 1 TABLET BY MOUTH ONCE DAILY  Manuel Esquivel MD   FARXIGA 10 MG tablet take 1 tablet by mouth once daily  Manuel Esquivel MD   ENTRESTO  MG per tablet take 1 tablet by mouth twice a day  Alvin

## 2024-08-29 ENCOUNTER — HOSPITAL ENCOUNTER (OUTPATIENT)
Age: 64
Discharge: HOME | End: 2024-08-29
Payer: COMMERCIAL

## 2024-08-29 LAB
25(OH)D3 SERPL-MCNC: 18.4 NG/ML (ref 30–100)
ALBUMIN SERPL-MCNC: 4.4 G/DL (ref 3.5–5.2)
ALP SERPL-CCNC: 119 U/L (ref 35–104)
ALT SERPL-CCNC: 10 U/L (ref 0–32)
ANION GAP SERPL CALCULATED.3IONS-SCNC: 10 MMOL/L (ref 7–16)
AST SERPL-CCNC: 15 U/L (ref 0–31)
BASOPHILS # BLD: 0.02 K/UL (ref 0–0.2)
BASOPHILS NFR BLD: 1 % (ref 0–2)
BILIRUB SERPL-MCNC: 0.5 MG/DL (ref 0–1.2)
BUN SERPL-MCNC: 10 MG/DL (ref 6–23)
CALCIUM SERPL-MCNC: 9.6 MG/DL (ref 8.6–10.2)
CHLORIDE SERPL-SCNC: 106 MMOL/L (ref 98–107)
CHOLEST SERPL-MCNC: 113 MG/DL
CO2 SERPL-SCNC: 26 MMOL/L (ref 22–29)
CREAT SERPL-MCNC: 1 MG/DL (ref 0.5–1)
EOSINOPHIL # BLD: 0.23 K/UL (ref 0.05–0.5)
EOSINOPHILS RELATIVE PERCENT: 6 % (ref 0–6)
ERYTHROCYTE [DISTWIDTH] IN BLOOD BY AUTOMATED COUNT: 12.3 % (ref 11.5–15)
GFR, ESTIMATED: 64 ML/MIN/1.73M2
GLUCOSE SERPL-MCNC: 88 MG/DL (ref 74–99)
HBA1C MFR BLD: 5.7 % (ref 4–5.6)
HCT VFR BLD AUTO: 39.4 % (ref 34–48)
HDLC SERPL-MCNC: 55 MG/DL
HGB BLD-MCNC: 13.1 G/DL (ref 11.5–15.5)
IMM GRANULOCYTES # BLD AUTO: <0.03 K/UL (ref 0–0.58)
IMM GRANULOCYTES NFR BLD: 0 % (ref 0–5)
LDLC SERPL CALC-MCNC: 42 MG/DL
LYMPHOCYTES NFR BLD: 1.24 K/UL (ref 1.5–4)
LYMPHOCYTES RELATIVE PERCENT: 32 % (ref 20–42)
MCH RBC QN AUTO: 30.8 PG (ref 26–35)
MCHC RBC AUTO-ENTMCNC: 33.2 G/DL (ref 32–34.5)
MCV RBC AUTO: 92.5 FL (ref 80–99.9)
MONOCYTES NFR BLD: 0.38 K/UL (ref 0.1–0.95)
MONOCYTES NFR BLD: 10 % (ref 2–12)
NEUTROPHILS NFR BLD: 52 % (ref 43–80)
NEUTS SEG NFR BLD: 2.04 K/UL (ref 1.8–7.3)
PLATELET # BLD AUTO: 209 K/UL (ref 130–450)
PMV BLD AUTO: 11 FL (ref 7–12)
POTASSIUM SERPL-SCNC: 4.1 MMOL/L (ref 3.5–5)
PROT SERPL-MCNC: 7.6 G/DL (ref 6.4–8.3)
RBC # BLD AUTO: 4.26 M/UL (ref 3.5–5.5)
SODIUM SERPL-SCNC: 142 MMOL/L (ref 132–146)
TRIGL SERPL-MCNC: 78 MG/DL
VLDLC SERPL CALC-MCNC: 16 MG/DL
WBC OTHER # BLD: 3.9 K/UL (ref 4.5–11.5)

## 2024-08-29 PROCEDURE — 36415 COLL VENOUS BLD VENIPUNCTURE: CPT

## 2024-08-29 PROCEDURE — 83036 HEMOGLOBIN GLYCOSYLATED A1C: CPT

## 2024-08-29 PROCEDURE — 85025 COMPLETE CBC W/AUTO DIFF WBC: CPT

## 2024-08-29 PROCEDURE — 80061 LIPID PANEL: CPT

## 2024-08-29 PROCEDURE — 80053 COMPREHEN METABOLIC PANEL: CPT

## 2024-08-29 PROCEDURE — 82306 VITAMIN D 25 HYDROXY: CPT

## 2024-10-24 ENCOUNTER — APPOINTMENT (OUTPATIENT)
Dept: OTHER | Age: 64
End: 2024-10-24
Payer: COMMERCIAL

## 2024-10-31 ENCOUNTER — HOSPITAL ENCOUNTER (OUTPATIENT)
Dept: OTHER | Age: 64
Setting detail: THERAPIES SERIES
Discharge: HOME | End: 2024-10-31
Payer: COMMERCIAL

## 2024-10-31 VITALS
HEART RATE: 56 BPM | DIASTOLIC BLOOD PRESSURE: 72 MMHG | RESPIRATION RATE: 18 BRPM | WEIGHT: 218 LBS | SYSTOLIC BLOOD PRESSURE: 121 MMHG | OXYGEN SATURATION: 99 % | BODY MASS INDEX: 33.15 KG/M2

## 2024-10-31 LAB
ANION GAP SERPL CALCULATED.3IONS-SCNC: 9 MMOL/L (ref 7–16)
BNP SERPL-MCNC: 73 PG/ML (ref 0–125)
BUN SERPL-MCNC: 12 MG/DL (ref 6–23)
CALCIUM SERPL-MCNC: 8.9 MG/DL (ref 8.6–10.2)
CHLORIDE SERPL-SCNC: 107 MMOL/L (ref 98–107)
CO2 SERPL-SCNC: 26 MMOL/L (ref 22–29)
CREAT SERPL-MCNC: 0.9 MG/DL (ref 0.5–1)
GFR, ESTIMATED: 74 ML/MIN/1.73M2
GLUCOSE SERPL-MCNC: 100 MG/DL (ref 74–99)
POTASSIUM SERPL-SCNC: 3.6 MMOL/L (ref 3.5–5)
SODIUM SERPL-SCNC: 142 MMOL/L (ref 132–146)

## 2024-10-31 PROCEDURE — 99214 OFFICE O/P EST MOD 30 MIN: CPT

## 2024-10-31 PROCEDURE — 80048 BASIC METABOLIC PNL TOTAL CA: CPT

## 2024-10-31 PROCEDURE — 83880 ASSAY OF NATRIURETIC PEPTIDE: CPT

## 2024-10-31 ASSESSMENT — PATIENT HEALTH QUESTIONNAIRE - PHQ9
SUM OF ALL RESPONSES TO PHQ QUESTIONS 1-9: 0
1. LITTLE INTEREST OR PLEASURE IN DOING THINGS: NOT AT ALL
SUM OF ALL RESPONSES TO PHQ QUESTIONS 1-9: 0
SUM OF ALL RESPONSES TO PHQ9 QUESTIONS 1 & 2: 0
SUM OF ALL RESPONSES TO PHQ QUESTIONS 1-9: 0
2. FEELING DOWN, DEPRESSED OR HOPELESS: NOT AT ALL
SUM OF ALL RESPONSES TO PHQ QUESTIONS 1-9: 0

## 2024-10-31 NOTE — PROGRESS NOTES
obtained    [x] Patient/Family Educated On:  [x] HF zones (Green, Yellow, Red) and aware of when to take action   [x] Daily weights  [] Scale provided   [x] Low sodium diet (2000 mg)  Barriers to compliance  [] Refuses to monitor diet  [] Socioeconomic difficulties  [] Unable to cook for self (use of frozen meals, can goods, etc)  [] CHF CHW consulted  [] Low sodium meal delivery options given to patient  [] Dietician consulted   [] Low sodium recipes provided  [] Sodium free seasoning provided   [x] Fluid intake 6-8 cups (around 64 oz)  [x] Reviewed currently prescribed medications with patient, educated on importance of compliance and answered any questions regarding their medication  [] Pill box provided to patient  [] Patient using pill packing pharmacy   [] CPAP/BiPAP use  [] Low impact exercise / cardiac rehab   [] LifeVest use  [x] Patient aware of signs and symptoms of worsening HF, CHF clinic phone number provided and made aware to call clinic for sooner if evaluation if needed     [] Difficulty affording medications  [] CHF CHW consulted  [] Prescription assistance information given   [] [facility] medication assistance program information given   [] Sample medications provided to patient to help bridge gap until affordability N/A    [x] PHQ assessment completed while in CHF clinic (1st visit, every 3 months and PRN)      10/31/2024     8:20 AM 4/25/2024     7:15 AM   PHQ Scores   PHQ2 Score 0 0   PHQ9 Score 0 0     Interpretation of Total Score Depression Severity:   1-4 = Minimal depression  5-9 = Mild depression  10-14 = Moderate depression  15-19 = Moderately severe depression  20-27 = Severe depression   [] Patient provided community resources for counseling services  [] PCP called and PHQ result faxed   [] Behavorial health consultant called        Scheduled to follow up in CHF clinic on:   Future Appointments   Date Time Provider Department Center   1/30/2025 10:00 AM [facility] CHF ROOM 2

## 2024-10-31 NOTE — PLAN OF CARE
Problem: Chronic Conditions and Co-morbidities  Goal: Patient's chronic conditions and co-morbidity symptoms are monitored and maintained or improved  Flowsheets (Taken 10/31/2024 2726)  Care Plan - Patient's Chronic Conditions and Co-Morbidity Symptoms are Monitored and Maintained or Improved: Monitor and assess patient's chronic conditions and comorbid symptoms for stability, deterioration, or improvement

## 2024-11-06 DIAGNOSIS — Z79.899 MEDICATION DOSE INCREASED: ICD-10-CM

## 2024-11-06 DIAGNOSIS — I50.20 HFREF (HEART FAILURE WITH REDUCED EJECTION FRACTION) (HCC): ICD-10-CM

## 2024-11-06 RX ORDER — SACUBITRIL AND VALSARTAN 97; 103 MG/1; MG/1
TABLET, FILM COATED ORAL
Qty: 60 TABLET | Refills: 0 | Status: SHIPPED | OUTPATIENT
Start: 2024-11-06

## 2024-11-21 DIAGNOSIS — I50.20 HFREF (HEART FAILURE WITH REDUCED EJECTION FRACTION) (HCC): ICD-10-CM

## 2024-11-21 DIAGNOSIS — Z79.899 NEW MEDICATION ADDED: ICD-10-CM

## 2024-11-21 RX ORDER — DAPAGLIFLOZIN 10 MG/1
10 TABLET, FILM COATED ORAL DAILY
Qty: 90 TABLET | Refills: 1 | Status: SHIPPED | COMMUNITY
Start: 2024-11-21

## 2024-11-29 ENCOUNTER — HOSPITAL ENCOUNTER (EMERGENCY)
Age: 64
Discharge: HOME | End: 2024-11-29
Payer: COMMERCIAL

## 2024-11-29 ENCOUNTER — APPOINTMENT (OUTPATIENT)
Dept: CT IMAGING | Age: 64
End: 2024-11-29
Payer: COMMERCIAL

## 2024-11-29 VITALS
SYSTOLIC BLOOD PRESSURE: 149 MMHG | HEART RATE: 72 BPM | TEMPERATURE: 97.8 F | DIASTOLIC BLOOD PRESSURE: 85 MMHG | OXYGEN SATURATION: 99 % | RESPIRATION RATE: 16 BRPM

## 2024-11-29 DIAGNOSIS — K11.20 PAROTIDITIS: Primary | ICD-10-CM

## 2024-11-29 DIAGNOSIS — R22.0 SWELLING OF RIGHT SIDE OF FACE: ICD-10-CM

## 2024-11-29 DIAGNOSIS — R51.9 RIGHT SIDED FACIAL PAIN: ICD-10-CM

## 2024-11-29 LAB
ALBUMIN SERPL-MCNC: 4.1 G/DL (ref 3.5–5.2)
ALP SERPL-CCNC: 111 U/L (ref 35–104)
ALT SERPL-CCNC: 9 U/L (ref 0–32)
ANION GAP SERPL CALCULATED.3IONS-SCNC: 10 MMOL/L (ref 7–16)
AST SERPL-CCNC: 18 U/L (ref 0–31)
BASOPHILS # BLD: 0.03 K/UL (ref 0–0.2)
BASOPHILS NFR BLD: 1 % (ref 0–2)
BILIRUB SERPL-MCNC: 0.6 MG/DL (ref 0–1.2)
BUN SERPL-MCNC: 11 MG/DL (ref 6–23)
CALCIUM SERPL-MCNC: 9.2 MG/DL (ref 8.6–10.2)
CHLORIDE SERPL-SCNC: 104 MMOL/L (ref 98–107)
CO2 SERPL-SCNC: 26 MMOL/L (ref 22–29)
CREAT SERPL-MCNC: 1 MG/DL (ref 0.5–1)
EOSINOPHIL # BLD: 0.2 K/UL (ref 0.05–0.5)
EOSINOPHILS RELATIVE PERCENT: 4 % (ref 0–6)
ERYTHROCYTE [DISTWIDTH] IN BLOOD BY AUTOMATED COUNT: 12.2 % (ref 11.5–15)
GFR, ESTIMATED: 64 ML/MIN/1.73M2
GLUCOSE SERPL-MCNC: 93 MG/DL (ref 74–99)
HCT VFR BLD AUTO: 40 % (ref 34–48)
HGB BLD-MCNC: 12.9 G/DL (ref 11.5–15.5)
IMM GRANULOCYTES # BLD AUTO: <0.03 K/UL (ref 0–0.58)
IMM GRANULOCYTES NFR BLD: 0 % (ref 0–5)
LYMPHOCYTES NFR BLD: 1.5 K/UL (ref 1.5–4)
LYMPHOCYTES RELATIVE PERCENT: 32 % (ref 20–42)
MCH RBC QN AUTO: 30.1 PG (ref 26–35)
MCHC RBC AUTO-ENTMCNC: 32.3 G/DL (ref 32–34.5)
MCV RBC AUTO: 93.2 FL (ref 80–99.9)
MONOCYTES NFR BLD: 0.51 K/UL (ref 0.1–0.95)
MONOCYTES NFR BLD: 11 % (ref 2–12)
NEUTROPHILS NFR BLD: 53 % (ref 43–80)
NEUTS SEG NFR BLD: 2.49 K/UL (ref 1.8–7.3)
PLATELET # BLD AUTO: 198 K/UL (ref 130–450)
PMV BLD AUTO: 11.2 FL (ref 7–12)
POTASSIUM SERPL-SCNC: 4.4 MMOL/L (ref 3.5–5)
PROT SERPL-MCNC: 7.7 G/DL (ref 6.4–8.3)
RBC # BLD AUTO: 4.29 M/UL (ref 3.5–5.5)
SODIUM SERPL-SCNC: 140 MMOL/L (ref 132–146)
WBC OTHER # BLD: 4.7 K/UL (ref 4.5–11.5)

## 2024-11-29 PROCEDURE — 2580000003 HC RX 258: Performed by: RADIOLOGY

## 2024-11-29 PROCEDURE — 6360000004 HC RX CONTRAST MEDICATION: Performed by: RADIOLOGY

## 2024-11-29 PROCEDURE — 80053 COMPREHEN METABOLIC PANEL: CPT

## 2024-11-29 PROCEDURE — 6370000000 HC RX 637 (ALT 250 FOR IP): Performed by: PHYSICIAN ASSISTANT

## 2024-11-29 PROCEDURE — 99285 EMERGENCY DEPT VISIT HI MDM: CPT

## 2024-11-29 PROCEDURE — 70491 CT SOFT TISSUE NECK W/DYE: CPT

## 2024-11-29 PROCEDURE — 85025 COMPLETE CBC W/AUTO DIFF WBC: CPT

## 2024-11-29 RX ORDER — PREDNISONE 20 MG/1
60 TABLET ORAL ONCE
Status: COMPLETED | OUTPATIENT
Start: 2024-11-29 | End: 2024-11-29

## 2024-11-29 RX ORDER — SODIUM CHLORIDE 0.9 % (FLUSH) 0.9 %
10 SYRINGE (ML) INJECTION PRN
Status: COMPLETED | OUTPATIENT
Start: 2024-11-29 | End: 2024-11-29

## 2024-11-29 RX ORDER — CLINDAMYCIN HYDROCHLORIDE 150 MG/1
450 CAPSULE ORAL 3 TIMES DAILY
Qty: 63 CAPSULE | Refills: 0 | Status: SHIPPED | OUTPATIENT
Start: 2024-11-29 | End: 2024-12-06

## 2024-11-29 RX ORDER — IOPAMIDOL 755 MG/ML
90 INJECTION, SOLUTION INTRAVASCULAR
Status: COMPLETED | OUTPATIENT
Start: 2024-11-29 | End: 2024-11-29

## 2024-11-29 RX ORDER — PREDNISONE 20 MG/1
TABLET ORAL
Qty: 18 TABLET | Refills: 0 | Status: SHIPPED | OUTPATIENT
Start: 2024-11-29 | End: 2024-11-29

## 2024-11-29 RX ORDER — HYDROCODONE BITARTRATE AND ACETAMINOPHEN 5; 325 MG/1; MG/1
1 TABLET ORAL EVERY 6 HOURS PRN
Qty: 8 TABLET | Refills: 0 | Status: SHIPPED | OUTPATIENT
Start: 2024-11-29 | End: 2024-12-01

## 2024-11-29 RX ORDER — PREDNISONE 20 MG/1
TABLET ORAL
Qty: 18 TABLET | Refills: 0 | Status: SHIPPED | OUTPATIENT
Start: 2024-11-29 | End: 2024-12-09

## 2024-11-29 RX ORDER — CLINDAMYCIN HYDROCHLORIDE 150 MG/1
450 CAPSULE ORAL ONCE
Status: COMPLETED | OUTPATIENT
Start: 2024-11-29 | End: 2024-11-29

## 2024-11-29 RX ADMIN — CLINDAMYCIN HYDROCHLORIDE 450 MG: 150 CAPSULE ORAL at 19:59

## 2024-11-29 RX ADMIN — IOPAMIDOL 90 ML: 755 INJECTION, SOLUTION INTRAVENOUS at 19:14

## 2024-11-29 RX ADMIN — SODIUM CHLORIDE, PRESERVATIVE FREE 10 ML: 5 INJECTION INTRAVENOUS at 19:15

## 2024-11-29 RX ADMIN — PREDNISONE 60 MG: 20 TABLET ORAL at 20:00

## 2024-11-29 ASSESSMENT — LIFESTYLE VARIABLES
HOW OFTEN DO YOU HAVE A DRINK CONTAINING ALCOHOL: NEVER
HOW MANY STANDARD DRINKS CONTAINING ALCOHOL DO YOU HAVE ON A TYPICAL DAY: PATIENT DOES NOT DRINK

## 2024-11-29 NOTE — ED PROVIDER NOTES
Independent INDER Visit.      Department of Emergency Medicine   ED  Provider Note  Admit Date/RoomTime: 11/29/2024  7:42 PM  ED Room: PR1/PR1    CHIEF COMPLAINT:   Chief Complaint   Patient presents with    Facial Swelling     Patient c/o right sided facial and neck swelling since Wednesday.     ---------------------------------HISTORY OF PRESENT ILLNESS-----------------------------------     Mirela Monae is a 64 y.o. female presenting to the ED for right-sided facial and neck swelling that began 2 days ago.  Patient denies any difficulty with breathing, swallowing, or handling her secretions.  She has no ear pain, sore throat, or dental pain.  She denies any excessive salivation or dry mouth.  She has no chest pain, shortness breath, pain with breathing, abdominal pain, nausea, vomiting, rash, loss of appetite, or fever/chills.  Patient is alert and oriented x 3 and in no apparent distress at this exam.  She is nontoxic-appearing.  She denies any recent travel.  Patient denies any history of HIV or immunosuppression.  Patient is unsure if she is up-to-date on her MMR vaccine.    PCP: Ferny Nelson MD  Dentist: None    Review of Systems:   Pertinent positives and negatives are stated within HPI, all other systems reviewed and are negative.    --------------------------------------------- PAST HISTORY ---------------------------------------------    Past Medical History:  has a past medical history of Arthritis, CHF (congestive heart failure) (HCC), and Hypertension.    Past Surgical History:  has a past surgical history that includes Dilation and curettage of uterus; Tonsillectomy; Breast enhancement surgery; and hernia repair.    Social History:  reports that she has never smoked. She has never used smokeless tobacco. She reports current alcohol use. She reports that she does not use drugs.    Family History: family history includes Breast Cancer in her maternal cousin; Colon Cancer in her maternal grandmother;

## 2024-11-30 NOTE — DISCHARGE INSTRUCTIONS
Return to emergency department with any worsening pain or swelling, development of fever, or any difficulty with breathing or swallowing secretions    Call family doctor on Monday to make follow-up appointment  Consider checking MMR titer

## 2024-12-04 ENCOUNTER — TELEPHONE (OUTPATIENT)
Dept: OTHER | Age: 64
End: 2024-12-04

## 2024-12-04 DIAGNOSIS — Z59.86 PATIENT CANNOT AFFORD MEDICATIONS: ICD-10-CM

## 2024-12-04 DIAGNOSIS — Z13.9 ENCOUNTER FOR SCREENING INVOLVING SOCIAL DETERMINANTS OF HEALTH (SDOH): ICD-10-CM

## 2024-12-04 DIAGNOSIS — Z59.71 INSURANCE COVERAGE PROBLEMS: Primary | ICD-10-CM

## 2024-12-04 SDOH — ECONOMIC STABILITY - INCOME SECURITY: FINANCIAL INSECURITY: Z59.86

## 2024-12-04 NOTE — TELEPHONE ENCOUNTER
CHF CHW Initial Call  12/4/2024  3:27 PM    CHW received referral from Radha Hammond RN.  Patient need: help with her insurance.  Notes: CHW called patient to assist with  help with her insurance. Patient walked into the CHF clinic asking for help obtaining medications as she has lost or changed her insurance. I called pt and she did not answer. I left a voice mail and provided my contact information.   Patient in agreement with plan and further assistance.  [x] Agreed    [] Declined      CHW informed patient of the services and resources that can be provided to them. CHW instructed patient to call CHF CHW at [phone] for any future assistance.     Electronically signed by Theresa Boone on 12/4/2024 at 3:27 PM